# Patient Record
Sex: FEMALE | Race: WHITE | NOT HISPANIC OR LATINO | Employment: FULL TIME | URBAN - METROPOLITAN AREA
[De-identification: names, ages, dates, MRNs, and addresses within clinical notes are randomized per-mention and may not be internally consistent; named-entity substitution may affect disease eponyms.]

---

## 2017-10-09 ENCOUNTER — OFFICE VISIT (OUTPATIENT)
Dept: CARDIOLOGY | Facility: CLINIC | Age: 67
End: 2017-10-09

## 2017-10-09 ENCOUNTER — CONVERSION ENCOUNTER (OUTPATIENT)
Dept: MAMMOGRAPHY | Facility: HOSPITAL | Age: 67
End: 2017-10-09

## 2017-10-09 VITALS
HEIGHT: 66 IN | BODY MASS INDEX: 35.36 KG/M2 | HEART RATE: 78 BPM | DIASTOLIC BLOOD PRESSURE: 72 MMHG | WEIGHT: 220 LBS | SYSTOLIC BLOOD PRESSURE: 120 MMHG

## 2017-10-09 DIAGNOSIS — I47.1 SUPRAVENTRICULAR TACHYCARDIA (HCC): Primary | ICD-10-CM

## 2017-10-09 PROCEDURE — 99213 OFFICE O/P EST LOW 20 MIN: CPT | Performed by: INTERNAL MEDICINE

## 2017-10-09 PROCEDURE — 93000 ELECTROCARDIOGRAM COMPLETE: CPT | Performed by: INTERNAL MEDICINE

## 2017-10-09 RX ORDER — ASPIRIN 325 MG
325 TABLET, DELAYED RELEASE (ENTERIC COATED) ORAL DAILY
Status: ON HOLD | COMMUNITY
End: 2021-12-09 | Stop reason: DRUGHIGH

## 2017-10-09 NOTE — PROGRESS NOTES
Date of Office Visit: 10/09/17  Encounter Provider: Rosendo Enrique MD  Place of Service: [facility] CARDIOLOGY  Patient Name: Samina Marin  :1950      Chief Complaint   Patient presents with   • Medical Clearance   • DOT Physical     History of Present Illness  HPI Comments: Mrs Marin is a very pleasant 66-year-old female with history of supraventricular tachycardia in 2014 had ablation.  She now comes in for follow-up.  The patient denies chest pain, pressure and heaviness. No shortness of breath, othopnea or PND. No palpitations, near syncope or syncope. No stroke type symptoms like paralysis, paresthesia, abrupt vision loss and dysarthria. No bleeding like blood in the stool or dark stools.   He feels like she's doing very well.        Past Medical History:   Diagnosis Date   • Allergic rhinitis    • Chronic bronchitis    • Perianal abscess    • Sinus bradycardia          Past Surgical History:   Procedure Laterality Date   • CARDIAC ELECTROPHYSIOLOGY STUDY AND ABLATION     • HYSTERECTOMY           Current Outpatient Prescriptions on File Prior to Visit   Medication Sig Dispense Refill   • esomeprazole (NexIUM) 40 MG capsule Take  by mouth daily.     • FLUoxetine (PROzac) 20 MG capsule Take  by mouth daily.     • solifenacin (VESICARE) 10 MG tablet Take  by mouth.     • [DISCONTINUED] diltiazem CD (CARDIZEM CD) 180 MG 24 hr capsule Take  by mouth.     • [DISCONTINUED] DULoxetine (CYMBALTA) 30 MG capsule Take  by mouth.       No current facility-administered medications on file prior to visit.          Social History     Social History   • Marital status:      Spouse name: N/A   • Number of children: N/A   • Years of education: N/A     Occupational History   • Not on file.     Social History Main Topics   • Smoking status: Never Smoker   • Smokeless tobacco: Current User   • Alcohol use Yes      Comment: recent decrease   • Drug use: Not on file   • Sexual activity:  "Not on file     Other Topics Concern   • Not on file     Social History Narrative       History reviewed. No pertinent family history.      Review of Systems   Constitution: Negative for decreased appetite, diaphoresis, fever, weakness, malaise/fatigue, weight gain and weight loss.   HENT: Negative for congestion, hearing loss, nosebleeds and tinnitus.    Eyes: Negative for blurred vision, double vision, vision loss in left eye, vision loss in right eye and visual disturbance.   Cardiovascular:        As noted in HPI   Respiratory:        As noted HPI   Endocrine: Negative for cold intolerance and heat intolerance.   Hematologic/Lymphatic: Negative for bleeding problem. Does not bruise/bleed easily.   Skin: Negative for color change, flushing, itching and rash.   Musculoskeletal: Negative for arthritis, back pain, joint pain, joint swelling, muscle weakness and myalgias.   Gastrointestinal: Negative for bloating, abdominal pain, constipation, diarrhea, dysphagia, heartburn, hematemesis, hematochezia, melena, nausea and vomiting.   Genitourinary: Negative for bladder incontinence, dysuria, frequency, nocturia and urgency.   Neurological: Negative for dizziness, focal weakness, headaches, light-headedness, loss of balance, numbness, paresthesias and vertigo.   Psychiatric/Behavioral: Negative for depression, memory loss and substance abuse.       Procedures      ECG 12 Lead  Date/Time: 10/9/2017 11:46 AM  Performed by: FRANCO MUNGUIA  Authorized by: FRANCO MUNGUIA   Comparison: compared with previous ECG   Similar to previous ECG  Rhythm: sinus rhythm  Rate: normal  Conduction: LAFB  QRS axis: left                 Objective:    /72 (BP Location: Right arm)  Pulse 78  Ht 66\" (167.6 cm)  Wt 220 lb (99.8 kg)  BMI 35.51 kg/m2       Physical Exam  Physical Exam   Constitutional: She is oriented to person, place, and time. She appears well-developed and well-nourished. No distress.   HENT:   Head: " Normocephalic.   Eyes: Conjunctivae are normal. Pupils are equal, round, and reactive to light. No scleral icterus.   Neck: Normal carotid pulses, no hepatojugular reflux and no JVD present. Carotid bruit is not present. No tracheal deviation, no edema and no erythema present. No thyromegaly present.   Cardiovascular: Normal rate, regular rhythm, S1 normal, S2 normal, normal heart sounds and intact distal pulses.   No extrasystoles are present. PMI is not displaced.  Exam reveals no gallop, no distant heart sounds and no friction rub.    No murmur heard.  Pulses:       Carotid pulses are 2+ on the right side, and 2+ on the left side.       Radial pulses are 2+ on the right side, and 2+ on the left side.        Femoral pulses are 2+ on the right side, and 2+ on the left side.       Dorsalis pedis pulses are 2+ on the right side, and 2+ on the left side.        Posterior tibial pulses are 2+ on the right side, and 2+ on the left side.   Pulmonary/Chest: Effort normal and breath sounds normal. No respiratory distress. She has no decreased breath sounds. She has no wheezes. She has no rhonchi. She has no rales. She exhibits no tenderness.   Abdominal: Soft. Bowel sounds are normal. She exhibits no distension and no mass. There is no hepatosplenomegaly. There is no tenderness. There is no rebound and no guarding.   Musculoskeletal: She exhibits no edema, tenderness or deformity.   Neurological: She is alert and oriented to person, place, and time.   Skin: Skin is warm and dry. No rash noted. She is not diaphoretic. No cyanosis or erythema. No pallor. Nails show no clubbing.   Psychiatric: She has a normal mood and affect. Her speech is normal and behavior is normal. Judgment and thought content normal.           Assessment:   1.  66-year-old female with a history of supraventricular tachycardia status post ablation now stable no recurrence off medication she's to continue the same will see us skin in follow-up in a  year.  2.  Need for 's license.  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    There are number of past history items in Ms. Marin's chart that were inaccurate and obviously abstracted into the wrong chart I removed those.          Plan:

## 2018-03-05 ENCOUNTER — OFFICE VISIT CONVERTED (OUTPATIENT)
Dept: FAMILY MEDICINE CLINIC | Facility: CLINIC | Age: 68
End: 2018-03-05
Attending: NURSE PRACTITIONER

## 2018-03-05 ENCOUNTER — CONVERSION ENCOUNTER (OUTPATIENT)
Dept: FAMILY MEDICINE CLINIC | Facility: CLINIC | Age: 68
End: 2018-03-05

## 2018-10-15 ENCOUNTER — OFFICE VISIT (OUTPATIENT)
Dept: CARDIOLOGY | Facility: CLINIC | Age: 68
End: 2018-10-15

## 2018-10-15 VITALS
SYSTOLIC BLOOD PRESSURE: 130 MMHG | BODY MASS INDEX: 36.48 KG/M2 | DIASTOLIC BLOOD PRESSURE: 82 MMHG | HEART RATE: 84 BPM | HEIGHT: 66 IN | WEIGHT: 227 LBS

## 2018-10-15 DIAGNOSIS — I20.0 UNSTABLE ANGINA PECTORIS (HCC): ICD-10-CM

## 2018-10-15 DIAGNOSIS — I47.1 SUPRAVENTRICULAR TACHYCARDIA (HCC): Primary | ICD-10-CM

## 2018-10-15 PROCEDURE — 93000 ELECTROCARDIOGRAM COMPLETE: CPT | Performed by: INTERNAL MEDICINE

## 2018-10-15 PROCEDURE — 99214 OFFICE O/P EST MOD 30 MIN: CPT | Performed by: INTERNAL MEDICINE

## 2018-10-15 RX ORDER — DULOXETIN HYDROCHLORIDE 60 MG/1
60 CAPSULE, DELAYED RELEASE ORAL DAILY
COMMUNITY
End: 2021-08-26 | Stop reason: SDUPTHER

## 2018-10-15 NOTE — PROGRESS NOTES
Date of Office Visit: 10/15/18  Encounter Provider: Rosendo Enrique MD  Place of Service: [facility] CARDIOLOGY  Patient Name: Samina Marin  :1950  Referral Provider:No ref. provider found      Chief Complaint   Patient presents with   • Follow-up     History of Present Illness  Mrs Marin is a very pleasant 67-year-old female with history of supraventricular tachycardia in 2014 had ablation.  She now comes in for follow-up.  Unfortunately about 2 weeks ago she started noticing this epigastric discomfort that can occur at any time lasting anywhere from 15 minutes up to an hour one episode she had was associated with diaphoresis.  No associated nausea vomiting or shortness of breath but she does get shortness of breath with exertion which is chronic.  This discomfort is getting progressively worse over this two-week time period.  She it's occasional fluttering but no near-syncope or syncope.  There has been a fair amount of stressors recently her brother just had bypass grafting a month ago when her 2 sisters both have cancer that she's had to deal with that.  Things develop more stressful work even.          Past Medical History:   Diagnosis Date   • Allergic rhinitis    • Chronic bronchitis (CMS/HCC)    • Perianal abscess    • Sinus bradycardia          Past Surgical History:   Procedure Laterality Date   • CARDIAC ELECTROPHYSIOLOGY STUDY AND ABLATION     • HYSTERECTOMY           Current Outpatient Prescriptions on File Prior to Visit   Medication Sig Dispense Refill   • aspirin  MG tablet Take 325 mg by mouth Daily.     • esomeprazole (NexIUM) 40 MG capsule Take  by mouth daily.     • FLUoxetine (PROzac) 20 MG capsule Take  by mouth daily.     • Multiple Vitamin (MULTI VITAMIN PO) Take  by mouth Daily.     • solifenacin (VESICARE) 10 MG tablet Take  by mouth.       No current facility-administered medications on file prior to visit.          Social History     Social  History   • Marital status:      Spouse name: N/A   • Number of children: N/A   • Years of education: N/A     Occupational History   • Not on file.     Social History Main Topics   • Smoking status: Never Smoker   • Smokeless tobacco: Current User      Comment: CAFFEINE USE   • Alcohol use Yes      Comment: RARE   • Drug use: Unknown   • Sexual activity: Not on file     Other Topics Concern   • Not on file     Social History Narrative   • No narrative on file       Family History   Problem Relation Age of Onset   • Heart disease Sister    • Ovarian cancer Sister    • Liver cancer Sister    • Heart disease Brother    • Hypertension Sister    • Pancreatic cancer Sister          Review of Systems   Constitution: Negative for decreased appetite, diaphoresis, fever, weakness, malaise/fatigue, weight gain and weight loss.   HENT: Negative for congestion, hearing loss, nosebleeds and tinnitus.    Eyes: Negative for blurred vision, double vision, vision loss in left eye, vision loss in right eye and visual disturbance.   Cardiovascular:        As noted in HPI   Respiratory:        As noted HPI   Endocrine: Negative for cold intolerance and heat intolerance.   Hematologic/Lymphatic: Negative for bleeding problem. Does not bruise/bleed easily.   Skin: Negative for color change, flushing, itching and rash.   Musculoskeletal: Negative for arthritis, back pain, joint pain, joint swelling, muscle weakness and myalgias.   Gastrointestinal: Negative for bloating, abdominal pain, constipation, diarrhea, dysphagia, heartburn, hematemesis, hematochezia, melena, nausea and vomiting.   Genitourinary: Negative for bladder incontinence, dysuria, frequency, nocturia and urgency.   Neurological: Negative for dizziness, focal weakness, headaches, light-headedness, loss of balance, numbness, paresthesias and vertigo.   Psychiatric/Behavioral: Negative for depression, memory loss and substance abuse.       Procedures      ECG 12  "Lead  Date/Time: 10/15/2018 2:17 PM  Performed by: FRANCO MUNGUIA  Authorized by: FRANCO MUNGUIA   Comparison: compared with previous ECG   Similar to previous ECG  Rhythm: sinus rhythm  Rate: normal  QRS axis: normal  Other findings: PRWP                Objective:    /82   Pulse 84   Ht 167.6 cm (66\")   Wt 103 kg (227 lb)   BMI 36.64 kg/m²        Physical Exam  Physical Exam   Constitutional: She is oriented to person, place, and time. She appears well-developed and well-nourished. No distress.   HENT:   Head: Normocephalic.   Eyes: Pupils are equal, round, and reactive to light. Conjunctivae are normal. No scleral icterus.   Neck: Normal carotid pulses, no hepatojugular reflux and no JVD present. Carotid bruit is not present. No tracheal deviation, no edema and no erythema present. No thyromegaly present.   Cardiovascular: Normal rate, regular rhythm, S1 normal, S2 normal, normal heart sounds and intact distal pulses.   No extrasystoles are present. PMI is not displaced.  Exam reveals no gallop, no distant heart sounds and no friction rub.    No murmur heard.  Pulses:       Carotid pulses are 2+ on the right side, and 2+ on the left side.       Radial pulses are 2+ on the right side, and 2+ on the left side.        Femoral pulses are 2+ on the right side, and 2+ on the left side.       Dorsalis pedis pulses are 2+ on the right side, and 2+ on the left side.        Posterior tibial pulses are 2+ on the right side, and 2+ on the left side.   Pulmonary/Chest: Effort normal and breath sounds normal. No respiratory distress. She has no decreased breath sounds. She has no wheezes. She has no rhonchi. She has no rales. She exhibits no tenderness.   Abdominal: Soft. Bowel sounds are normal. She exhibits no distension and no mass. There is no hepatosplenomegaly. There is no tenderness. There is no rebound and no guarding.   Musculoskeletal: She exhibits no edema, tenderness or deformity.   Neurological: She " is alert and oriented to person, place, and time.   Skin: Skin is warm and dry. No rash noted. She is not diaphoretic. No cyanosis or erythema. No pallor. Nails show no clubbing.   Psychiatric: She has a normal mood and affect. Her speech is normal and behavior is normal. Judgment and thought content normal.           Assessment:   1.  67-year-old female with a history of supraventricular tachycardia status post ablation now stable no recurrence off medication she's to continue the same will see us again in follow-up in a year.  2.  Chest pain.  Worrisome for angina.  Her coronary disease risk consortium score is intermediate at 6%.  Therefore warrants perfusion stress testing will do this as a one-day non-walking protocol.  She is agreeable to this approach.  3.  Questionable hyperlipidemia does need lipid profile especially in face of her brothers heart history now.  When she's had that done by her PCP would calculate [employer] College of cardiology risk of stroke or heart attack in 10 years and treat her accordingly.  4.  Probable depression may need further work on this.         Plan:

## 2018-10-22 ENCOUNTER — OFFICE VISIT CONVERTED (OUTPATIENT)
Dept: FAMILY MEDICINE CLINIC | Facility: CLINIC | Age: 68
End: 2018-10-22
Attending: NURSE PRACTITIONER

## 2018-10-23 ENCOUNTER — TELEPHONE (OUTPATIENT)
Dept: CARDIOLOGY | Facility: CLINIC | Age: 68
End: 2018-10-23

## 2018-10-29 ENCOUNTER — TELEPHONE (OUTPATIENT)
Dept: CARDIOLOGY | Facility: CLINIC | Age: 68
End: 2018-10-29

## 2018-10-29 ENCOUNTER — HOSPITAL ENCOUNTER (OUTPATIENT)
Dept: CARDIOLOGY | Facility: HOSPITAL | Age: 68
Discharge: HOME | End: 2018-10-29
Attending: INTERNAL MEDICINE | Admitting: INTERNAL MEDICINE

## 2018-10-29 VITALS — WEIGHT: 227 LBS | BODY MASS INDEX: 36.48 KG/M2 | HEIGHT: 66 IN

## 2018-10-29 DIAGNOSIS — I25.2 OLD MI (MYOCARDIAL INFARCTION): Primary | ICD-10-CM

## 2018-10-29 LAB
BH CV NUCLEAR PRIOR STUDY: 2
BH CV STRESS BP STAGE 1: NORMAL
BH CV STRESS COMMENTS STAGE 1: NORMAL
BH CV STRESS DOSE REGADENOSON STAGE 1: 0.4
BH CV STRESS DURATION MIN STAGE 1: 0
BH CV STRESS DURATION SEC STAGE 1: 10
BH CV STRESS HR STAGE 1: 123
BH CV STRESS PROTOCOL 1: NORMAL
BH CV STRESS RECOVERY BP: NORMAL MMHG
BH CV STRESS RECOVERY HR: 98 BPM
BH CV STRESS STAGE 1: 1
LV EF NUC BP: 53 %
MAXIMAL PREDICTED HEART RATE: 153 BPM
PERCENT MAX PREDICTED HR: 80.39 %
STRESS BASELINE BP: NORMAL MMHG
STRESS BASELINE HR: 86 BPM
STRESS PERCENT HR: 95 %
STRESS POST EXERCISE DUR SEC: 10 SEC
STRESS POST PEAK BP: NORMAL MMHG
STRESS POST PEAK HR: 123 BPM
STRESS TARGET HR: 130 BPM

## 2018-10-29 PROCEDURE — 93017 CV STRESS TEST TRACING ONLY: CPT

## 2018-10-29 PROCEDURE — 93016 CV STRESS TEST SUPVJ ONLY: CPT | Performed by: INTERNAL MEDICINE

## 2018-10-29 PROCEDURE — 78452 HT MUSCLE IMAGE SPECT MULT: CPT | Performed by: INTERNAL MEDICINE

## 2018-10-29 PROCEDURE — 78452 HT MUSCLE IMAGE SPECT MULT: CPT

## 2018-10-29 PROCEDURE — A9502 TC99M TETROFOSMIN: HCPCS | Performed by: INTERNAL MEDICINE

## 2018-10-29 PROCEDURE — 0 TECHNETIUM TETROFOSMIN KIT: Performed by: INTERNAL MEDICINE

## 2018-10-29 PROCEDURE — 93018 CV STRESS TEST I&R ONLY: CPT | Performed by: INTERNAL MEDICINE

## 2018-10-29 PROCEDURE — 25010000002 REGADENOSON 0.4 MG/5ML SOLUTION: Performed by: INTERNAL MEDICINE

## 2018-10-29 RX ORDER — AMLODIPINE BESYLATE 2.5 MG/1
2.5 TABLET ORAL DAILY
Qty: 30 TABLET | Refills: 11 | Status: SHIPPED | OUTPATIENT
Start: 2018-10-29 | End: 2018-10-30

## 2018-10-29 RX ADMIN — TETROFOSMIN 1 DOSE: 1.38 INJECTION, POWDER, LYOPHILIZED, FOR SOLUTION INTRAVENOUS at 09:45

## 2018-10-29 RX ADMIN — REGADENOSON 0.4 MG: 0.08 INJECTION, SOLUTION INTRAVENOUS at 09:45

## 2018-10-29 RX ADMIN — TETROFOSMIN 1 DOSE: 1.38 INJECTION, POWDER, LYOPHILIZED, FOR SOLUTION INTRAVENOUS at 08:40

## 2018-10-29 NOTE — TELEPHONE ENCOUNTER
Called l/m to call back. Needs an echo and amlodipine. Will need to see Mariah in 4 weeks, me 6 months.BORIS

## 2018-10-29 NOTE — TELEPHONE ENCOUNTER
Samina Marin  Female, 67 y.o., 1950  PCP:   Jonelle Joseph APRN  Language:   English  Need Interp:   No  Last Weight:   103 kg (227 lb)  Phone:   H: [phone] W: [phone]  Allergies  Meperidine  Health Maintenance:   Due  FYI:   None  Primary Ins.:   [employer] MEDICARE REPLACEMENT  MRN:   [id number]  MyChart:   Pending  Pharmacy:   [facility] [address] [place] [address] [address] AT [facility] [address] - [phone] PH - [phone] FX [[id number]] [facility] - [address] - [phone] [facility] [phone] FX [[id number]]  Preferred Lab:   None  Next Appt with Me:   None  Next Appt Date by Dept:   None        PACS Images     Radiology Images   Stress Test With Myocardial Perfusion One Day   Order: [id number]   Status:  Final result   Visible to patient:  No (Not Released) Dx:  Unstable angina pectoris (CMS/HCC)   Details     Reading Physician Reading Date Result Priority   Fabian Gonzales MD Haraden, Brennan M, MD Haraden, Brennan M, MD 10/29/2018  10/29/2018  10/29/2018 Routine   Result Text   ·   Myocardial perfusion imaging indicates a small-to-medium-sized infarct located in the inferior wall and apex with no significant ischemia noted.  · Left ventricular ejection fraction is normal (Calculated EF = 53%).            All Measurements                                                                                                                          [facility] NUC [facility]ARD  [address] [address] [phone]      Stress Data     Stage Heart Rate (BPM) Blood Pressure (mmHg) Minutes Seconds Dose (mg) Comments   1        123        150/84        0        10        0.4        10 sec bolus injection          Stress Measurements     Baseline Vitals   Baseline HR 86 bpm      Baseline /98 mmHg       Peak Stress Vitals   Peak  bpm      Peak /84 mmHg       Recovery Vitals    Recovery HR 98 bpm      Recovery /86 mmHg       Exercise Data   Target HR (85%) 130 bpm      Max. Pred. HR (100%) 153 bpm      Percent Max Pred HR 80.39 %      Exercise duration (sec) 10 sec         Stress Procedure     Rest ECG Baseline ECG of normal sinus rhythm noted. Non-specific ST-T wave changes noted.   VT interval = 160 ms. QRS complex = 80 ms. There was no ST segment deviation noted.      Stress ECG Stress ECG of normal sinus rhythm noted. Normal ECG with no significant stress induced changes noted.   There was no ST segment deviation noted during stress.   Arrhythmias during stress: rare PVCs.   There were no arrhythmias during recovery.   Arrhythmias were not significant.   ECG was interpretable.   Indeterminate stress ECG interpretation.      Stress Description A pharmacological stress test was performed using regadenoson with low-level exercise.   The patient reached the end of the protocol.   The patient reported dyspnea during the stress test. Symptoms were not clinically significant.      Stress Findings Equivocal ECG evidence of myocardial ischemia.Indeterminate clinical evidence of myocardial ischemia.      Nuclear Perfusion Findings     Study Impression Myocardial perfusion imaging indicates a small-to-medium-sized infarct located in the inferior wall and apex with no significant ischemia noted.      Rest Perfusion Defect 1 There is a moderately sized defect with moderate reduction in uptake present in the mid inferior and apical wall.      Stress Perfusion Defect 1 There is a moderately sized defect of mild severity present in the mid inferior and apical inferior wall.      Ventricle Size / Description Left ventricular ejection fraction is normal (Calculated EF = 53%). Normal LV cavity size. Abnormal LV wall motion consistent with mild hypokinesis of the inferior wall. RV cavity size not well visualized.      PACS Images     Show images for Stress Test With Myocardial Perfusion One Day           Last Resulted: 10/29/18 12:06                Status of Other Orders     Order  Lab Status Result Date Provider Status   ECG 12 Lead Final result 10/15/2018 Open   SCANNED EKG Final result 10/15/2018 Open          Encounter Notes      All notes         Routing History     Priority Sent On From To Message Type    10/29/2018 12:10 PM Fabian Gonzales MD Imburgia, Michael, MD Results

## 2018-10-30 ENCOUNTER — TELEPHONE (OUTPATIENT)
Dept: CARDIOLOGY | Facility: CLINIC | Age: 68
End: 2018-10-30

## 2018-11-05 ENCOUNTER — OFFICE VISIT CONVERTED (OUTPATIENT)
Dept: FAMILY MEDICINE CLINIC | Facility: CLINIC | Age: 68
End: 2018-11-05
Attending: NURSE PRACTITIONER

## 2018-11-06 ENCOUNTER — TELEPHONE (OUTPATIENT)
Dept: CARDIOLOGY | Facility: CLINIC | Age: 68
End: 2018-11-06

## 2018-11-06 ENCOUNTER — HOSPITAL ENCOUNTER (OUTPATIENT)
Dept: CARDIOLOGY | Facility: HOSPITAL | Age: 68
Discharge: HOME | End: 2018-11-06
Attending: INTERNAL MEDICINE | Admitting: INTERNAL MEDICINE

## 2018-11-06 VITALS
WEIGHT: 227 LBS | DIASTOLIC BLOOD PRESSURE: 98 MMHG | SYSTOLIC BLOOD PRESSURE: 150 MMHG | HEIGHT: 66 IN | HEART RATE: 80 BPM | BODY MASS INDEX: 36.48 KG/M2

## 2018-11-06 PROCEDURE — 93306 TTE W/DOPPLER COMPLETE: CPT

## 2018-11-06 PROCEDURE — 93306 TTE W/DOPPLER COMPLETE: CPT | Performed by: INTERNAL MEDICINE

## 2018-11-06 PROCEDURE — 25010000002 PERFLUTREN (DEFINITY) 8.476 MG IN SODIUM CHLORIDE 0.9 % 10 ML INJECTION: Performed by: INTERNAL MEDICINE

## 2018-11-06 RX ADMIN — PERFLUTREN 1.5 ML: 6.52 INJECTION, SUSPENSION INTRAVENOUS at 15:35

## 2018-11-06 NOTE — TELEPHONE ENCOUNTER
Tomas Samina J  Female, 67 y.o., 1950  PCP:   Jonelle Joseph APRN  Language:   English  Need Interp:   No  Last Weight:   103 kg (227 lb)  Phone:   H: [phone] W: [phone]  Allergies  Meperidine  Health Maintenance:   Due  FYI:   None  Primary Ins.:   HUMANA MEDICARE REPLACEMENT  MRN:   [id number]  MyChart:   Pending  Pharmacy:   [facility] [address] [place] [address] [address] AT [facility] [address] - [phone] PH - [phone] FX [[id number]] [facility] - [address] - [phone] [facility] [phone] FX [[id number]]  Preferred Lab:   None  Next Appt with Me:   None  Next Appt Date by Dept:   None        PACS Images     Radiology Images   Adult Transthoracic Echo Complete W/ Cont if Necessary Per Protocol   Order: [id number]   Status:  Final result   Visible to patient:  No (Not Released) Dx:  Old MI (myocardial infarction)   Details     Reading Physician Reading Date Result Priority   Rosendo Enrique MD 11/6/2018 Routine   Result Text   ·   Left ventricular systolic function is normal. Calculated EF = 60%. Estimated EF was in agreement with the calculated EF. Normal left ventricular cavity size noted. All left ventricular wall segments contract normally.  · Left ventricular wall thickness is consistent with borderline concentric hypertrophy.  · Left ventricular diastolic dysfunction is noted (grade II w/high LAP) consistent with pseudonormalization.  · Mild aortic valve regurgitation is present.            All Measurements                                                                                                                                                                                                                                                                                                                                                                                                                                                                                                                                                                                                                                                         [facility] OUTPATIENT ECHOCARDIOGRAPHY  [address] [facility]  [address]  [facility] [address] [phone]      Study Description     A two-dimensional transthoracic echocardiogram with color flow and Doppler was performed. The study is technically good for diagnosis.Verbal consent was obtained from the patient to use Definity contrast in order to optimize the study. The use of Definity was indicated as two or more contiguous segments of the left ventricular endocardial border were unable to be adequately visualized by standard imaging methods. 1.3 mL of mechanically activated Definity was mixed with 8.7 mL of normal saline. A total of 1.5 mL of the resulting Definity solution was administered, and the remaining contrast was wasted and discarded.   No adverse reaction to contrast was noted.   O2-96%.   Echocardiogram Findings     Left Ventricle Left ventricular systolic function is normal. Calculated EF = 60%. Estimated EF was in agreement with the calculated EF. Normal left ventricular cavity size noted. All left ventricular wall segments contract normally. Left ventricular wall thickness is consistent with borderline concentric hypertrophy. Left ventricular diastolic dysfunction is noted (grade II w/high LAP) consistent with pseudonormalization.      Right Ventricle Normal right ventricular cavity size, wall thickness, systolic function and septal motion noted.      Left Atrium Normal left atrial size and volume noted.      Right Atrium Normal right atrial size noted.      Aortic Valve The aortic valve is structurally normal. Mild aortic valve regurgitation is present. No aortic valve stenosis is present.      Mitral Valve The mitral valve is normal in structure. No mitral valve regurgitation is  present. No significant mitral valve stenosis is present.      Tricuspid Valve The tricuspid valve is normal. No tricuspid valve stenosis is present. No tricuspid valve regurgitation is present. Calculated right ventricular systolic pressure from tricuspid regurgitation is 15.4 mmHg.      Pulmonic Valve The pulmonic valve is structurally normal. There is no significant pulmonic valve stenosis present. There is mild pulmonic valve regurgitation present.      Greater Vessels No dilation of the aortic root is present. No dilation of the sinuses of Valsalva is present. No dilation of the proximal aorta is present. No dilation of the ascending aorta is present. No dilation of the aortic arch is present. No dilation of the descending aorta present. The inferior vena cava is normally sized. Normal IVC inspiratory collapse of greater than 50% noted.      Pericardium The pericardium is normal. There is no evidence of pericardial effusion.      Wall Scoring     Score Index: 1.000 Percent Normal: 100.0%             The left ventricular wall motion is normal.               PACS Images     Show images for Adult Transthoracic Echo Complete W/ Cont if Necessary Per Protocol         Specimen Collected: 11/06/18 15:00 Last Resulted: 11/06/18 15:51                Encounter Notes      All notes         Routing History     Priority Sent On From To Message Type    11/6/2018  3:55 PM Rosendo Enrique MD Imburgia, Michael, MD Results

## 2018-11-07 LAB
ASCENDING AORTA: 3.3 CM
BH CV ECHO MEAS - ACS: 2.1 CM
BH CV ECHO MEAS - AI DEC SLOPE: 249.9 CM/SEC^2
BH CV ECHO MEAS - AI MAX PG: 73.2 MMHG
BH CV ECHO MEAS - AI MAX VEL: 427.7 CM/SEC
BH CV ECHO MEAS - AI P1/2T: 501.2 MSEC
BH CV ECHO MEAS - AO MAX PG (FULL): 2.4 MMHG
BH CV ECHO MEAS - AO MAX PG: 4.3 MMHG
BH CV ECHO MEAS - AO MEAN PG (FULL): 1.4 MMHG
BH CV ECHO MEAS - AO MEAN PG: 2.6 MMHG
BH CV ECHO MEAS - AO ROOT AREA (BSA CORRECTED): 1.7
BH CV ECHO MEAS - AO ROOT AREA: 10.1 CM^2
BH CV ECHO MEAS - AO ROOT DIAM: 3.6 CM
BH CV ECHO MEAS - AO V2 MAX: 103.2 CM/SEC
BH CV ECHO MEAS - AO V2 MEAN: 78.6 CM/SEC
BH CV ECHO MEAS - AO V2 VTI: 22.4 CM
BH CV ECHO MEAS - AVA(I,A): 2.3 CM^2
BH CV ECHO MEAS - AVA(I,D): 2.3 CM^2
BH CV ECHO MEAS - AVA(V,A): 2.2 CM^2
BH CV ECHO MEAS - AVA(V,D): 2.2 CM^2
BH CV ECHO MEAS - BSA(HAYCOCK): 2.2 M^2
BH CV ECHO MEAS - BSA: 2.1 M^2
BH CV ECHO MEAS - BZI_BMI: 36.6 KILOGRAMS/M^2
BH CV ECHO MEAS - BZI_METRIC_HEIGHT: 167.6 CM
BH CV ECHO MEAS - BZI_METRIC_WEIGHT: 103 KG
BH CV ECHO MEAS - EDV(MOD-SP2): 103 ML
BH CV ECHO MEAS - EDV(MOD-SP4): 88 ML
BH CV ECHO MEAS - EDV(TEICH): 107.6 ML
BH CV ECHO MEAS - EF(CUBED): 72.2 %
BH CV ECHO MEAS - EF(MOD-BP): 60 %
BH CV ECHO MEAS - EF(MOD-SP2): 56.3 %
BH CV ECHO MEAS - EF(MOD-SP4): 61.4 %
BH CV ECHO MEAS - EF(TEICH): 63.8 %
BH CV ECHO MEAS - ESV(MOD-SP2): 45 ML
BH CV ECHO MEAS - ESV(MOD-SP4): 34 ML
BH CV ECHO MEAS - ESV(TEICH): 38.9 ML
BH CV ECHO MEAS - FS: 34.7 %
BH CV ECHO MEAS - IVS/LVPW: 1.1
BH CV ECHO MEAS - IVSD: 1.2 CM
BH CV ECHO MEAS - LAT PEAK E' VEL: 7 CM/SEC
BH CV ECHO MEAS - LV DIASTOLIC VOL/BSA (35-75): 41.7 ML/M^2
BH CV ECHO MEAS - LV MASS(C)D: 214 GRAMS
BH CV ECHO MEAS - LV MASS(C)DI: 101.4 GRAMS/M^2
BH CV ECHO MEAS - LV MAX PG: 1.8 MMHG
BH CV ECHO MEAS - LV MEAN PG: 1.2 MMHG
BH CV ECHO MEAS - LV SYSTOLIC VOL/BSA (12-30): 16.1 ML/M^2
BH CV ECHO MEAS - LV V1 MAX: 67.4 CM/SEC
BH CV ECHO MEAS - LV V1 MEAN: 53.7 CM/SEC
BH CV ECHO MEAS - LV V1 VTI: 15.1 CM
BH CV ECHO MEAS - LVIDD: 4.8 CM
BH CV ECHO MEAS - LVIDS: 3.1 CM
BH CV ECHO MEAS - LVLD AP2: 7.5 CM
BH CV ECHO MEAS - LVLD AP4: 7.1 CM
BH CV ECHO MEAS - LVLS AP2: 6.1 CM
BH CV ECHO MEAS - LVLS AP4: 6.2 CM
BH CV ECHO MEAS - LVOT AREA (M): 3.5 CM^2
BH CV ECHO MEAS - LVOT AREA: 3.3 CM^2
BH CV ECHO MEAS - LVOT DIAM: 2.1 CM
BH CV ECHO MEAS - LVPWD: 1.1 CM
BH CV ECHO MEAS - MED PEAK E' VEL: 5 CM/SEC
BH CV ECHO MEAS - MR MAX PG: 21.8 MMHG
BH CV ECHO MEAS - MR MAX VEL: 233.5 CM/SEC
BH CV ECHO MEAS - MV A DUR: 0.1 SEC
BH CV ECHO MEAS - MV A MAX VEL: 58.7 CM/SEC
BH CV ECHO MEAS - MV DEC SLOPE: 490.6 CM/SEC^2
BH CV ECHO MEAS - MV DEC TIME: 0.14 SEC
BH CV ECHO MEAS - MV E MAX VEL: 69.8 CM/SEC
BH CV ECHO MEAS - MV E/A: 1.2
BH CV ECHO MEAS - MV MAX PG: 2.1 MMHG
BH CV ECHO MEAS - MV MEAN PG: 0.91 MMHG
BH CV ECHO MEAS - MV P1/2T MAX VEL: 70.3 CM/SEC
BH CV ECHO MEAS - MV P1/2T: 42 MSEC
BH CV ECHO MEAS - MV V2 MAX: 71.9 CM/SEC
BH CV ECHO MEAS - MV V2 MEAN: 45.9 CM/SEC
BH CV ECHO MEAS - MV V2 VTI: 19.7 CM
BH CV ECHO MEAS - MVA P1/2T LCG: 3.1 CM^2
BH CV ECHO MEAS - MVA(P1/2T): 5.2 CM^2
BH CV ECHO MEAS - MVA(VTI): 2.6 CM^2
BH CV ECHO MEAS - PA ACC TIME: 0.08 SEC
BH CV ECHO MEAS - PA MAX PG (FULL): 1.3 MMHG
BH CV ECHO MEAS - PA MAX PG: 2.5 MMHG
BH CV ECHO MEAS - PA PR(ACCEL): 41 MMHG
BH CV ECHO MEAS - PA V2 MAX: 78.7 CM/SEC
BH CV ECHO MEAS - PULM A REVS DUR: 0.12 SEC
BH CV ECHO MEAS - PULM A REVS VEL: 17.9 CM/SEC
BH CV ECHO MEAS - PULM DIAS VEL: 57.2 CM/SEC
BH CV ECHO MEAS - PULM S/D: 0.73
BH CV ECHO MEAS - PULM SYS VEL: 41.7 CM/SEC
BH CV ECHO MEAS - PVA(V,A): 2.1 CM^2
BH CV ECHO MEAS - PVA(V,D): 2.1 CM^2
BH CV ECHO MEAS - QP/QS: 0.72
BH CV ECHO MEAS - RAP SYSTOLE: 3 MMHG
BH CV ECHO MEAS - RV MAX PG: 1.2 MMHG
BH CV ECHO MEAS - RV MEAN PG: 0.67 MMHG
BH CV ECHO MEAS - RV V1 MAX: 55.3 CM/SEC
BH CV ECHO MEAS - RV V1 MEAN: 39.1 CM/SEC
BH CV ECHO MEAS - RV V1 VTI: 12.4 CM
BH CV ECHO MEAS - RVOT AREA: 2.9 CM^2
BH CV ECHO MEAS - RVOT DIAM: 1.9 CM
BH CV ECHO MEAS - RVSP: 15.4 MMHG
BH CV ECHO MEAS - SI(AO): 106.9 ML/M^2
BH CV ECHO MEAS - SI(CUBED): 37.9 ML/M^2
BH CV ECHO MEAS - SI(LVOT): 23.9 ML/M^2
BH CV ECHO MEAS - SI(MOD-SP2): 27.5 ML/M^2
BH CV ECHO MEAS - SI(MOD-SP4): 25.6 ML/M^2
BH CV ECHO MEAS - SI(TEICH): 32.5 ML/M^2
BH CV ECHO MEAS - SUP REN AO DIAM: 1.9 CM
BH CV ECHO MEAS - SV(AO): 225.7 ML
BH CV ECHO MEAS - SV(CUBED): 79.9 ML
BH CV ECHO MEAS - SV(LVOT): 50.5 ML
BH CV ECHO MEAS - SV(MOD-SP2): 58 ML
BH CV ECHO MEAS - SV(MOD-SP4): 54 ML
BH CV ECHO MEAS - SV(RVOT): 36.2 ML
BH CV ECHO MEAS - SV(TEICH): 68.7 ML
BH CV ECHO MEAS - TAPSE (>1.6): 2.7 CM2
BH CV ECHO MEAS - TR MAX VEL: 175.8 CM/SEC
BH CV ECHO MEASUREMENTS AVERAGE E/E' RATIO: 11.63
BH CV XLRA - RV BASE: 3 CM
BH CV XLRA - TDI S': 13 CM/SEC
LEFT ATRIUM VOLUME INDEX: 21 ML/M2
MAXIMAL PREDICTED HEART RATE: 153 BPM
SINUS: 3.1 CM
STJ: 2.3 CM
STRESS TARGET HR: 130 BPM

## 2019-04-22 ENCOUNTER — OFFICE VISIT CONVERTED (OUTPATIENT)
Dept: FAMILY MEDICINE CLINIC | Facility: CLINIC | Age: 69
End: 2019-04-22
Attending: NURSE PRACTITIONER

## 2019-07-11 ENCOUNTER — HOSPITAL ENCOUNTER (OUTPATIENT)
Dept: GENERAL RADIOLOGY | Facility: HOSPITAL | Age: 69
Discharge: HOME | End: 2019-07-11
Attending: NURSE PRACTITIONER

## 2020-03-10 ENCOUNTER — CONVERSION ENCOUNTER (OUTPATIENT)
Dept: FAMILY MEDICINE CLINIC | Facility: CLINIC | Age: 70
End: 2020-03-10

## 2020-03-10 ENCOUNTER — HOSPITAL ENCOUNTER (OUTPATIENT)
Dept: LAB | Facility: HOSPITAL | Age: 70
Discharge: HOME | End: 2020-03-10
Attending: NURSE PRACTITIONER

## 2020-03-10 ENCOUNTER — HOSPITAL ENCOUNTER (OUTPATIENT)
Dept: GENERAL RADIOLOGY | Facility: HOSPITAL | Age: 70
Discharge: HOME | End: 2020-03-10
Attending: NURSE PRACTITIONER

## 2020-03-10 ENCOUNTER — OFFICE VISIT CONVERTED (OUTPATIENT)
Dept: FAMILY MEDICINE CLINIC | Facility: CLINIC | Age: 70
End: 2020-03-10
Attending: NURSE PRACTITIONER

## 2020-03-10 LAB
ALBUMIN SERPL-MCNC: 3.9 G/DL (ref 3.5–5)
ALBUMIN/GLOB SERPL: 1.4 {RATIO} (ref 1.4–2.6)
ALP SERPL-CCNC: 97 U/L (ref 43–160)
ALT SERPL-CCNC: 10 U/L (ref 10–40)
ANION GAP SERPL CALC-SCNC: 17 MMOL/L (ref 8–19)
AST SERPL-CCNC: 18 U/L (ref 15–50)
BASOPHILS # BLD AUTO: 0.03 10*3/UL (ref 0–0.2)
BASOPHILS NFR BLD AUTO: 0.5 % (ref 0–3)
BILIRUB SERPL-MCNC: 0.59 MG/DL (ref 0.2–1.3)
BUN SERPL-MCNC: 11 MG/DL (ref 5–25)
BUN/CREAT SERPL: 15 {RATIO} (ref 6–20)
CALCIUM SERPL-MCNC: 9.1 MG/DL (ref 8.7–10.4)
CHLORIDE SERPL-SCNC: 105 MMOL/L (ref 99–111)
CHOLEST SERPL-MCNC: 195 MG/DL (ref 107–200)
CHOLEST/HDLC SERPL: 2.7 {RATIO} (ref 3–6)
CONV ABS IMM GRAN: 0.01 10*3/UL (ref 0–0.2)
CONV CO2: 24 MMOL/L (ref 22–32)
CONV IMMATURE GRAN: 0.2 % (ref 0–1.8)
CONV TOTAL PROTEIN: 6.6 G/DL (ref 6.3–8.2)
CREAT UR-MCNC: 0.72 MG/DL (ref 0.5–0.9)
DEPRECATED RDW RBC AUTO: 41.9 FL (ref 36.4–46.3)
EOSINOPHIL # BLD AUTO: 0.2 10*3/UL (ref 0–0.7)
EOSINOPHIL # BLD AUTO: 3.5 % (ref 0–7)
ERYTHROCYTE [DISTWIDTH] IN BLOOD BY AUTOMATED COUNT: 13.2 % (ref 11.7–14.4)
GFR SERPLBLD BASED ON 1.73 SQ M-ARVRAT: >60 ML/MIN/{1.73_M2}
GLOBULIN UR ELPH-MCNC: 2.7 G/DL (ref 2–3.5)
GLUCOSE SERPL-MCNC: 101 MG/DL (ref 65–99)
HCT VFR BLD AUTO: 41.4 % (ref 37–47)
HDLC SERPL-MCNC: 71 MG/DL (ref 40–60)
HGB BLD-MCNC: 13.2 G/DL (ref 12–16)
LDLC SERPL CALC-MCNC: 112 MG/DL (ref 70–100)
LYMPHOCYTES # BLD AUTO: 1.91 10*3/UL (ref 1–5)
LYMPHOCYTES NFR BLD AUTO: 33.2 % (ref 20–45)
MCH RBC QN AUTO: 27.8 PG (ref 27–31)
MCHC RBC AUTO-ENTMCNC: 31.9 G/DL (ref 33–37)
MCV RBC AUTO: 87.3 FL (ref 81–99)
MONOCYTES # BLD AUTO: 0.42 10*3/UL (ref 0.2–1.2)
MONOCYTES NFR BLD AUTO: 7.3 % (ref 3–10)
NEUTROPHILS # BLD AUTO: 3.18 10*3/UL (ref 2–8)
NEUTROPHILS NFR BLD AUTO: 55.3 % (ref 30–85)
NRBC CBCN: 0 % (ref 0–0.7)
OSMOLALITY SERPL CALC.SUM OF ELEC: 294 MOSM/KG (ref 273–304)
PLATELET # BLD AUTO: 177 10*3/UL (ref 130–400)
PMV BLD AUTO: 9.4 FL (ref 9.4–12.3)
POTASSIUM SERPL-SCNC: 4.2 MMOL/L (ref 3.5–5.3)
RBC # BLD AUTO: 4.74 10*6/UL (ref 4.2–5.4)
SODIUM SERPL-SCNC: 142 MMOL/L (ref 135–147)
TRIGL SERPL-MCNC: 60 MG/DL (ref 40–150)
TSH SERPL-ACNC: 2.08 M[IU]/L (ref 0.27–4.2)
VLDLC SERPL-MCNC: 12 MG/DL (ref 5–37)
WBC # BLD AUTO: 5.75 10*3/UL (ref 4.8–10.8)

## 2020-03-13 ENCOUNTER — HOSPITAL ENCOUNTER (OUTPATIENT)
Dept: GENERAL RADIOLOGY | Facility: HOSPITAL | Age: 70
Discharge: HOME | End: 2020-03-13
Attending: NURSE PRACTITIONER

## 2020-03-13 LAB
CREAT BLD-MCNC: 0.6 MG/DL (ref 0.6–1.4)
GFR SERPLBLD BASED ON 1.73 SQ M-ARVRAT: >60 ML/MIN/{1.73_M2}

## 2020-09-09 ENCOUNTER — OFFICE VISIT CONVERTED (OUTPATIENT)
Dept: FAMILY MEDICINE CLINIC | Facility: CLINIC | Age: 70
End: 2020-09-09
Attending: NURSE PRACTITIONER

## 2020-09-10 ENCOUNTER — HOSPITAL ENCOUNTER (OUTPATIENT)
Dept: GENERAL RADIOLOGY | Facility: HOSPITAL | Age: 70
Discharge: HOME | End: 2020-09-10
Attending: NURSE PRACTITIONER

## 2020-09-18 ENCOUNTER — HOSPITAL ENCOUNTER (OUTPATIENT)
Dept: PHYSICAL THERAPY | Facility: CLINIC | Age: 70
Setting detail: RECURRING SERIES
Discharge: HOME | End: 2020-10-27
Attending: NURSE PRACTITIONER

## 2020-12-28 ENCOUNTER — HOSPITAL ENCOUNTER (OUTPATIENT)
Dept: GENERAL RADIOLOGY | Facility: HOSPITAL | Age: 70
Discharge: HOME | End: 2020-12-28
Attending: NURSE PRACTITIONER

## 2021-01-19 ENCOUNTER — HOSPITAL ENCOUNTER (OUTPATIENT)
Dept: GENERAL RADIOLOGY | Facility: HOSPITAL | Age: 71
Discharge: HOME | End: 2021-01-19
Attending: NURSE PRACTITIONER

## 2021-03-11 ENCOUNTER — OFFICE VISIT CONVERTED (OUTPATIENT)
Dept: FAMILY MEDICINE CLINIC | Facility: CLINIC | Age: 71
End: 2021-03-11
Attending: NURSE PRACTITIONER

## 2021-03-11 ENCOUNTER — HOSPITAL ENCOUNTER (OUTPATIENT)
Dept: GENERAL RADIOLOGY | Facility: HOSPITAL | Age: 71
Discharge: HOME | End: 2021-03-11
Attending: NURSE PRACTITIONER

## 2021-03-22 ENCOUNTER — HOSPITAL ENCOUNTER (OUTPATIENT)
Dept: LAB | Facility: HOSPITAL | Age: 71
Discharge: HOME | End: 2021-03-22
Attending: NURSE PRACTITIONER

## 2021-03-22 LAB
ALBUMIN SERPL-MCNC: 3.6 G/DL (ref 3.5–5)
ALBUMIN/GLOB SERPL: 1.2 {RATIO} (ref 1.4–2.6)
ALP SERPL-CCNC: 91 U/L (ref 43–160)
ALT SERPL-CCNC: 11 U/L (ref 10–40)
ANION GAP SERPL CALC-SCNC: 15 MMOL/L (ref 8–19)
AST SERPL-CCNC: 17 U/L (ref 15–50)
BASOPHILS # BLD AUTO: 0.04 10*3/UL (ref 0–0.2)
BASOPHILS NFR BLD AUTO: 0.7 % (ref 0–3)
BILIRUB SERPL-MCNC: 0.64 MG/DL (ref 0.2–1.3)
BUN SERPL-MCNC: 12 MG/DL (ref 5–25)
BUN/CREAT SERPL: 16 {RATIO} (ref 6–20)
CALCIUM SERPL-MCNC: 9.5 MG/DL (ref 8.7–10.4)
CHLORIDE SERPL-SCNC: 102 MMOL/L (ref 99–111)
CHOLEST SERPL-MCNC: 199 MG/DL (ref 107–200)
CHOLEST/HDLC SERPL: 2.8 {RATIO} (ref 3–6)
CONV ABS IMM GRAN: 0.01 10*3/UL (ref 0–0.2)
CONV CO2: 26 MMOL/L (ref 22–32)
CONV IMMATURE GRAN: 0.2 % (ref 0–1.8)
CONV TOTAL PROTEIN: 6.7 G/DL (ref 6.3–8.2)
CREAT UR-MCNC: 0.77 MG/DL (ref 0.5–0.9)
DEPRECATED RDW RBC AUTO: 42.7 FL (ref 36.4–46.3)
EOSINOPHIL # BLD AUTO: 0.13 10*3/UL (ref 0–0.7)
EOSINOPHIL # BLD AUTO: 2.2 % (ref 0–7)
ERYTHROCYTE [DISTWIDTH] IN BLOOD BY AUTOMATED COUNT: 13.4 % (ref 11.7–14.4)
GFR SERPLBLD BASED ON 1.73 SQ M-ARVRAT: >60 ML/MIN/{1.73_M2}
GLOBULIN UR ELPH-MCNC: 3.1 G/DL (ref 2–3.5)
GLUCOSE SERPL-MCNC: 87 MG/DL (ref 65–99)
HCT VFR BLD AUTO: 44.9 % (ref 37–47)
HDLC SERPL-MCNC: 71 MG/DL (ref 40–60)
HGB BLD-MCNC: 13.9 G/DL (ref 12–16)
LDLC SERPL CALC-MCNC: 115 MG/DL (ref 70–100)
LYMPHOCYTES # BLD AUTO: 1.66 10*3/UL (ref 1–5)
LYMPHOCYTES NFR BLD AUTO: 28.2 % (ref 20–45)
MCH RBC QN AUTO: 27.4 PG (ref 27–31)
MCHC RBC AUTO-ENTMCNC: 31 G/DL (ref 33–37)
MCV RBC AUTO: 88.6 FL (ref 81–99)
MONOCYTES # BLD AUTO: 0.41 10*3/UL (ref 0.2–1.2)
MONOCYTES NFR BLD AUTO: 7 % (ref 3–10)
NEUTROPHILS # BLD AUTO: 3.64 10*3/UL (ref 2–8)
NEUTROPHILS NFR BLD AUTO: 61.7 % (ref 30–85)
NRBC CBCN: 0 % (ref 0–0.7)
OSMOLALITY SERPL CALC.SUM OF ELEC: 287 MOSM/KG (ref 273–304)
PLATELET # BLD AUTO: 184 10*3/UL (ref 130–400)
PMV BLD AUTO: 9.7 FL (ref 9.4–12.3)
POTASSIUM SERPL-SCNC: 4 MMOL/L (ref 3.5–5.3)
RBC # BLD AUTO: 5.07 10*6/UL (ref 4.2–5.4)
SODIUM SERPL-SCNC: 139 MMOL/L (ref 135–147)
T4 FREE SERPL-MCNC: 1.1 NG/DL (ref 0.9–1.8)
TRIGL SERPL-MCNC: 64 MG/DL (ref 40–150)
TSH SERPL-ACNC: 1.67 M[IU]/L (ref 0.27–4.2)
VLDLC SERPL-MCNC: 13 MG/DL (ref 5–37)
WBC # BLD AUTO: 5.89 10*3/UL (ref 4.8–10.8)

## 2021-04-01 ENCOUNTER — HOSPITAL ENCOUNTER (EMERGENCY)
Facility: HOSPITAL | Age: 71
Discharge: HOME | End: 2021-04-01
Attending: EMERGENCY MEDICINE | Admitting: EMERGENCY MEDICINE

## 2021-04-01 ENCOUNTER — APPOINTMENT (OUTPATIENT)
Dept: GENERAL RADIOLOGY | Facility: HOSPITAL | Age: 71
End: 2021-04-01

## 2021-04-01 VITALS
BODY MASS INDEX: 37.77 KG/M2 | HEART RATE: 67 BPM | SYSTOLIC BLOOD PRESSURE: 172 MMHG | DIASTOLIC BLOOD PRESSURE: 91 MMHG | OXYGEN SATURATION: 97 % | HEIGHT: 65 IN | RESPIRATION RATE: 18 BRPM | TEMPERATURE: 97.5 F

## 2021-04-01 DIAGNOSIS — R07.89 ATYPICAL CHEST PAIN: Primary | ICD-10-CM

## 2021-04-01 LAB
ALBUMIN SERPL-MCNC: 3.8 G/DL (ref 3.5–5.2)
ALBUMIN/GLOB SERPL: 1.5 G/DL
ALP SERPL-CCNC: 89 U/L (ref 39–117)
ALT SERPL W P-5'-P-CCNC: 13 U/L (ref 1–33)
ANION GAP SERPL CALCULATED.3IONS-SCNC: 9.4 MMOL/L (ref 5–15)
AST SERPL-CCNC: 22 U/L (ref 1–32)
BASOPHILS # BLD AUTO: 0.04 10*3/MM3 (ref 0–0.2)
BASOPHILS NFR BLD AUTO: 0.5 % (ref 0–1.5)
BILIRUB SERPL-MCNC: 0.6 MG/DL (ref 0–1.2)
BUN SERPL-MCNC: 10 MG/DL (ref 8–23)
BUN/CREAT SERPL: 15.6 (ref 7–25)
CALCIUM SPEC-SCNC: 8.6 MG/DL (ref 8.6–10.5)
CHLORIDE SERPL-SCNC: 101 MMOL/L (ref 98–107)
CO2 SERPL-SCNC: 27.6 MMOL/L (ref 22–29)
CREAT SERPL-MCNC: 0.64 MG/DL (ref 0.57–1)
DEPRECATED RDW RBC AUTO: 45.2 FL (ref 37–54)
EOSINOPHIL # BLD AUTO: 0.19 10*3/MM3 (ref 0–0.4)
EOSINOPHIL NFR BLD AUTO: 2.5 % (ref 0.3–6.2)
ERYTHROCYTE [DISTWIDTH] IN BLOOD BY AUTOMATED COUNT: 14.1 % (ref 12.3–15.4)
GFR SERPL CREATININE-BSD FRML MDRD: 92 ML/MIN/1.73
GLOBULIN UR ELPH-MCNC: 2.5 GM/DL
GLUCOSE SERPL-MCNC: 103 MG/DL (ref 65–99)
HCT VFR BLD AUTO: 43.3 % (ref 34–46.6)
HGB BLD-MCNC: 14.1 G/DL (ref 12–15.9)
IMM GRANULOCYTES # BLD AUTO: 0.02 10*3/MM3 (ref 0–0.05)
IMM GRANULOCYTES NFR BLD AUTO: 0.3 % (ref 0–0.5)
LYMPHOCYTES # BLD AUTO: 2.2 10*3/MM3 (ref 0.7–3.1)
LYMPHOCYTES NFR BLD AUTO: 29.3 % (ref 19.6–45.3)
MCH RBC QN AUTO: 28.6 PG (ref 26.6–33)
MCHC RBC AUTO-ENTMCNC: 32.6 G/DL (ref 31.5–35.7)
MCV RBC AUTO: 87.8 FL (ref 79–97)
MONOCYTES # BLD AUTO: 0.49 10*3/MM3 (ref 0.1–0.9)
MONOCYTES NFR BLD AUTO: 6.5 % (ref 5–12)
NEUTROPHILS NFR BLD AUTO: 4.58 10*3/MM3 (ref 1.7–7)
NEUTROPHILS NFR BLD AUTO: 60.9 % (ref 42.7–76)
NRBC BLD AUTO-RTO: 0 /100 WBC (ref 0–0.2)
NT-PROBNP SERPL-MCNC: 431.2 PG/ML (ref 0–900)
PLATELET # BLD AUTO: 165 10*3/MM3 (ref 140–450)
PMV BLD AUTO: 8.9 FL (ref 6–12)
POTASSIUM SERPL-SCNC: 3.6 MMOL/L (ref 3.5–5.2)
PROT SERPL-MCNC: 6.3 G/DL (ref 6–8.5)
QT INTERVAL: 388 MS
RBC # BLD AUTO: 4.93 10*6/MM3 (ref 3.77–5.28)
SODIUM SERPL-SCNC: 138 MMOL/L (ref 136–145)
TROPONIN T SERPL-MCNC: <0.01 NG/ML (ref 0–0.03)
WBC # BLD AUTO: 7.52 10*3/MM3 (ref 3.4–10.8)

## 2021-04-01 PROCEDURE — 84484 ASSAY OF TROPONIN QUANT: CPT | Performed by: EMERGENCY MEDICINE

## 2021-04-01 PROCEDURE — 83880 ASSAY OF NATRIURETIC PEPTIDE: CPT | Performed by: EMERGENCY MEDICINE

## 2021-04-01 PROCEDURE — 85025 COMPLETE CBC W/AUTO DIFF WBC: CPT | Performed by: EMERGENCY MEDICINE

## 2021-04-01 PROCEDURE — 71045 X-RAY EXAM CHEST 1 VIEW: CPT

## 2021-04-01 PROCEDURE — 80053 COMPREHEN METABOLIC PANEL: CPT | Performed by: EMERGENCY MEDICINE

## 2021-04-01 PROCEDURE — 93010 ELECTROCARDIOGRAM REPORT: CPT | Performed by: INTERNAL MEDICINE

## 2021-04-01 PROCEDURE — 93005 ELECTROCARDIOGRAM TRACING: CPT | Performed by: EMERGENCY MEDICINE

## 2021-04-01 PROCEDURE — 99283 EMERGENCY DEPT VISIT LOW MDM: CPT

## 2021-04-01 PROCEDURE — 93005 ELECTROCARDIOGRAM TRACING: CPT

## 2021-04-01 RX ORDER — SODIUM CHLORIDE 0.9 % (FLUSH) 0.9 %
10 SYRINGE (ML) INJECTION AS NEEDED
Status: DISCONTINUED | OUTPATIENT
Start: 2021-04-01 | End: 2021-04-01 | Stop reason: HOSPADM

## 2021-04-01 NOTE — ED PROVIDER NOTES
EMERGENCY DEPARTMENT ENCOUNTER    CHIEF COMPLAINT  Chief Complaint: Chest pain  History given by: Patient  History limited by: None  Room Number: 13/13  PMD: Jonelle Joseph APRN      HPI:  Pt is a 70 y.o. female who presents complaining of sudden onset of left-sided anterior chest discomfort that she describes as a achy sensation with radiation into her neck and down her left arm.  She states that the symptoms began suddenly at approximately 3 PM yesterday and lasted approximately 15 minutes with full resolution following.  She denies any shortness of breath, nausea/vomiting, diaphoresis, back pain, or any known sick contacts.  Symptoms were moderate in intensity but now have fully resolved.  The patient denies ischemic cardiac history but says she does have a history of SVT previously.  There are no aggravating nor alleviating factors nor any treatment rendered prior to ED arrival.      PAST MEDICAL HISTORY  Active Ambulatory Problems     Diagnosis Date Noted   • No Active Ambulatory Problems     Resolved Ambulatory Problems     Diagnosis Date Noted   • No Resolved Ambulatory Problems     Past Medical History:   Diagnosis Date   • Allergic rhinitis    • Chronic bronchitis (CMS/HCC)    • Perianal abscess    • Sinus bradycardia        PAST SURGICAL HISTORY  Past Surgical History:   Procedure Laterality Date   • CARDIAC ELECTROPHYSIOLOGY STUDY AND ABLATION     • HYSTERECTOMY         FAMILY HISTORY  Family History   Problem Relation Age of Onset   • Heart disease Sister    • Ovarian cancer Sister    • Liver cancer Sister    • Heart disease Brother    • Hypertension Sister    • Pancreatic cancer Sister        SOCIAL HISTORY  Social History     Socioeconomic History   • Marital status:      Spouse name: Not on file   • Number of children: Not on file   • Years of education: Not on file   • Highest education level: Not on file   Tobacco Use   • Smoking status: Never Smoker   • Smokeless tobacco: Current User   •  Tobacco comment: CAFFEINE USE   Substance and Sexual Activity   • Alcohol use: Yes     Comment: RARE       ALLERGIES  Meperidine    REVIEW OF SYSTEMS  Review of Systems   Constitutional: Negative for fever.   HENT: Negative for sore throat.    Eyes: Negative.    Respiratory: Negative for cough and shortness of breath.    Cardiovascular: Positive for chest pain.   Gastrointestinal: Negative for abdominal pain, diarrhea and vomiting.   Genitourinary: Negative for dysuria.   Musculoskeletal: Negative for neck pain.   Skin: Negative for rash.   Allergic/Immunologic: Negative.    Neurological: Negative for weakness, numbness and headaches.   Hematological: Negative.    Psychiatric/Behavioral: Negative.    All other systems reviewed and are negative.      PHYSICAL EXAM  ED Triage Vitals [04/01/21 0311]   Temp Heart Rate Resp BP SpO2   97.5 °F (36.4 °C) 85 15 -- 98 %      Temp src Heart Rate Source Patient Position BP Location FiO2 (%)   Tympanic Monitor -- -- --       Physical Exam  Vitals and nursing note reviewed.   Constitutional:       General: She is not in acute distress.  HENT:      Head: Normocephalic and atraumatic.   Eyes:      Pupils: Pupils are equal, round, and reactive to light.   Cardiovascular:      Rate and Rhythm: Normal rate and regular rhythm.      Heart sounds: Normal heart sounds.   Pulmonary:      Effort: Pulmonary effort is normal. No respiratory distress.      Breath sounds: Normal breath sounds.   Abdominal:      Palpations: Abdomen is soft.      Tenderness: There is no abdominal tenderness. There is no guarding or rebound.   Musculoskeletal:         General: Normal range of motion.      Cervical back: Normal range of motion and neck supple.   Skin:     General: Skin is warm and dry.      Findings: No rash.   Neurological:      Mental Status: She is alert and oriented to person, place, and time.      Sensory: Sensation is intact.   Psychiatric:         Mood and Affect: Mood and affect normal.          LAB RESULTS  Lab Results (last 24 hours)     Procedure Component Value Units Date/Time    CBC & Differential [[id number]]  (Normal) Collected: 04/01/21 0421    Specimen: Blood Updated: 04/01/21 0435    Narrative:      The following orders were created for panel order CBC & Differential.  Procedure                               Abnormality         Status                     ---------                               -----------         ------                     CBC Auto Differential[[id number]]         Normal              Final result                 Please view results for these tests on the individual orders.    Comprehensive Metabolic Panel [[id number]]  (Abnormal) Collected: 04/01/21 0421    Specimen: Blood Updated: 04/01/21 0457     Glucose 103 mg/dL      BUN 10 mg/dL      Creatinine 0.64 mg/dL      Sodium 138 mmol/L      Potassium 3.6 mmol/L      Chloride 101 mmol/L      CO2 27.6 mmol/L      Calcium 8.6 mg/dL      Total Protein 6.3 g/dL      Albumin 3.80 g/dL      ALT (SGPT) 13 U/L      AST (SGOT) 22 U/L      Alkaline Phosphatase 89 U/L      Total Bilirubin 0.6 mg/dL      eGFR Non African Amer 92 mL/min/1.73      Globulin 2.5 gm/dL      A/G Ratio 1.5 g/dL      BUN/Creatinine Ratio 15.6     Anion Gap 9.4 mmol/L     Narrative:      GFR Normal >60  Chronic Kidney Disease <60  Kidney Failure <15      BNP [[id number]]  (Normal) Collected: 04/01/21 0421    Specimen: Blood Updated: 04/01/21 0456     proBNP 431.2 pg/mL     Narrative:      Among patients with dyspnea, NT-proBNP is highly sensitive for the detection of acute congestive heart failure. In addition NT-proBNP of <300 pg/ml effectively rules out acute congestive heart failure with 99% negative predictive value.    Results may be falsely decreased if patient taking Biotin.      Troponin [[id number]]  (Normal) Collected: 04/01/21 0421    Specimen: Blood Updated: 04/01/21 0457     Troponin T <0.010 ng/mL     Narrative:      Troponin T Reference Range:  <= 0.03  ng/mL-   Negative for AMI  >0.03 ng/mL-     Abnormal for myocardial necrosis.  Clinicians would have to utilize clinical acumen, EKG, Troponin and serial changes to determine if it is an Acute Myocardial Infarction or myocardial injury due to an underlying chronic condition.       Results may be falsely decreased if patient taking Biotin.      CBC Auto Differential [[id number]]  (Normal) Collected: 04/01/21 0421    Specimen: Blood Updated: 04/01/21 0435     WBC 7.52 10*3/mm3      RBC 4.93 10*6/mm3      Hemoglobin 14.1 g/dL      Hematocrit 43.3 %      MCV 87.8 fL      MCH 28.6 pg      MCHC 32.6 g/dL      RDW 14.1 %      RDW-SD 45.2 fl      MPV 8.9 fL      Platelets 165 10*3/mm3      Neutrophil % 60.9 %      Lymphocyte % 29.3 %      Monocyte % 6.5 %      Eosinophil % 2.5 %      Basophil % 0.5 %      Immature Grans % 0.3 %      Neutrophils, Absolute 4.58 10*3/mm3      Lymphocytes, Absolute 2.20 10*3/mm3      Monocytes, Absolute 0.49 10*3/mm3      Eosinophils, Absolute 0.19 10*3/mm3      Basophils, Absolute 0.04 10*3/mm3      Immature Grans, Absolute 0.02 10*3/mm3      nRBC 0.0 /100 WBC           I ordered the above labs and reviewed the results    RADIOLOGY  XR Chest 1 View   Final Result         Electronically signed by Eliu Her DO on 04-01-21 at 0429           I ordered the above noted radiological studies. Interpreted by radiologist.  Reviewed by me in PACS.       PROCEDURES  Procedures  EKG          EKG time: 0307  Rhythm/Rate: NSR, 81  P waves and NY: nml  QRS, axis: LAFB, LAD   ST and T waves: nml     Interpreted Contemporaneously by me, independently viewed  unchanged compared to prior 9/22/14      PROGRESS AND CONSULTS     The patient was wearing a facemask upon entrance into the room and remained in such throughout their visit.  I was wearing PPE including a facemask, eye protection, as well as gloves at any point entering the room and throughout the visit    0600  Upon reevaluation, the patient is  resting comfortably without any further complaints of chest discomfort.  I did inform her that all tests in the emergency room are unremarkable for any acute abnormality.  With her pain now resolved for approximately 15 hours, I do believe she is stable for discharge and will have her follow-up with [place] [facility]ology as needed.  All questions have been answered.      MEDICAL DECISION MAKING  Results were reviewed/discussed with the patient and they were also made aware of online access. Pt also made aware that some labs, such as cultures, will not be resulted during ER visit and follow up with PMD is necessary.     MDM  Number of Diagnoses or Management Options     Amount and/or Complexity of Data Reviewed  Clinical lab tests: ordered and reviewed  Tests in the radiology section of CPT®: ordered and reviewed  Tests in the medicine section of CPT®: ordered and reviewed  Review and summarize past medical records: yes (Upon medical records review, I did review an office visit to her cardiologist on 10/15/2018 secondary to unstable angina as well as SVT.)  Independent visualization of images, tracings, or specimens: yes (Unremarkable chest x-ray)           DIAGNOSIS  Final diagnoses:   Atypical chest pain       DISPOSITION  DISCHARGE    Patient discharged in stable condition.    Reviewed implications of results, diagnosis, meds, responsibility to follow up, warning signs and symptoms of possible worsening, potential complications and reasons to return to ER.    Patient/Family voiced understanding of above instructions.    Discussed plan for discharge, as there is no emergent indication for admission. Patient referred to primary care provider for BP management due to today's BP. Pt/family is agreeable and understands need for follow up and repeat testing.  Pt is aware that discharge does not mean that nothing is wrong but it indicates no emergency is present that requires admission and they must continue care with  follow-up as given below or physician of their choice.     FOLLOW-UP  Jonelle Joseph, APRN  [address] [facility]  [address]  [place] [address]  [phone]    Schedule an appointment as soon as possible for a visit       [facility] CARDIOLOGY  [address] [facility] [address]  [facility] [address] [phone]  Schedule an appointment as soon as possible for a visit            Medication List      No changes were made to your prescriptions during this visit.           Latest Documented Vital Signs:  As of 07:17 EDT  BP- 172/91 HR- 67 Temp- 97.5 °F (36.4 °C) (Tympanic) O2 sat- 97%         Vipin Bess MD  04/01/21 0718

## 2021-04-01 NOTE — ED NOTES
Pt reports left sided chest pains radiating into her left neck since 1500 yesterday.     Pt placed in mask and staff wearing appropriate ppe at the time of pt arrival.      Catalino Gould RN  04/01/21 1792

## 2021-05-13 NOTE — PROGRESS NOTES
Progress Note      Patient Name: Samina Marin   Patient ID: [id number]   Sex: Female   YOB: 1950    Primary Care Provider: Jonelle BAZAN   Referring Provider: Jonelle BAZAN    Visit Date: September 9, 2020    Provider: HORTENSIA Schmitt   Location: [facility]   Location Address: [address] [place] [address], [address]  [place], [place]  [id number]   Location Phone: [phone]          Chief Complaint  · back pain      History Of Present Illness  Samina Marin is a 69 year old /White female who presents for evaluation and treatment of:      Pt is here for back pain, she and her friend fell a month ago, she landed on her back and her friend landed on her, since then she has had back pain in the middle of her back that radiates down to her right hip and leg. She has tried heating and is taking Ibuprofen and Tylenol for relief which helps, but makes her sleepy.    Pt is due for a mammogram and she would like to get scheduled.       Past Medical History  Disease Name Date Onset Notes   Allergic rhinitis, chronic --  --    Arthritis --  --    Asthma --  --    Bone Density Screening 07/11/2019 osteopenia   Carpal tunnel syndrome, bilateral --  --    Cervicalgia --  --    DDD (degenerative disc disease), lumbar --  --    Fibromyalgia 03/05/2018 --    GERD --  --    Hyperlipidemia --  --    Low back pain --  --    Major depressive disorder 03/05/2018 --    Mood disorder --  --    Obesity --  --    Postmenopausal --  --    Screening Mammogram 07/11/2019 normal   Skin cancer --  Hx of skin cancer- JERONIMO Guzman on 3/11/16 Office note   Urge and stress incontinence --  --    Vitamin D Deficiency --  --          Past Surgical History  Procedure Name Date Notes   Back 1998 Orthopedic surgery s/p lumbar discectomy   Cardiac --  --    Cardiac Ablasion 2015  in [place]-Cardiology   Colonoscopy 7/18/2017 Normal-Dr. Pantoja   Hand surgery --  --    Hysterectomy 1973 --     shoulder repair 1985 Rt Shoulder         Medication List  Name Date Started Instructions   aspirin 81 mg oral tablet,delayed release (DR/EC) 03/05/2018 take 1 tablet (81 mg) by oral route once daily   duloxetine 60 mg oral capsule,delayed release(DR/EC) 03/10/2020 TAKE 1 CAPSULE DAILY (NEED APPOINTMENT FOR REFILLS)   metoprolol tartrate 25 mg oral tablet 09/08/2020 TAKE 1 TABLET TWICE A DAY   Nexium 20 mg oral capsule,delayed release(DR/EC)  1 PO 1-2 times daily as needed   tolterodine 2 mg oral tablet 03/10/2020 TAKE 1 TABLET (2 MG) BY ORAL ROUTE 2 TIMES PER DAY FOR 90 DAYS         Allergy List  Allergen Name Date Reaction Notes   Demerol --  --  --          Family Medical History  Disease Name Relative/Age Notes   Pancreatic Neoplasm, Malignant Sister/   --    Heart Disease Sister/   --    Cancer, Unspecified Father/  Mother/  Sister/   --    Breast Neoplasm Aunt/   --    Colon Cancer Uncle/   --    Lung cancer Father/  Mother/  Uncle/   --    Ovarian Cancer, Family History Sister/   --          Reproductive History  Menstrual   Age Menarche: 12   Pregnancy Summary   Total Pregnancies: 3 Full Term: 3 Premature: 0   Ab Induced: 0 Ab Spontaneous: 0 Ectopics: 0   Multiples: 0 Living: 3         Social History  Finding Status Start/Stop Quantity Notes   Alcohol Never 0/0 --  --    Caffeine Current some day 0/0 --  drinks occasionally; tea   Second hand smoke exposure Never 0/0 --  no   Tobacco Never 0/0 --  never a smoker    --  --/-- --  --          Immunizations  NameDate Admin Mfg Trade Name Lot Number Route Inj VIS Given VIS Publication   InfluenzaRefused 04/22/2019 NE Not Entered  NE NE     Comments: Has never taken a flu shot         Review of Systems  · Constitutional  o Denies  o : fever, fatigue, weight loss, weight gain  · Breasts  o Denies  o : lumps, tenderness, swelling, nipple discharge  · Cardiovascular  o Denies  o : lower extremity edema, claudication, chest pressure,  "palpitations  · Respiratory  o Denies  o : shortness of breath, wheezing, cough, hemoptysis, dyspnea on exertion  · Gastrointestinal  o Denies  o : nausea, vomiting, diarrhea, constipation, abdominal pain  · Musculoskeletal  o Admits  o : back pain, hip pain on the right      Vitals  Date Time BP Position Site L\R Cuff Size HR RR TEMP (F) WT  HT  BMI kg/m2 BSA m2 O2 Sat HC       11/05/2018 11:29 /80 Sitting    75 - R   228lbs 0oz 5'  6\" 36.8 2.19     04/22/2019 08:46 /92 Sitting    81 - R   228lbs 6oz 5'  6\" 36.86 2.2     03/10/2020 11:22 /90 Sitting    91 - R   238lbs 0oz 5'  6\" 38.41 2.24 94 %    09/09/2020 03:20 /82 Sitting    88 - R   236lbs 4oz 5'  6\" 38.13 2.23 90 %          Physical Examination  · Constitutional  o Appearance  o : well-nourished, well developed, alert, in no acute distress  · Respiratory  o Respiratory Effort  o : breathing unlabored  o Auscultation of Lungs  o : normal breath sounds throughout  · Cardiovascular  o Heart  o :   § Auscultation of Heart  § : regular rate and rhythm, no murmurs, gallops or rubs  § Palpation of Heart  § : normal apical impulse, no cardiac thrill present  o Peripheral Vascular System  o :   § Carotid Arteries  § : normal pulses bilaterally, no bruits present  § Pedal Pulses  § : pulses 2 bilaterally  § Extremities  § : no cyanosis, clubbing or edema; less than 2 second refill noted  · Gastrointestinal  o Abdominal Examination  o : abdomen nontender to palpation, tone normal without rigidity or guarding, no masses present, abdominal contour scaphoid  o Liver and spleen  o : no hepatomegaly present, liver nontender to palpation, spleen not palpable  · Musculoskeletal  o Right Upper Extremity  o :   § Inspection/Palpation  § : no tenderness to palpation  § Range of Motion  § : range of motion normal, no joint crepitus or pain with motion present  o Left Upper Extremity  o :   § Inspection/Palpation  § : no tenderness to palpation  § Range of " Motion  § : range of motion normal, no joint crepitus present, no pain with joint motion  o Right Lower Extremity  o :   § Inspection/Palpation  § : no joint or limb tenderness to palpation  § Range of Motion  § : range of motion normal, no joint crepitations present, pain in right hip with ROM  o Left Lower Extremity  o :   § Inspection/Palpation  § : no joint or limb tenderness to palpation  § Range of Motion  § : range of motion normal, no joint crepitations present, no pain on motion  · Skin and Subcutaneous Tissue  o General Inspection  o : no rashes or lesions present, no areas of discoloration  · Neurologic  o Mental Status Examination  o :   § Orientation  § : grossly oriented to person, place and time  o Cranial Nerves  o : cranial nerves intact and symmetric throughout  · Psychiatric  o Mood and Affect  o : mood normal, affect appropriate, denies any SI/HI  · Back  o Inspection  o : no redness, no deformities  o Palpation  o : tednerness present in lumbar region with flexion  o Range of Motion  o : flexion normal- 60 to 90 degrees  o Gait  o : normal gait          Assessment  · Visit for screening mammogram     V76.12/Z12.31  · Back pain     724.5/M54.9  · Acute Right Hip pain     719.45/M25.559    Problems Reconciled  Plan  · Orders  o Screening Mammography; Bilateral 3D (92381, 35442, ) - V76.12/Z12.31 - 12/28/2020   DANIELLE 12/28/2020 1pm  o ACO-39: Current medications updated and reviewed () - - 09/09/2020  o 6.00 - Kenalog Injection 60mg (-2) - - 09/09/2020   Injection - Kenalog 60 mg; Dose: 60 mg; Site: Left Gluteus; Route: intramuscular; Date: 09/09/2020 15:36:41; Exp: 01/20/2022; Lot: [id number]; Mfg: N/A; TradeName: triamcinolone; Location: [facility]; Administered By: Eufemia David MA; Comment: Pt tolerated injection well  o Lumbar Spine Complete X-Ray [facility] Preferred View (01105) - - 09/09/2020  o Hip xray right 2 or more views (includes AP Pelvis) [facility] Preferred  View. ([id number]) - - 09/09/2020  o PHYSICAL THERAPY CONSULTATION ([facility]) - - 09/09/2020  · Medications  o alendronate 70 mg oral tablet   SIG: TAKE 1 TABLET EVERY WEEK   DISP: (12) Tablet with 3 refills  Discontinued on 09/09/2020     o Medications have been Reconciled  o Transition of Care or Provider Policy  · Instructions  o Patient was educated/instructed on their diagnosis, treatment and medications prior to discharge from the clinic today.  o Patient instructed to seek medical attention urgently for new or worsening symptoms.  o Call the office with any concerns or questions.  · Disposition  o Call or Return if symptoms worsen or persist.            Electronically Signed by: HORTENSIA Schmitt -Author on September 9, 2020 03:45:36 PM

## 2021-05-14 VITALS
SYSTOLIC BLOOD PRESSURE: 144 MMHG | HEIGHT: 66 IN | HEART RATE: 88 BPM | BODY MASS INDEX: 37.97 KG/M2 | WEIGHT: 236.25 LBS | OXYGEN SATURATION: 90 % | DIASTOLIC BLOOD PRESSURE: 82 MMHG

## 2021-05-14 VITALS
SYSTOLIC BLOOD PRESSURE: 140 MMHG | DIASTOLIC BLOOD PRESSURE: 67 MMHG | OXYGEN SATURATION: 93 % | HEIGHT: 66 IN | WEIGHT: 236.37 LBS | HEART RATE: 107 BPM | BODY MASS INDEX: 37.99 KG/M2

## 2021-05-15 VITALS
HEART RATE: 91 BPM | DIASTOLIC BLOOD PRESSURE: 90 MMHG | OXYGEN SATURATION: 94 % | HEIGHT: 66 IN | BODY MASS INDEX: 38.25 KG/M2 | WEIGHT: 238 LBS | SYSTOLIC BLOOD PRESSURE: 162 MMHG

## 2021-05-15 VITALS
SYSTOLIC BLOOD PRESSURE: 139 MMHG | BODY MASS INDEX: 36.7 KG/M2 | DIASTOLIC BLOOD PRESSURE: 92 MMHG | HEART RATE: 81 BPM | HEIGHT: 66 IN | WEIGHT: 228.37 LBS

## 2021-05-16 VITALS
DIASTOLIC BLOOD PRESSURE: 80 MMHG | TEMPERATURE: 98.5 F | OXYGEN SATURATION: 93 % | SYSTOLIC BLOOD PRESSURE: 140 MMHG | BODY MASS INDEX: 36.56 KG/M2 | RESPIRATION RATE: 12 BRPM | HEART RATE: 92 BPM | HEIGHT: 66 IN | WEIGHT: 227.5 LBS

## 2021-05-16 VITALS
HEART RATE: 75 BPM | BODY MASS INDEX: 36.64 KG/M2 | SYSTOLIC BLOOD PRESSURE: 126 MMHG | HEIGHT: 66 IN | DIASTOLIC BLOOD PRESSURE: 80 MMHG | WEIGHT: 228 LBS

## 2021-05-16 VITALS
WEIGHT: 223.37 LBS | HEART RATE: 82 BPM | BODY MASS INDEX: 35.9 KG/M2 | DIASTOLIC BLOOD PRESSURE: 96 MMHG | HEIGHT: 66 IN | SYSTOLIC BLOOD PRESSURE: 140 MMHG

## 2021-05-17 ENCOUNTER — OFFICE VISIT CONVERTED (OUTPATIENT)
Dept: FAMILY MEDICINE CLINIC | Facility: CLINIC | Age: 71
End: 2021-05-17
Attending: NURSE PRACTITIONER

## 2021-05-17 ENCOUNTER — HOSPITAL ENCOUNTER (OUTPATIENT)
Dept: LAB | Facility: HOSPITAL | Age: 71
Discharge: HOME | End: 2021-05-17
Attending: NURSE PRACTITIONER

## 2021-05-18 LAB
EST. AVERAGE GLUCOSE BLD GHB EST-MCNC: 103 MG/DL
HBA1C MFR BLD: 5.2 % (ref 3.5–5.7)

## 2021-06-01 ENCOUNTER — HOSPITAL ENCOUNTER (OUTPATIENT)
Dept: MRI IMAGING | Facility: HOSPITAL | Age: 71
Discharge: HOME | End: 2021-06-01
Attending: NURSE PRACTITIONER

## 2021-06-05 NOTE — PROGRESS NOTES
Progress Note      Patient Name: Samina Marin   Patient ID: [id number]   Sex: Female   YOB: 1950    Primary Care Provider: Jonelle BAZAN   Referring Provider: Jonelle BAZAN    Visit Date: May 17, 2021    Provider: HORTENSIA Schmitt   [place] [facility] [place]   Location Address: [address] [place] [address], [address]  [place], [place]  [id number]   Location Phone: [phone]          Chief Complaint  · Annual Wellness Exam      History Of Present Illness  The patient is a 70 year old /White female who has come to this office for her Annual Wellness Visit.   Her Primary Care Provider is Jonelle BAZAN. Her comprehensive Care Team list, including suppliers, has been updated on the Facesheet. Her medical/family history, height, weight, BMI, and blood pressure have been reviewed and are in the chart. The Health Risk Assessment has been completed and scanned in the chart.   Medications are listed in the medication list.   The active problem list includes: Allergic rhinitis, chronic, Arthritis, Asthma, Cervicalgia, Fibromyalgia, GERD, Hyperlipidemia, Low back pain, Major depressive disorder, Postmenopausal, and Vitamin D Deficiency   The patient does not have a history of substance use.   Patient reports her diet is adequate.   The Mini-Cog has been administered and is scanned in chart. The results are negative. Her cognitive function is without limitation.   A hearing loss screen was completed today and the result is negative.   Patient does not have any risk factors for depression. Patient completed the PHQ-9 today and it has been scanned in the chart. The total score is 10-14.   The Timed Up and Go screen was administered today and the result is negative.   The Eden Index of Bay in ADLs indicated full function (score of 6).   A Falls Risk Assessment has been completed, including a review of home fall hazards and medication review.   Overall, the patient's  functional ability is noted by this provider to be within normal limits. Her level of safety is noted to be within normal limits. Her balance/gait is within normal limits. There have been no falls in the past year. Patient-specific home safety recommendations have been reviewed and a copy has been given to patient.   She denies issues with leaking urine.   There are no additional risk factors identified.   Living Will/Advanced Directive has not previously been completed.   Personalized health advice was given to the patient and a written health screening schedule was established; see Plan for details.       Past Medical History  Disease Name Date Onset Notes   Allergic rhinitis, chronic --  --    Arthritis --  --    Asthma --  --    Bone Density Screening 07/11/2019 osteopenia   Carpal tunnel syndrome, bilateral --  --    Cervicalgia --  --    DDD (degenerative disc disease), lumbar --  --    Fibromyalgia 03/05/2018 --    GERD --  --    Hyperlipidemia --  --    Low back pain --  --    Major depressive disorder 03/05/2018 --    Mood disorder --  --    Obesity --  --    Postmenopausal --  --    Screening Mammogram 07/11/2019 normal   Skin cancer --  Hx of skin cancer- JERONIMO Guzman on 3/11/16 Office note   Urge and stress incontinence --  --    Vitamin D Deficiency --  --          Past Surgical History  Procedure Name Date Notes   Back 1998 Orthopedic surgery s/p lumbar discectomy   Cardiac --  --    Cardiac Ablasion 2015  in [place]   Colonoscopy 7/18/2017 Normal-Dr. Pantoja   Hand surgery --  --    Hysterectomy 1973 --    shoulder repair 1985 Rt Shoulder         Medication List  Name Date Started Instructions   amlodipine 5 mg oral tablet  take 1 tablet (5 mg) by oral route once daily   aspirin 81 mg oral tablet,delayed release (DR/EC) 03/05/2018 take 1 tablet (81 mg) by oral route once daily   duloxetine 60 mg oral capsule,delayed release(DR/EC) 03/10/2020 TAKE 1 CAPSULE DAILY (NEED  "APPOINTMENT FOR REFILLS)   metoprolol tartrate 25 mg oral tablet  take 1 tablet (25 mg) by oral route once daily   Nexium 20 mg oral capsule,delayed release(DR/EC)  1 PO 1-2 times daily as needed   omeprazole 40 mg oral capsule,delayed release(DR/EC)  take 1 capsule (40 mg) by oral route once daily before a meal   tolterodine 2 mg oral tablet 03/10/2020 TAKE 1 TABLET (2 MG) BY ORAL ROUTE 2 TIMES PER DAY FOR 90 DAYS         Allergy List  Allergen Name Date Reaction Notes   Demerol --  --  --        Allergies Reconciled  Family Medical History  Disease Name Relative/Age Notes   Pancreatic Neoplasm, Malignant Sister/   --    Heart Disease Sister/   --    Cancer, Unspecified Father/  Mother/  Sister/   --    Breast Neoplasm Aunt/   --    Colon Cancer Uncle/   --    Lung cancer Father/  Mother/  Uncle/   --    Ovarian Cancer, Family History Sister/   --          Reproductive History  Menstrual   Age Menarche: 12   Pregnancy Summary   Total Pregnancies: 3 Full Term: 3 Premature: 0   Ab Induced: 0 Ab Spontaneous: 0 Ectopics: 0   Multiples: 0 Living: 3         Social History  Finding Status Start/Stop Quantity Notes   Alcohol Never 0/0 --  --    Caffeine Current some day 0/0 --  drinks occasionally; tea   Second hand smoke exposure Never 0/0 --  no   Tobacco Never 0/0 --  never a smoker    --  --/-- --  --          Immunizations  NameDate Admin Mfg Trade Name Lot Number Route Inj VIS Given VIS Publication   InfluenzaRefused 04/22/2019 NE Not Entered  NE NE     Comments: Has never taken a flu shot         Vitals  Date Time BP Position Site L\R Cuff Size HR RR TEMP (F) WT  HT  BMI kg/m2 BSA m2 O2 Sat FR L/min FiO2 HC       05/17/2021 10:37 /67 Sitting    86 - R   235lbs 6oz 5'  6\" 37.99 2.23 96 %                Assessment  · Encounter for Medicare annual wellness exam     V70.0/Z00.00  · Screening for depression     V79.0/Z13.89  · Screening for alcoholism     V79.1/Z13.39    Problems " Reconciled  Plan  · Orders  o Falls Risk Assessment Completed (3288F) - V70.0/Z00.00 - 05/17/2021  o Brief hearing screening (written) [facility] () - V70.0/Z00.00 - 05/17/2021  o Annual Wellness Visit-includes a Personalized Prevention Plan of Service (PPS), SUBSEQUENT VISIT (Medicare) () - V70.0/Z00.00 - 05/17/2021  o ACO-13: Fall Risk Screening with no falls in past year or only one fall without injury in the past year (1101F) - V70.0/Z00.00 - 05/17/2021  o Presence or absence of urinary incontinence assessed ([facility]) (1090F) - V70.0/Z00.00 - 05/17/2021  o ACO-18: Positive screen for clinical depression using a standardized tool and a follow-up plan documented () - V79.0/Z13.89 - 05/17/2021  o Annual alcohol screening using the AUDIT-C tool, 15 minutes [facility] ([address], ) - V79.1/Z13.39 - 05/17/2021  o Negative alcohol screening () - V79.1/Z13.39 - 05/17/2021  o ACO-19: Colorectal cancer screening results documented and reviewed (3017F) - - 05/17/2021  o ACO-20: Screening Mammography documented and reviewed () - - 05/17/2021  o Influenza immunization was not ordered or administered for reasons documented by clinician () - - 05/17/2021  o ACO-15: Pneumococcal Vaccine Administered or Previously Received (4040F) - - 05/17/2021  o ACO-39: Current medications updated and reviewed (, 1159F) - - 05/17/2021  o ACO - Pt declines to or was not able to provide an Advance Care Plan or name a Surrogate Decision Maker (1124F) - - 05/17/2021  · Medications  o Medications have been Reconciled  o Transition of Care or Provider Policy  · Instructions  o Health Risk Assessment has been reviewed with the patient.  o Written health screening schedule for next 5-10 years was established with patient; information scanned in chart and given/mailed to patient.  o Fall prevention methods discussed and a copy of recommendations given/mailed to patient.  o Today's PHQ-9 score is: 11  o Positive depression screen.  Today's Plan: continue current medications  o Depression Screen completed and scanned into the EMR under the designated folder within the patient's documents.  o Audit-C Questionnaire completed and scanned into the EMR under the designated folder within the patient's documents.  o Audit-C score of 0-4 - Negative Screen - Brief Discussion            Electronically Signed by: HORTENSIA Schmitt -Author on May 17, 2021 11:12:41 AM

## 2021-06-05 NOTE — PROGRESS NOTES
Progress Note      Patient Name: Samina Marin   Patient ID: [id number]   Sex: Female   YOB: 1950    Primary Care Provider: Jonelle BAZAN   Referring Provider: Jonelle BAZAN    Visit Date: May 17, 2021    Provider: HORTENSIA Schmitt   [facility]   Location Address: [address] [place] [address], [address]  [place], [place]  [id number]   Location Phone: [phone]          Chief Complaint  · hip pain      History Of Present Illness  Samina Marin is a 70 year old /White female who presents for evaluation and treatment of:      Patient is here for right hip pain. She is still having issues with her right hip since February. She has had an x-ray of her hip and completed PT. She is requesting an MRI of her hip.     MAW completed in office today    Patient would like to get her A1C checked. She feels that her sugar levels have been elevated and she is getting blurry vision  at times.    Colonoscopy: in chart  Pneumonia vaccines: [facility]       Past Medical History  Disease Name Date Onset Notes   Allergic rhinitis, chronic --  --    Arthritis --  --    Asthma --  --    Bone Density Screening 07/11/2019 osteopenia   Carpal tunnel syndrome, bilateral --  --    Cervicalgia --  --    DDD (degenerative disc disease), lumbar --  --    Fibromyalgia 03/05/2018 --    GERD --  --    Hyperlipidemia --  --    Low back pain --  --    Major depressive disorder 03/05/2018 --    Mood disorder --  --    Obesity --  --    Postmenopausal --  --    Screening Mammogram 07/11/2019 normal   Skin cancer --  Hx of skin cancer- JERONIMO Guzman on 3/11/16 Office note   Urge and stress incontinence --  --    Vitamin D Deficiency --  --          Past Surgical History  Procedure Name Date Notes   Back 1998 Orthopedic surgery s/p lumbar discectomy   Cardiac --  --    Cardiac Ablasion 2015  in [place]   Colonoscopy 7/18/2017 Normal-Dr. Pantoja   Hand surgery --  --     Hysterectomy 1973 --    shoulder repair 1985 Rt Shoulder         Medication List  Name Date Started Instructions   amlodipine 5 mg oral tablet  take 1 tablet (5 mg) by oral route once daily   aspirin 81 mg oral tablet,delayed release (DR/EC) 03/05/2018 take 1 tablet (81 mg) by oral route once daily   duloxetine 60 mg oral capsule,delayed release(DR/EC) 03/10/2020 TAKE 1 CAPSULE DAILY (NEED APPOINTMENT FOR REFILLS)   metoprolol tartrate 25 mg oral tablet  take 1 tablet (25 mg) by oral route once daily   Nexium 20 mg oral capsule,delayed release(DR/EC)  1 PO 1-2 times daily as needed   omeprazole 40 mg oral capsule,delayed release(DR/EC)  take 1 capsule (40 mg) by oral route once daily before a meal   tolterodine 2 mg oral tablet 03/10/2020 TAKE 1 TABLET (2 MG) BY ORAL ROUTE 2 TIMES PER DAY FOR 90 DAYS         Allergy List  Allergen Name Date Reaction Notes   Demerol --  --  --          Family Medical History  Disease Name Relative/Age Notes   Pancreatic Neoplasm, Malignant Sister/   --    Heart Disease Sister/   --    Cancer, Unspecified Father/  Mother/  Sister/   --    Breast Neoplasm Aunt/   --    Colon Cancer Uncle/   --    Lung cancer Father/  Mother/  Uncle/   --    Ovarian Cancer, Family History Sister/   --          Reproductive History  Menstrual   Age Menarche: 12   Pregnancy Summary   Total Pregnancies: 3 Full Term: 3 Premature: 0   Ab Induced: 0 Ab Spontaneous: 0 Ectopics: 0   Multiples: 0 Living: 3         Social History  Finding Status Start/Stop Quantity Notes   Alcohol Never 0/0 --  --    Caffeine Current some day 0/0 --  drinks occasionally; tea   Second hand smoke exposure Never 0/0 --  no   Tobacco Never 0/0 --  never a smoker    --  --/-- --  --          Immunizations  NameDate Admin Mfg Trade Name Lot Number Route Inj VIS Given VIS Publication   InfluenzaRefused 04/22/2019 NE Not Entered  NE NE     Comments: Has never taken a flu shot         Review of  "Systems  · Constitutional  o Denies  o : fever, fatigue, weight loss, weight gain  · Cardiovascular  o Denies  o : lower extremity edema, claudication, chest pressure, palpitations  · Respiratory  o Denies  o : shortness of breath, wheezing, cough, hemoptysis, dyspnea on exertion  · Gastrointestinal  o Denies  o : nausea, vomiting, diarrhea, constipation, abdominal pain  · Musculoskeletal  o Admits  o : hip pain on the right      Vitals  Date Time BP Position Site L\R Cuff Size HR RR TEMP (F) WT  HT  BMI kg/m2 BSA m2 O2 Sat FR L/min FiO2        05/17/2021 10:37 /67 Sitting    86 - R   235lbs 6oz 5'  6\" 37.99 2.23 96 %            Physical Examination  · Constitutional  o Appearance  o : well-nourished, well developed, alert, in no acute distress  · Respiratory  o Respiratory Effort  o : breathing unlabored  o Auscultation of Lungs  o : normal breath sounds throughout  · Cardiovascular  o Heart  o :   § Auscultation of Heart  § : regular rate and rhythm, no murmurs, gallops or rubs  § Palpation of Heart  § : normal apical impulse, no cardiac thrill present  o Peripheral Vascular System  o :   § Extremities  § : no cyanosis, clubbing or edema; less than 2 second refill noted  · Gastrointestinal  o Abdominal Examination  o : abdomen nontender to palpation, tone normal without rigidity or guarding, no masses present, abdominal contour scaphoid  o Liver and spleen  o : no hepatomegaly present, liver nontender to palpation, spleen not palpable  · Musculoskeletal  o Right Lower Extremity  o :   § Inspection/Palpation  § : no joint or limb tenderness to palpation  § Range of Motion  § : range of motion normal, no joint crepitations present, pain in right hip with ROM  o Left Lower Extremity  o :   § Inspection/Palpation  § : no joint or limb tenderness to palpation  § Range of Motion  § : range of motion normal, no joint crepitations present, no pain on motion  · Skin and Subcutaneous Tissue  o General " Inspection  o : no rashes or lesions present, no areas of discoloration  · Neurologic  o Mental Status Examination  o :   § Orientation  § : grossly oriented to person, place and time  o Cranial Nerves  o : cranial nerves intact and symmetric throughout  · Psychiatric  o Mood and Affect  o : mood normal, affect appropriate, denies any SI/HI          Assessment  · Hyperlipidemia     272.4/E78.5  · Vitamin D deficiency     268.9/E55.9  · Fibromyalgia     729.1/M79.7  · Allergic rhinitis, chronic     477.9  · Arthritis     716.90/M19.90  · GERD     530.81  · Right hip pain     719.45/M25.551  · Hyperglycemia     790.29/R73.9      Plan  · Orders  o Hgb A1c [facility] (83114) - 790.29/R73.9 - 05/17/2021  o ACO-39: Current medications updated and reviewed (1159F, ) - - 05/17/2021  o MRI hip right without contrast [facility] (82024-LN) - - 05/17/2021  · Medications  o Medications have been Reconciled  o Transition of Care or Provider Policy  · Instructions  o Advised that cheeses and other sources of dairy fats, animal fats, fast food, and the extras (candy, pastries, pies, doughnuts and cookies) all contain LDL raising nutrients. Advised to increase fruits, vegetables, whole grains, and to monitor portion sizes.   o Patient was educated/instructed on their diagnosis, treatment and medications prior to discharge from the clinic today.  o Patient instructed to seek medical attention urgently for new or worsening symptoms.  o Call the office with any concerns or questions.  · Disposition  o Call or Return if symptoms worsen or persist.  o Return Visit Request in/on 6 months +/- 2 weeks (756[address]).            Electronically Signed by: HORTENSIA Schmitt -Author on May 17, 2021 10:55:11 AM

## 2021-06-14 ENCOUNTER — TELEPHONE (OUTPATIENT)
Dept: FAMILY MEDICINE CLINIC | Facility: CLINIC | Age: 71
End: 2021-06-14

## 2021-06-14 DIAGNOSIS — M25.551 HIP PAIN, RIGHT: Primary | ICD-10-CM

## 2021-06-14 NOTE — TELEPHONE ENCOUNTER
Caller: Samina Marin    Relationship: Self    Best call back number: [phone]     PATIENT CALLED AND WOULD LIKE SOMEONE TO CALL AND MAKE HER ORTHO APPT WITH DR SANDOVAL FOR HER

## 2021-07-13 ENCOUNTER — OFFICE VISIT (OUTPATIENT)
Dept: ORTHOPEDIC SURGERY | Facility: CLINIC | Age: 71
End: 2021-07-13

## 2021-07-13 VITALS — OXYGEN SATURATION: 98 % | BODY MASS INDEX: 39.15 KG/M2 | HEIGHT: 65 IN | WEIGHT: 235 LBS | HEART RATE: 80 BPM

## 2021-07-13 DIAGNOSIS — S70.02XA CONTUSION OF LEFT HIP, INITIAL ENCOUNTER: ICD-10-CM

## 2021-07-13 DIAGNOSIS — M25.552 LEFT HIP PAIN: Primary | ICD-10-CM

## 2021-07-13 PROCEDURE — 96372 THER/PROPH/DIAG INJ SC/IM: CPT | Performed by: ORTHOPAEDIC SURGERY

## 2021-07-13 PROCEDURE — 99203 OFFICE O/P NEW LOW 30 MIN: CPT | Performed by: ORTHOPAEDIC SURGERY

## 2021-07-13 RX ORDER — DEXAMETHASONE SODIUM PHOSPHATE 4 MG/ML
8 INJECTION, SOLUTION INTRA-ARTICULAR; INTRALESIONAL; INTRAMUSCULAR; INTRAVENOUS; SOFT TISSUE ONCE
Status: COMPLETED | OUTPATIENT
Start: 2021-07-13 | End: 2021-07-13

## 2021-07-13 RX ORDER — DICLOFENAC SODIUM 75 MG/1
75 TABLET, DELAYED RELEASE ORAL 2 TIMES DAILY
Qty: 60 TABLET | Refills: 1 | Status: SHIPPED | OUTPATIENT
Start: 2021-07-13 | End: 2021-09-13

## 2021-07-13 RX ORDER — DILTIAZEM HYDROCHLORIDE 180 MG/1
180 CAPSULE, EXTENDED RELEASE ORAL DAILY
Status: ON HOLD | COMMUNITY
End: 2021-12-09

## 2021-07-13 RX ORDER — AMLODIPINE BESYLATE 5 MG/1
10 TABLET ORAL DAILY
COMMUNITY
End: 2022-05-23 | Stop reason: SDUPTHER

## 2021-07-13 RX ORDER — LANSOPRAZOLE 15 MG/1
15 CAPSULE, DELAYED RELEASE ORAL DAILY
Status: ON HOLD | COMMUNITY
End: 2021-12-09

## 2021-07-13 RX ADMIN — DEXAMETHASONE SODIUM PHOSPHATE 8 MG: 4 INJECTION, SOLUTION INTRA-ARTICULAR; INTRALESIONAL; INTRAMUSCULAR; INTRAVENOUS; SOFT TISSUE at 14:51

## 2021-07-13 NOTE — PROGRESS NOTES
"Chief Complaint  Initial Evaluation and Pain of the Right Hip     Subjective      Samina Marin presents to [facility] for an evaluation of right hip. Patient had fallen on the ice and snow in February resulting in immediate pain. She saw her PCP and obtained x-rays. There was no fracture or dislocation and so patient was referred to PT. Patient states PT didn't provide her with too much relief so PCP ordered an MRI. Patient states pain on the posterior and lateral aspect of her right hip.     Allergies   Allergen Reactions   • Meperidine Rash        Social History     Socioeconomic History   • Marital status:      Spouse name: Not on file   • Number of children: Not on file   • Years of education: Not on file   • Highest education level: Not on file   Tobacco Use   • Smoking status: Never Smoker   • Smokeless tobacco: Current User   • Tobacco comment: CAFFEINE USE   Substance and Sexual Activity   • Alcohol use: Yes     Comment: RARE        Review of Systems     Objective   Vital Signs:   Pulse 80   Ht 165.1 cm (65\")   Wt 107 kg (235 lb)   SpO2 98%   BMI 39.11 kg/m²       Physical Exam  Constitutional:       Appearance: Normal appearance. He is well-developed and normal weight.   HENT:      Head: Normocephalic.      Right Ear: Hearing and external ear normal.      Left Ear: Hearing and external ear normal.      Nose: Nose normal.   Eyes:      Conjunctiva/sclera: Conjunctivae normal.   Cardiovascular:      Rate and Rhythm: Normal rate.   Pulmonary:      Effort: Pulmonary effort is normal.      Breath sounds: No wheezing or rales.   Abdominal:      Palpations: Abdomen is soft.      Tenderness: There is no abdominal tenderness.   Musculoskeletal:      Cervical back: Normal range of motion.   Skin:     Findings: No rash.   Neurological:      Mental Status: He is alert and oriented to person, place, and time.   Psychiatric:         Mood and Affect: Mood and affect normal.         " Judgment: Judgment normal.       Ortho Exam      RIGHT HIP: Good tone of hip flexors, hip extensors, hip adductor, hip abductors. Good strength to hamstrings, quadriceps, dorsiflexors and plantar flexors. Sensation grossly intact. Tender greater trochanter. Neurovascular intact. Skin intact. Dorsal Pedal Pulse 2+, posteriror tibialis pulse 2+. Calf supple, non-tender. Full weight bearing. Antalgic gait. Full hip range of motion with no groin pain.       Procedures      Imaging Results (Most Recent)     None           Result Review :       [facility]            PACS RADIOLOGY REPORT     Patient: HEMALATHA CUELLAR     Acct: [id number]     Report: #[id number]  UNIT #: [id number]     DOS: 2021 1117     Order #: MRI [id number]  Location: MRI     : 1950  ORDERING: MICHAEL LEAL  DICTATING: Sanchez Vanegas #: [id number]     EXAM: HIPRWO - MRI RIGHT HIP WO  REASON FOR EXAM: RIGHT HIP PAIN  REASON FOR VISIT: RIGHT HIP PAIN  PROCEDURE: MRI RIGHT HIP WITHOUT CONTRAST         COMPARISON:  None    INDICATIONS: H/O FALL X 2021. POSTERIOR RIGHT HIP PAIN SINCE.         TECHNIQUE: A comprehensive examination was performed utilizing a variety of imaging planes and     imaging parameters to optimize visualization of suspected pathology.  Images were performed without     contrast.         FINDINGS:     There is no evidence for fracture or dislocation. The bilateral femoral acetabular articulations     are intact. No abnormal edema or stress related changes are identified. There is no evidence for     osseous contusion or stress injury.  There is no evidence for osseous necrosis.         Mild osteoarthritic degenerative changes of the bilateral hips are noted. There is evidence for     articular cartilage loss, joint space narrowing, subchondral edema, and osteophytosis. No     significant joint effusion is seen bilaterally.  Mild chronic degenerative type changes of the      labrum of the right hip are noted.  There is no evidence for focal labral tear or displaced labral     fragment/flap. The myotendinous insertions of the right hip are intact. No abnormal fluid     collections are seen within the soft tissues surrounding the right hip or pelvis.         No abnormal focal bone marrow signal is observed throughout the osseous pelvis. The cortical     margins are intact. No acute abnormalities are observed involving the intraperitoneal soft tissues.         CONCLUSION:     1. No evidence for fracture or dislocation. No evidence for osseous contusion or stress injury.      There is no evidence for osseous necrosis.    2. Mild osteoarthritic degenerative changes of the bilateral hips.    3. Chronic degenerative type changes of the labrum of the right hip.  No significant labral tear is     seen.      4. No evidence for myotendinous avulsion.             Assessment and Plan     DX: Hip contusion, right     Discussed treatment plans and diagnosis with the patient. Patient was given an IM injection and tolerated this procedure well.     Call or return if worsening symptoms.    Follow Up     PRN.       Patient was given instructions and counseling regarding her condition or for health maintenance advice. Please see specific information pulled into the AVS if appropriate.     Scribed for Holden Staley MD by Danii Chacon.  07/13/21   14:27 EDT    I have personally performed the services described in this document as scribed by the above individual and it is both accurate and complete.  Holden Staley MD 07/13/21  14:27 EDT

## 2021-07-15 VITALS
HEIGHT: 66 IN | OXYGEN SATURATION: 96 % | SYSTOLIC BLOOD PRESSURE: 126 MMHG | HEART RATE: 86 BPM | BODY MASS INDEX: 37.83 KG/M2 | DIASTOLIC BLOOD PRESSURE: 67 MMHG | WEIGHT: 235.37 LBS

## 2021-08-26 ENCOUNTER — TELEPHONE (OUTPATIENT)
Dept: FAMILY MEDICINE CLINIC | Facility: CLINIC | Age: 71
End: 2021-08-26

## 2021-08-26 PROBLEM — M51.369 DDD (DEGENERATIVE DISC DISEASE), LUMBAR: Status: ACTIVE | Noted: 2021-08-26

## 2021-08-26 PROBLEM — E78.2 MIXED HYPERLIPIDEMIA: Status: ACTIVE | Noted: 2021-04-28

## 2021-08-26 PROBLEM — J30.9 ALLERGIC RHINITIS: Status: ACTIVE | Noted: 2021-08-26

## 2021-08-26 PROBLEM — I35.1 AORTIC VALVE REGURGITATION: Status: ACTIVE | Noted: 2021-04-28

## 2021-08-26 PROBLEM — M19.90 ARTHRITIS: Status: ACTIVE | Noted: 2021-08-26

## 2021-08-26 PROBLEM — E66.9 OBESITY: Status: ACTIVE | Noted: 2021-08-26

## 2021-08-26 PROBLEM — J45.909 ASTHMA: Status: ACTIVE | Noted: 2021-08-26

## 2021-08-26 PROBLEM — E55.9 VITAMIN D DEFICIENCY: Status: ACTIVE | Noted: 2021-08-26

## 2021-08-26 PROBLEM — N39.46 MIXED STRESS AND URGE URINARY INCONTINENCE: Status: ACTIVE | Noted: 2021-08-26

## 2021-08-26 PROBLEM — I47.1 PAROXYSMAL SUPRAVENTRICULAR TACHYCARDIA (HCC): Status: ACTIVE | Noted: 2021-04-28

## 2021-08-26 PROBLEM — M51.36 DDD (DEGENERATIVE DISC DISEASE), LUMBAR: Status: ACTIVE | Noted: 2021-08-26

## 2021-08-26 PROBLEM — I10 ESSENTIAL HYPERTENSION: Status: ACTIVE | Noted: 2021-04-28

## 2021-08-26 PROBLEM — G56.03 CARPAL TUNNEL SYNDROME, BILATERAL: Status: ACTIVE | Noted: 2021-08-26

## 2021-08-26 PROBLEM — M79.7 FIBROMYALGIA: Status: ACTIVE | Noted: 2018-03-05

## 2021-08-26 PROBLEM — K21.9 ESOPHAGEAL REFLUX: Status: ACTIVE | Noted: 2021-08-26

## 2021-08-26 PROBLEM — F39 MOOD DISORDER: Status: ACTIVE | Noted: 2021-08-26

## 2021-08-26 PROBLEM — I47.10 PAROXYSMAL SUPRAVENTRICULAR TACHYCARDIA: Status: ACTIVE | Noted: 2021-04-28

## 2021-08-26 PROBLEM — C44.90 SKIN CANCER: Status: ACTIVE | Noted: 2021-08-26

## 2021-08-26 PROBLEM — Z78.0 POSTMENOPAUSAL: Status: ACTIVE | Noted: 2021-08-26

## 2021-08-26 PROBLEM — F32.9 MAJOR DEPRESSIVE DISORDER: Status: ACTIVE | Noted: 2018-03-05

## 2021-08-26 RX ORDER — TOLTERODINE TARTRATE 2 MG/1
2 TABLET, EXTENDED RELEASE ORAL 2 TIMES DAILY
Qty: 180 TABLET | Refills: 0 | Status: SHIPPED | OUTPATIENT
Start: 2021-08-26 | End: 2021-11-24

## 2021-08-26 RX ORDER — ALENDRONATE SODIUM 70 MG/1
70 TABLET ORAL
Qty: 12 TABLET | Refills: 0 | Status: ON HOLD | OUTPATIENT
Start: 2021-08-26 | End: 2021-12-09

## 2021-08-26 RX ORDER — ALENDRONATE SODIUM 70 MG/1
70 TABLET ORAL
COMMUNITY
End: 2021-08-26 | Stop reason: SDUPTHER

## 2021-08-26 RX ORDER — DULOXETIN HYDROCHLORIDE 60 MG/1
60 CAPSULE, DELAYED RELEASE ORAL DAILY
Qty: 90 CAPSULE | Refills: 0 | Status: SHIPPED | OUTPATIENT
Start: 2021-08-26 | End: 2021-11-24

## 2021-08-26 RX ORDER — TOLTERODINE TARTRATE 2 MG/1
2 TABLET, EXTENDED RELEASE ORAL 2 TIMES DAILY
COMMUNITY
End: 2021-08-26 | Stop reason: SDUPTHER

## 2021-09-12 DIAGNOSIS — M25.552 LEFT HIP PAIN: ICD-10-CM

## 2021-09-12 RX ORDER — DICLOFENAC SODIUM 75 MG/1
TABLET, DELAYED RELEASE ORAL
Qty: 60 TABLET | Refills: 1 | Status: CANCELLED | OUTPATIENT
Start: 2021-09-12

## 2021-09-13 RX ORDER — DICLOFENAC SODIUM 75 MG/1
TABLET, DELAYED RELEASE ORAL
Qty: 60 TABLET | Refills: 1 | Status: ON HOLD | OUTPATIENT
Start: 2021-09-13 | End: 2021-12-09

## 2021-11-24 ENCOUNTER — TRANSCRIBE ORDERS (OUTPATIENT)
Dept: CARDIOLOGY | Facility: CLINIC | Age: 71
End: 2021-11-24

## 2021-11-24 DIAGNOSIS — N32.81 OVERACTIVE BLADDER: Primary | ICD-10-CM

## 2021-11-24 DIAGNOSIS — I20.9 ANGINA PECTORIS (HCC): Primary | ICD-10-CM

## 2021-11-24 DIAGNOSIS — F32.A DEPRESSION, UNSPECIFIED DEPRESSION TYPE: ICD-10-CM

## 2021-11-24 DIAGNOSIS — I10 ESSENTIAL HYPERTENSION: ICD-10-CM

## 2021-11-24 RX ORDER — DULOXETIN HYDROCHLORIDE 60 MG/1
CAPSULE, DELAYED RELEASE ORAL
Qty: 90 CAPSULE | Refills: 0 | Status: SHIPPED | OUTPATIENT
Start: 2021-11-24 | End: 2022-02-22

## 2021-11-24 RX ORDER — TOLTERODINE TARTRATE 2 MG/1
TABLET, EXTENDED RELEASE ORAL
Qty: 180 TABLET | Refills: 0 | Status: SHIPPED | OUTPATIENT
Start: 2021-11-24 | End: 2022-02-22

## 2021-12-01 ENCOUNTER — TRANSCRIBE ORDERS (OUTPATIENT)
Dept: CARDIOLOGY | Facility: CLINIC | Age: 71
End: 2021-12-01

## 2021-12-01 DIAGNOSIS — Z01.810 PRE-OPERATIVE CARDIOVASCULAR EXAMINATION: Primary | ICD-10-CM

## 2021-12-01 DIAGNOSIS — Z01.818 OTHER SPECIFIED PRE-OPERATIVE EXAMINATION: ICD-10-CM

## 2021-12-01 DIAGNOSIS — Z13.6 SCREENING FOR ISCHEMIC HEART DISEASE: ICD-10-CM

## 2021-12-02 PROBLEM — I20.9 ANGINA PECTORIS (HCC): Status: ACTIVE | Noted: 2021-12-02

## 2021-12-07 ENCOUNTER — LAB (OUTPATIENT)
Dept: LAB | Facility: HOSPITAL | Age: 71
End: 2021-12-07

## 2021-12-07 DIAGNOSIS — Z13.6 SCREENING FOR ISCHEMIC HEART DISEASE: ICD-10-CM

## 2021-12-07 DIAGNOSIS — Z01.810 PRE-OPERATIVE CARDIOVASCULAR EXAMINATION: ICD-10-CM

## 2021-12-07 DIAGNOSIS — Z01.818 OTHER SPECIFIED PRE-OPERATIVE EXAMINATION: ICD-10-CM

## 2021-12-07 LAB
ANION GAP SERPL CALCULATED.3IONS-SCNC: 9.2 MMOL/L (ref 5–15)
BASOPHILS # BLD AUTO: 0.04 10*3/MM3 (ref 0–0.2)
BASOPHILS NFR BLD AUTO: 0.8 % (ref 0–1.5)
BUN SERPL-MCNC: 10 MG/DL (ref 8–23)
BUN/CREAT SERPL: 16.1 (ref 7–25)
CALCIUM SPEC-SCNC: 9.2 MG/DL (ref 8.6–10.5)
CHLORIDE SERPL-SCNC: 102 MMOL/L (ref 98–107)
CO2 SERPL-SCNC: 25.8 MMOL/L (ref 22–29)
CREAT SERPL-MCNC: 0.62 MG/DL (ref 0.57–1)
DEPRECATED RDW RBC AUTO: 41.2 FL (ref 37–54)
EOSINOPHIL # BLD AUTO: 0.18 10*3/MM3 (ref 0–0.4)
EOSINOPHIL NFR BLD AUTO: 3.4 % (ref 0.3–6.2)
ERYTHROCYTE [DISTWIDTH] IN BLOOD BY AUTOMATED COUNT: 13.3 % (ref 12.3–15.4)
GFR SERPL CREATININE-BSD FRML MDRD: 95 ML/MIN/1.73
GLUCOSE SERPL-MCNC: 138 MG/DL (ref 65–99)
HCT VFR BLD AUTO: 41.6 % (ref 34–46.6)
HGB BLD-MCNC: 13.8 G/DL (ref 12–15.9)
IMM GRANULOCYTES # BLD AUTO: 0.01 10*3/MM3 (ref 0–0.05)
IMM GRANULOCYTES NFR BLD AUTO: 0.2 % (ref 0–0.5)
LYMPHOCYTES # BLD AUTO: 1.65 10*3/MM3 (ref 0.7–3.1)
LYMPHOCYTES NFR BLD AUTO: 31 % (ref 19.6–45.3)
MCH RBC QN AUTO: 28.5 PG (ref 26.6–33)
MCHC RBC AUTO-ENTMCNC: 33.2 G/DL (ref 31.5–35.7)
MCV RBC AUTO: 86 FL (ref 79–97)
MONOCYTES # BLD AUTO: 0.36 10*3/MM3 (ref 0.1–0.9)
MONOCYTES NFR BLD AUTO: 6.8 % (ref 5–12)
NEUTROPHILS NFR BLD AUTO: 3.08 10*3/MM3 (ref 1.7–7)
NEUTROPHILS NFR BLD AUTO: 57.8 % (ref 42.7–76)
NRBC BLD AUTO-RTO: 0 /100 WBC (ref 0–0.2)
PLATELET # BLD AUTO: 202 10*3/MM3 (ref 140–450)
PMV BLD AUTO: 10.5 FL (ref 6–12)
POTASSIUM SERPL-SCNC: 4.2 MMOL/L (ref 3.5–5.2)
RBC # BLD AUTO: 4.84 10*6/MM3 (ref 3.77–5.28)
SODIUM SERPL-SCNC: 137 MMOL/L (ref 136–145)
WBC NRBC COR # BLD: 5.32 10*3/MM3 (ref 3.4–10.8)

## 2021-12-07 PROCEDURE — 87635 SARS-COV-2 COVID-19 AMP PRB: CPT

## 2021-12-07 PROCEDURE — 85025 COMPLETE CBC W/AUTO DIFF WBC: CPT

## 2021-12-07 PROCEDURE — C9803 HOPD COVID-19 SPEC COLLECT: HCPCS

## 2021-12-07 PROCEDURE — 80048 BASIC METABOLIC PNL TOTAL CA: CPT

## 2021-12-07 PROCEDURE — 36415 COLL VENOUS BLD VENIPUNCTURE: CPT

## 2021-12-08 LAB — SARS-COV-2 N GENE RESP QL NAA+PROBE: NOT DETECTED

## 2021-12-09 ENCOUNTER — HOSPITAL ENCOUNTER (OUTPATIENT)
Facility: HOSPITAL | Age: 71
Setting detail: HOSPITAL OUTPATIENT SURGERY
Discharge: HOME | End: 2021-12-09
Attending: INTERNAL MEDICINE | Admitting: INTERNAL MEDICINE

## 2021-12-09 VITALS
TEMPERATURE: 98.4 F | RESPIRATION RATE: 16 BRPM | WEIGHT: 230 LBS | HEART RATE: 76 BPM | DIASTOLIC BLOOD PRESSURE: 64 MMHG | HEIGHT: 65 IN | OXYGEN SATURATION: 91 % | SYSTOLIC BLOOD PRESSURE: 99 MMHG | BODY MASS INDEX: 38.32 KG/M2

## 2021-12-09 DIAGNOSIS — I20.9 ANGINA PECTORIS (HCC): ICD-10-CM

## 2021-12-09 PROCEDURE — C1769 GUIDE WIRE: HCPCS | Performed by: INTERNAL MEDICINE

## 2021-12-09 PROCEDURE — 25010000002 HEPARIN (PORCINE) PER 1000 UNITS: Performed by: INTERNAL MEDICINE

## 2021-12-09 PROCEDURE — C1894 INTRO/SHEATH, NON-LASER: HCPCS | Performed by: INTERNAL MEDICINE

## 2021-12-09 PROCEDURE — 0 IOPAMIDOL PER 1 ML: Performed by: INTERNAL MEDICINE

## 2021-12-09 PROCEDURE — 93458 L HRT ARTERY/VENTRICLE ANGIO: CPT | Performed by: INTERNAL MEDICINE

## 2021-12-09 PROCEDURE — S0260 H&P FOR SURGERY: HCPCS | Performed by: INTERNAL MEDICINE

## 2021-12-09 PROCEDURE — 25010000002 FENTANYL CITRATE (PF) 50 MCG/ML SOLUTION: Performed by: INTERNAL MEDICINE

## 2021-12-09 PROCEDURE — 25010000002 MIDAZOLAM PER 1 MG: Performed by: INTERNAL MEDICINE

## 2021-12-09 PROCEDURE — 99152 MOD SED SAME PHYS/QHP 5/>YRS: CPT | Performed by: INTERNAL MEDICINE

## 2021-12-09 RX ORDER — LIDOCAINE HYDROCHLORIDE 20 MG/ML
INJECTION, SOLUTION INFILTRATION; PERINEURAL AS NEEDED
Status: DISCONTINUED | OUTPATIENT
Start: 2021-12-09 | End: 2021-12-09 | Stop reason: HOSPADM

## 2021-12-09 RX ORDER — SODIUM CHLORIDE 0.9 % (FLUSH) 0.9 %
10 SYRINGE (ML) INJECTION AS NEEDED
Status: DISCONTINUED | OUTPATIENT
Start: 2021-12-09 | End: 2021-12-09 | Stop reason: HOSPADM

## 2021-12-09 RX ORDER — SODIUM CHLORIDE 0.9 % (FLUSH) 0.9 %
3 SYRINGE (ML) INJECTION EVERY 12 HOURS SCHEDULED
Status: DISCONTINUED | OUTPATIENT
Start: 2021-12-09 | End: 2021-12-09 | Stop reason: HOSPADM

## 2021-12-09 RX ORDER — MIDAZOLAM HYDROCHLORIDE 1 MG/ML
INJECTION INTRAMUSCULAR; INTRAVENOUS AS NEEDED
Status: DISCONTINUED | OUTPATIENT
Start: 2021-12-09 | End: 2021-12-09 | Stop reason: HOSPADM

## 2021-12-09 RX ORDER — CETIRIZINE HYDROCHLORIDE 10 MG/1
10 TABLET ORAL DAILY
COMMUNITY

## 2021-12-09 RX ORDER — SODIUM CHLORIDE 9 MG/ML
75 INJECTION, SOLUTION INTRAVENOUS CONTINUOUS
Status: DISCONTINUED | OUTPATIENT
Start: 2021-12-09 | End: 2021-12-09 | Stop reason: HOSPADM

## 2021-12-09 RX ORDER — SODIUM CHLORIDE 0.9 % (FLUSH) 0.9 %
10 SYRINGE (ML) INJECTION EVERY 12 HOURS SCHEDULED
Status: DISCONTINUED | OUTPATIENT
Start: 2021-12-09 | End: 2021-12-09 | Stop reason: HOSPADM

## 2021-12-09 RX ORDER — ACETAMINOPHEN 325 MG/1
650 TABLET ORAL EVERY 4 HOURS PRN
Status: DISCONTINUED | OUTPATIENT
Start: 2021-12-09 | End: 2021-12-09 | Stop reason: HOSPADM

## 2021-12-09 RX ORDER — ASPIRIN 81 MG/1
81 TABLET ORAL DAILY
COMMUNITY

## 2021-12-09 RX ORDER — FENTANYL CITRATE 50 UG/ML
INJECTION, SOLUTION INTRAMUSCULAR; INTRAVENOUS AS NEEDED
Status: DISCONTINUED | OUTPATIENT
Start: 2021-12-09 | End: 2021-12-09 | Stop reason: HOSPADM

## 2021-12-09 RX ORDER — LIDOCAINE HYDROCHLORIDE 10 MG/ML
0.1 INJECTION, SOLUTION EPIDURAL; INFILTRATION; INTRACAUDAL; PERINEURAL ONCE AS NEEDED
Status: DISCONTINUED | OUTPATIENT
Start: 2021-12-09 | End: 2021-12-09 | Stop reason: HOSPADM

## 2021-12-09 RX ADMIN — SODIUM CHLORIDE 75 ML/HR: 9 INJECTION, SOLUTION INTRAVENOUS at 11:04

## 2021-12-09 NOTE — H&P
[facility] History And Physical Assessment    Patient Name: Samina Marin  Age/Sex: 71 y.o. female  : 1950  MRN: [id number]    Date of Hospital Admission: 2021  Date of Encounter Visit: 21  Encounter Provider: Marily Maya MD  Place of Service: [facility] CARDIOLOGY  Patient Care Team:  Jonelle Joseph APRN as PCP - General (Nurse Practitioner)    Subjective:     Chief Complaint: Chest pain    History of Present Illness:  Samina Marin is a 71 y.o. female patient of Dr. Enrique at the [facility] clinic with hypertension, hyperlipidemia, supraventricular tachycardia status post prior ablation, who presents for cardiac catheterization.    The patient was seen by Dr. Enrique in 2021.  At that time she reported an episode of chest discomfort.  She also reported some lower extremity edema.  He he was concerned that the symptoms represented angina so he set her up for a cardiac CTA.  Unfortunately her heart rate was not low enough for an adequate study and the test was nondiagnostic.  Since then she has had an additional episode of chest discomfort.  She also reports remittent shortness of breath and fatigue with routine activities such as just walking to the bathroom.  Due to her ongoing symptoms is recommended that she undergo a cardiac catheterization.    In the past she underwent both a stress test and an echocardiogram in 2018.  Stress test showed small to medium sized infarct in the inferior wall and apex with normal wall motion.  Echocardiogram at that time showed mild aortic regurgitation but otherwise normal left ventricular function.    Past Medical History:  Past Medical History:   Diagnosis Date   • Allergic rhinitis    • Chronic bronchitis (HCC)    • Perianal abscess    • Sinus bradycardia        Past Surgical History:   Procedure Laterality Date   • BACK SURGERY     • CARDIAC ELECTROPHYSIOLOGY STUDY AND ABLATION     • HYSTERECTOMY     •  ROTATOR CUFF REPAIR Right    • TENDON REPAIR Right     SHOULDER       Home Medications:   Medications Prior to Admission   Medication Sig Dispense Refill Last Dose   • amLODIPine (NORVASC) 5 MG tablet Take 10 mg by mouth Daily.   12/9/2021 at Unknown time   • aspirin 81 MG EC tablet Take 81 mg by mouth Daily.   12/9/2021 at Unknown time   • cetirizine (zyrTEC) 10 MG tablet Take 10 mg by mouth Daily.   12/9/2021 at Unknown time   • DULoxetine (CYMBALTA) 60 MG capsule TAKE 1 CAPSULE DAILY 90 capsule 0 12/9/2021 at Unknown time   • esomeprazole (NexIUM) 40 MG capsule Take  by mouth daily.   12/9/2021 at Unknown time   • metoprolol tartrate (LOPRESSOR) 25 MG tablet TAKE 1 TABLET TWICE A  tablet 0 12/9/2021 at Unknown time   • tolterodine (DETROL) 2 MG tablet TAKE 1 TABLET TWICE A  tablet 0 12/9/2021 at Unknown time       Allergies:  Allergies   Allergen Reactions   • Meperidine Rash       Past Social History:  Social History     Socioeconomic History   • Marital status:    Tobacco Use   • Smoking status: Never Smoker   • Smokeless tobacco: Current User   • Tobacco comment: CAFFEINE USE   Substance and Sexual Activity   • Alcohol use: Yes     Comment: RARE   • Drug use: Never   • Sexual activity: Defer       Past Family History:  Family History   Problem Relation Age of Onset   • Heart disease Sister    • Ovarian cancer Sister    • Liver cancer Sister    • Heart disease Brother    • Hypertension Sister    • Pancreatic cancer Sister        Review of Systems:   All systems reviewed. Pertinent positives identified in HPI. All other systems are negative.    Objective:   Temp:  [98.4 °F (36.9 °C)] 98.4 °F (36.9 °C)  Heart Rate:  [78] 78  Resp:  [20] 20  BP: (151)/(73) 151/73  No intake or output data in the 24 hours ending 12/09/21 1157  Body mass index is 38.27 kg/m².      12/09/21  1050   Weight: 104 kg (230 lb)     Weight change:     Physical Exam:   General Appearance:    Alert, cooperative, in no  acute distress   Head:    Normocephalic, without obvious abnormality, atraumatic   Eyes:            Lids and lashes normal, conjunctivae and sclerae normal, no   icterus, no pallor, corneas clear, PERRLA   Ears:    Ears appear intact with no abnormalities noted   Neck:   No adenopathy, supple, trachea midline, no thyromegaly, no   carotid bruit, no JVD   Lungs:     Clear to auscultation,respirations regular, even and unlabored    Heart:    Regular rhythm and normal rate, normal S1 and S2, no murmur, no gallop, no rub, no click   Chest Wall:    No abnormalities observed   Abdomen:     Normal bowel sounds, no masses, no organomegaly, soft        non-tender, non-distended, no guarding, no rebound  tenderness   Extremities:   Moves all extremities well, no edema, no cyanosis, no redness   Pulses:   Pulses palpable and equal bilaterally. Normal radial, carotid, femoral, dorsalis pedis and posterior tibial pulses bilaterally. Normal abdominal aorta   Skin:  Psychiatric:   No bleeding, bruising or rash    Alert and oriented x 3, normal mood and affect         Lab Review:   Results from last 7 days   Lab Units 12/07/21  1118   SODIUM mmol/L 137   POTASSIUM mmol/L 4.2   CHLORIDE mmol/L 102   CO2 mmol/L 25.8   BUN mg/dL 10   CREATININE mg/dL 0.62   GLUCOSE mg/dL 138*   CALCIUM mg/dL 9.2         Results from last 7 days   Lab Units 12/07/21  1118   WBC 10*3/mm3 5.32   HEMOGLOBIN g/dL 13.8   HEMATOCRIT % 41.6   PLATELETS 10*3/mm3 202       Imaging:  Imaging Results (Most Recent)     None        I personally viewed and interpreted the patient's EKG    Assessment/Plan:     1.  Chest pain.  Typical and atypical features.  Since she is last saw Dr. Enrique in the office she has had a mild letter episode that occurred at rest.  She also reports intermittent dyspnea and fatigue with routine activities.  Cardiac CTA was nondiagnostic.  She is here today for cardiac catheterization.  2.  Hypertension  3.  Hyperlipidemia  4.  History  of supraventricular tachycardia.  Status post ablation.  5.  Aortic regurgitation.  Mild in the past.    -For cardiac catheterization today.    Marily Maya MD  12/09/21  11:57 EST

## 2021-12-09 NOTE — DISCHARGE INSTRUCTIONS
[facility]  [address] [place] [address]    Coronary Angiogram (Radial/Ulnar Approach) After Care    Refer to this sheet in the next few weeks. These instructions provide you with information on caring for yourself after your procedure. Your caregiver may also give you more specific instructions. Your treatment has been planned according to current medical practices, but problems sometimes occur. Call your caregiver if you have any problems or questions after your procedure.    Home Care Instructions:  · You may shower the day after the procedure. Remove the bandage (dressing) and gently wash the site with plain soap and water. Gently pat the site dry. You may apply a band aid daily for 2 days if desired.    · Do not apply powder or lotion to the site.  · Do not submerge the affected site in water for 3 to 5 days or until the site is completely healed.   · Do not lift, push or pull anything over 5 pounds for 5 days after your procedure or as directed by your physician.  As a reference, a gallon of milk weighs 8 pounds.   · Inspect the site at least twice daily. You may notice some bruising at the site and it may be tender for 1 to 2 weeks.     · Increase your fluid intake for the next 2 days.    · Keep arm elevated for 24 hours. For the remainder of the day, keep your arm in “Pledge of Allegiance” position when up and about.     · You may drive 24 hours after the procedure unless otherwise instructed by your caregiver.  · Do not operate machinery or power tools for 24 hours.  · A responsible adult should be with you for the first 24 hours after you arrive home. Do not make any important legal decisions or sign legal papers for 24 hours.  Do not drink alcohol for 24 hours.    · Metformin or any medications containing Metformin should not be taken for 48 hours after your procedure.      Call Your Doctor if:   · You have unusual pain at the radial/ulnar (wrist) site.  · You have redness, warmth,  swelling, or pain at the radial/ulnar (wrist) site.  · You have drainage (other than a small amount of blood on the dressing).  · `You have chills or a fever > 101.  · Your arm becomes pale or dark, cool, tingly, or numb.  · You develop chest pain, shortness of breath, feel faint or pass out.    · You have heavy bleeding from the site, hold pressure on the site for 20 minutes.  If the bleeding stops, apply a fresh bandage and call your cardiologist.  However, if you        continue to have bleeding, call 911 and continue to apply pressure to the site.   · You have any symptoms of a stroke.  Remember BE FAST  · B-balance. Sudden trouble walking or loss of balance.  · E-eyes.  Sudden changes in how you see or a sudden onset of a very bad headache.   · F-face. Sudden weakness or loss of feeling of the face or facial droop on one side.   · A-arms Sudden weakness or numbness in one arm.  One arm drifts down if they are both held out in front of you. This happens suddenly and usually on one side of the body.   · S-speech.  Sudden trouble speaking, slurred speech or trouble understanding what are saying.   · T-time  Time to call emergency services.  Write down the symptoms and the time they started.

## 2021-12-10 ENCOUNTER — HOSPITAL ENCOUNTER (EMERGENCY)
Facility: HOSPITAL | Age: 71
Discharge: HOME | End: 2021-12-10
Attending: EMERGENCY MEDICINE | Admitting: EMERGENCY MEDICINE

## 2021-12-10 ENCOUNTER — APPOINTMENT (OUTPATIENT)
Dept: CARDIOLOGY | Facility: HOSPITAL | Age: 71
End: 2021-12-10

## 2021-12-10 VITALS
TEMPERATURE: 98.6 F | SYSTOLIC BLOOD PRESSURE: 115 MMHG | HEART RATE: 72 BPM | BODY MASS INDEX: 38.86 KG/M2 | DIASTOLIC BLOOD PRESSURE: 72 MMHG | WEIGHT: 233.25 LBS | HEIGHT: 65 IN | RESPIRATION RATE: 18 BRPM | OXYGEN SATURATION: 91 %

## 2021-12-10 DIAGNOSIS — R60.9 POSTOPERATIVE EDEMA: Primary | ICD-10-CM

## 2021-12-10 LAB
BH CV UPPER DUPLEX RIGHT BRACHIA ART EDV: 0 CM/S
BH CV UPPER DUPLEX RIGHT BRACHIAL ART PSV: 52 CM/S
BH CV UPPER DUPLEX RIGHT RADIAL ART EDV: 0 CM/S
BH CV UPPER DUPLEX RIGHT RADIAL ART PSV: 32 CM/S
BH CV UPPER DUPLEX RIGHT ULNAR ART EDV: 0.3 CM/S
BH CV UPPER DUPLEX RIGHT ULNAR ART PSV: 40 CM/S
MAXIMAL PREDICTED HEART RATE: 149 BPM
STRESS TARGET HR: 127 BPM

## 2021-12-10 PROCEDURE — 93931 UPPER EXTREMITY STUDY: CPT

## 2021-12-10 PROCEDURE — 93931 UPPER EXTREMITY STUDY: CPT | Performed by: SURGERY

## 2021-12-10 PROCEDURE — 99283 EMERGENCY DEPT VISIT LOW MDM: CPT

## 2021-12-10 NOTE — DISCHARGE INSTRUCTIONS
Return to the emergency department as needed for worsening.  Specifically, if your right hand becomes blue or cold to touch return immediately.

## 2021-12-10 NOTE — ED PROVIDER NOTES
"Time: 9:28 AM EST  Arrived by: Private car  Chief Complaint:   Chief Complaint   Patient presents with   • Upper Extremity Issue     History provided by: Patient  History is limited by: N/A     History of Present Illness:    Samina Marin is a 71 y.o. female who presents to the emergency department today with complaints of a moderate and constant upper extremity issue. The patient reports that she had a heart cath yesterday through the right wrist at [facility]. She states that there were no significant findings noted with the procedure.    Today, she states that her right wrist is swollen. She denies any other symptoms including fever, vomiting, or diarrhea. She does note occasional swelling in her legs at baseline when she is \"working too much\" but denies significant swelling to her lower extremities today.    The patient denies smoking or drug use but does drink rarely. There are no other acute complaints at this time.      History provided by:  Patient   used: No    Upper Extremity Issue  Location:  Wrist  Wrist location:  R wrist  Upper extremity injury: Heart cath yesterday.    Pain details:     Quality: Swelling.    Radiates to:  Does not radiate    Severity:  Moderate    Onset quality:  Gradual    Duration:  1 day    Timing:  Constant    Progression:  Unchanged  Dislocation: no    Tetanus status:  Unknown  Relieved by:  None tried  Worsened by:  Nothing  Ineffective treatments:  None tried  Associated symptoms: no back pain, no fever and no neck pain    Risk factors: no concern for non-accidental trauma and no recent illness        Similar Symptoms Previously: No.  Recently seen: Patient was seen yesterday for a heart cath with Dr. Maya.      Patient Care Team  Primary Care Provider: Jonelle Joseph APRN    Past Medical History:     Allergies   Allergen Reactions   • Meperidine Rash     Past Medical History:   Diagnosis Date   • Allergic rhinitis    • Chronic bronchitis " (HCC)    • Perianal abscess    • Sinus bradycardia      Past Surgical History:   Procedure Laterality Date   • BACK SURGERY     • CARDIAC CATHETERIZATION N/A 12/9/2021    Procedure: Left Heart Cath;  Surgeon: Marily Maya MD;  Location:  [facility];  Service: Cardiology;  Laterality: N/A;   • CARDIAC CATHETERIZATION N/A 12/9/2021    Procedure: Coronary angiography;  Surgeon: Marily Maya MD;  Location:  [facility];  Service: Cardiology;  Laterality: N/A;   • CARDIAC CATHETERIZATION N/A 12/9/2021    Procedure: Left ventriculography;  Surgeon: Marily Maya MD;  Location:  [facility];  Service: Cardiology;  Laterality: N/A;   • CARDIAC ELECTROPHYSIOLOGY STUDY AND ABLATION     • HYSTERECTOMY     • ROTATOR CUFF REPAIR Right    • TENDON REPAIR Right     SHOULDER     Family History   Problem Relation Age of Onset   • Heart disease Sister    • Ovarian cancer Sister    • Liver cancer Sister    • Heart disease Brother    • Hypertension Sister    • Pancreatic cancer Sister        Home Medications:  Prior to Admission medications    Medication Sig Start Date End Date Taking? Authorizing Provider   amLODIPine (NORVASC) 5 MG tablet Take 10 mg by mouth Daily.    ProviderIoana MD   aspirin 81 MG EC tablet Take 81 mg by mouth Daily.    ProviderIoana MD   cetirizine (zyrTEC) 10 MG tablet Take 10 mg by mouth Daily.    Ioana Hinkle MD   DULoxetine (CYMBALTA) 60 MG capsule TAKE 1 CAPSULE DAILY 11/24/21   Jonelle Joseph APRN   esomeprazole (NexIUM) 40 MG capsule Take  by mouth daily. 9/3/14   Ioana Hinkle MD   metoprolol tartrate (LOPRESSOR) 25 MG tablet TAKE 1 TABLET TWICE A DAY 11/24/21   Jonelle Joseph APRN   tolterodine (DETROL) 2 MG tablet TAKE 1 TABLET TWICE A DAY 11/24/21   Jonelle Joseph APRN        Social History:   Social History     Tobacco Use   • Smoking status: Never Smoker   • Smokeless tobacco: Current User   • Tobacco  "comment: CAFFEINE USE   Substance Use Topics   • Alcohol use: Yes     Comment: RARE   • Drug use: Never     Recent travel: not applicable     Review of Systems:  Review of Systems   Constitutional: Negative for chills and fever.   HENT: Negative for nosebleeds.    Eyes: Negative for redness.   Respiratory: Negative for cough and shortness of breath.    Cardiovascular: Negative for chest pain and leg swelling (chronic and baseline with working too much; none today).   Gastrointestinal: Negative for diarrhea and vomiting.   Genitourinary: Negative for dysuria and frequency.   Musculoskeletal: Positive for joint swelling (right wrist). Negative for back pain and neck pain.   Skin: Negative for rash.   Neurological: Negative for seizures and syncope.   All other systems reviewed and are negative.       Physical Exam:  /72   Pulse 72   Temp 98.6 °F (37 °C) (Oral)   Resp 18   Ht 165.1 cm (65\")   Wt 106 kg (233 lb 4 oz)   SpO2 91%   BMI 38.81 kg/m²     Physical Exam  Vitals and nursing note reviewed.   Constitutional:       General: She is not in acute distress.  HENT:      Head: Normocephalic and atraumatic.      Nose: Nose normal.      Mouth/Throat:      Mouth: Mucous membranes are moist.   Eyes:      General: No scleral icterus.  Cardiovascular:      Rate and Rhythm: Normal rate and regular rhythm.      Pulses: Normal pulses.      Heart sounds: Normal heart sounds. No murmur heard.       Comments: Bounding 2+ radial pulse.  Pulmonary:      Effort: No respiratory distress.      Breath sounds: Normal breath sounds.   Abdominal:      Palpations: Abdomen is soft.      Tenderness: There is no abdominal tenderness.   Musculoskeletal:         General: Normal range of motion.      Cervical back: Normal range of motion and neck supple.      Right lower leg: No edema.      Left lower leg: No edema.      Comments: Right wrist has some moderate edema at the distal radial side of the wrist.   Skin:     General: Skin is " warm and dry.   Neurological:      Mental Status: She is alert. Mental status is at baseline.   Psychiatric:         Behavior: Behavior normal.                Medications in the Emergency Department:  Medications - No data to display     Labs  Labs Reviewed - No data to display     Imaging:  Duplex Upper Extremity Art / Grafts - Right CAR    Result Date: 12/10/2021  · Right radial artery demonstrates color flow patency without evidence for pseudoaneurysm or AV fistula. No stenosis evident. · Patent right brachial and ulnar and radial arteries.        Procedures:  Procedures    Progress  ED Course as of 12/10/21 1655   Fri Dec 10, 2021   1121 Vascular tech called me to alert me that there is no DVT or pseudoaneurysm.  Patient feels comfortable going home without the formal report.  I will call her back if there is any change. [RP]      ED Course User Index  [RP] Jer Ford MD                            Medical Decision Making:  MDM  Number of Diagnoses or Management Options  Postoperative edema: minor     Amount and/or Complexity of Data Reviewed  Independent visualization of images, tracings, or specimens: yes    Risk of Complications, Morbidity, and/or Mortality  Presenting problems: moderate  Management options: low    Patient Progress  Patient progress: stable       Final diagnoses:   Postoperative edema        Disposition:  ED Disposition     ED Disposition Condition Comment    Discharge Stable           Documentation assistance provided by Eufemia Galan acting as scribe for Dr. Jer Ford. Information recorded by the scribe was done at my direction and has been verified and validated by me.        Eufemia Galan  12/10/21 0933       Eufemia Galan  12/10/21 0933       Jer Ford MD  12/10/21 1658

## 2022-02-22 DIAGNOSIS — N32.81 OVERACTIVE BLADDER: ICD-10-CM

## 2022-02-22 DIAGNOSIS — F32.A DEPRESSION, UNSPECIFIED DEPRESSION TYPE: ICD-10-CM

## 2022-02-22 DIAGNOSIS — I10 ESSENTIAL HYPERTENSION: ICD-10-CM

## 2022-02-22 RX ORDER — DULOXETIN HYDROCHLORIDE 60 MG/1
CAPSULE, DELAYED RELEASE ORAL
Qty: 90 CAPSULE | Refills: 1 | Status: SHIPPED | OUTPATIENT
Start: 2022-02-22 | End: 2022-08-22

## 2022-02-22 RX ORDER — TOLTERODINE TARTRATE 2 MG/1
TABLET, EXTENDED RELEASE ORAL
Qty: 180 TABLET | Refills: 1 | Status: SHIPPED | OUTPATIENT
Start: 2022-02-22 | End: 2022-08-22

## 2022-03-21 ENCOUNTER — PATIENT OUTREACH (OUTPATIENT)
Dept: CASE MANAGEMENT | Facility: OTHER | Age: 72
End: 2022-03-21

## 2022-03-21 NOTE — OUTREACH NOTE
AMBULATORY CASE MANAGEMENT NOTE    Name and Relationship of Patient/Support Person: Samina Marin J - Self    Patient Outreach  Called patient because she was identified for proactive outreach following ED visit. Explained CCM Program and that she would qualify, but patient states that she feels that she has her health under control. She knows she needs to make an appointment with her PCP, however, made her aware that she has one scheduled for 5/23 already.   We are available to provide CCM support should patient change her mind in the future.   Education Documentation  No documentation found.      KIRSTIE ORDONEZ  Ambulatory Case Management  3/21/2022, 10:23 EDT

## 2022-05-23 ENCOUNTER — OFFICE VISIT (OUTPATIENT)
Dept: FAMILY MEDICINE CLINIC | Facility: CLINIC | Age: 72
End: 2022-05-23

## 2022-05-23 VITALS
HEART RATE: 78 BPM | HEART RATE: 78 BPM | OXYGEN SATURATION: 98 % | DIASTOLIC BLOOD PRESSURE: 49 MMHG | HEIGHT: 65 IN | HEIGHT: 65 IN | WEIGHT: 237 LBS | BODY MASS INDEX: 39.49 KG/M2 | SYSTOLIC BLOOD PRESSURE: 104 MMHG | SYSTOLIC BLOOD PRESSURE: 104 MMHG | OXYGEN SATURATION: 98 % | BODY MASS INDEX: 39.49 KG/M2 | WEIGHT: 237 LBS | DIASTOLIC BLOOD PRESSURE: 49 MMHG

## 2022-05-23 DIAGNOSIS — Z13.220 SCREENING FOR LIPID DISORDERS: ICD-10-CM

## 2022-05-23 DIAGNOSIS — F32.9 MAJOR DEPRESSIVE DISORDER WITH CURRENT ACTIVE EPISODE, UNSPECIFIED DEPRESSION EPISODE SEVERITY, UNSPECIFIED WHETHER RECURRENT: Primary | ICD-10-CM

## 2022-05-23 DIAGNOSIS — K21.00 GASTROESOPHAGEAL REFLUX DISEASE WITH ESOPHAGITIS, UNSPECIFIED WHETHER HEMORRHAGE: ICD-10-CM

## 2022-05-23 DIAGNOSIS — Z12.31 SCREENING MAMMOGRAM FOR BREAST CANCER: ICD-10-CM

## 2022-05-23 DIAGNOSIS — N39.41 URGE INCONTINENCE: ICD-10-CM

## 2022-05-23 DIAGNOSIS — Z12.11 SCREEN FOR COLON CANCER: ICD-10-CM

## 2022-05-23 DIAGNOSIS — I10 HYPERTENSION, UNSPECIFIED TYPE: ICD-10-CM

## 2022-05-23 DIAGNOSIS — Z78.0 MENOPAUSE: ICD-10-CM

## 2022-05-23 DIAGNOSIS — Z00.00 MEDICARE ANNUAL WELLNESS VISIT, SUBSEQUENT: Primary | ICD-10-CM

## 2022-05-23 DIAGNOSIS — R53.83 FATIGUE, UNSPECIFIED TYPE: ICD-10-CM

## 2022-05-23 PROBLEM — E78.2 MIXED HYPERLIPIDEMIA: Status: RESOLVED | Noted: 2021-04-28 | Resolved: 2022-05-23

## 2022-05-23 PROCEDURE — 99214 OFFICE O/P EST MOD 30 MIN: CPT | Performed by: NURSE PRACTITIONER

## 2022-05-23 PROCEDURE — 96160 PT-FOCUSED HLTH RISK ASSMT: CPT | Performed by: NURSE PRACTITIONER

## 2022-05-23 PROCEDURE — 1160F RVW MEDS BY RX/DR IN RCRD: CPT | Performed by: NURSE PRACTITIONER

## 2022-05-23 PROCEDURE — 1170F FXNL STATUS ASSESSED: CPT | Performed by: NURSE PRACTITIONER

## 2022-05-23 PROCEDURE — G0439 PPPS, SUBSEQ VISIT: HCPCS | Performed by: NURSE PRACTITIONER

## 2022-05-23 RX ORDER — AMLODIPINE BESYLATE 10 MG/1
TABLET ORAL
COMMUNITY
Start: 2022-04-06

## 2022-05-23 RX ORDER — ESOMEPRAZOLE MAGNESIUM 40 MG/1
40 CAPSULE, DELAYED RELEASE ORAL DAILY
Qty: 90 CAPSULE | Refills: 1 | Status: SHIPPED | OUTPATIENT
Start: 2022-05-23

## 2022-05-23 RX ORDER — ASPIRIN 81 MG/1
TABLET, CHEWABLE ORAL
COMMUNITY
End: 2022-05-23 | Stop reason: SDUPTHER

## 2022-05-23 NOTE — PROGRESS NOTES
The ABCs of the Annual Wellness Visit  Subsequent Medicare Wellness Visit    Chief Complaint   Patient presents with   • Medicare Wellness-subsequent      Subjective    History of Present Illness:  Samina Marin is a 71 y.o. female who presents for a Subsequent Medicare Wellness Visit.    The following portions of the patient's history were reviewed and   updated as appropriate: current medications, past family history, past medical history, past social history, past surgical history and problem list.    Compared to one year ago, the patient feels her physical   health is better.    Compared to one year ago, the patient feels her mental   health is better.    Recent Hospitalizations:  She was not admitted to the hospital during the last year.       Current Medical Providers:  Patient Care Team:  Jonelle Joseph APRN as PCP - General (Nurse Practitioner)    Outpatient Medications Prior to Visit   Medication Sig Dispense Refill   • amLODIPine (NORVASC) 10 MG tablet      • aspirin 81 MG EC tablet Take 81 mg by mouth Daily.     • cetirizine (zyrTEC) 10 MG tablet Take 10 mg by mouth Daily.     • DULoxetine (CYMBALTA) 60 MG capsule TAKE 1 CAPSULE DAILY 90 capsule 1   • esomeprazole (NexIUM) 40 MG capsule Take  by mouth daily.     • metoprolol tartrate (LOPRESSOR) 25 MG tablet TAKE 1 TABLET TWICE A  tablet 1   • tolterodine (DETROL) 2 MG tablet TAKE 1 TABLET TWICE A  tablet 1   • amLODIPine (NORVASC) 5 MG tablet Take 10 mg by mouth Daily.     • aspirin 81 MG chewable tablet aspirin 81 mg chewable tablet   Chew 1 tablet every day by oral route.       No facility-administered medications prior to visit.       No opioid medication identified on active medication list. I have reviewed chart for other potential  high risk medication/s and harmful drug interactions in the elderly.          Aspirin is on active medication list. Aspirin use is indicated based on review of current medical condition/s. Pros and cons of  "this therapy have been discussed today. Benefits of this medication outweigh potential harm.  Patient has been encouraged to continue taking this medication.  .      Patient Active Problem List   Diagnosis   • Allergic rhinitis   • Aortic valve regurgitation   • Arthritis   • Asthma   • Carpal tunnel syndrome, bilateral   • DDD (degenerative disc disease), lumbar   • Esophageal reflux   • Essential hypertension   • Fibromyalgia   • Major depressive disorder   • Mixed stress and urge urinary incontinence   • Mood disorder (HCC)   • Obesity   • Paroxysmal supraventricular tachycardia (HCC)   • Postmenopausal   • Shortness of breath   • Skin cancer   • Vitamin D deficiency   • Angina pectoris (HCC)     Advance Care Planning  Advance Directive is not on file.  ACP discussion was held with the patient during this visit. Patient has an advance directive (not in EMR), copy requested.          Objective    Vitals:    05/23/22 1347   BP: 104/49   Pulse: 78   SpO2: 98%   Weight: 108 kg (237 lb)   Height: 165.1 cm (65\")       Does the patient have evidence of cognitive impairment? No                HEALTH RISK ASSESSMENT    Smoking Status:  Social History     Tobacco Use   Smoking Status Never Smoker   Smokeless Tobacco Current User   Tobacco Comment    CAFFEINE USE     Alcohol Consumption:  Social History     Substance and Sexual Activity   Alcohol Use Yes    Comment: RARE     Fall Risk Screen:    STEADI Fall Risk Assessment has not been completed.    Depression Screening:  PHQ-2/PHQ-9 Depression Screening 5/23/2022   Little Interest or Pleasure in Doing Things 0-->not at all   Feeling Down, Depressed or Hopeless 0-->not at all   PHQ-9: Brief Depression Severity Measure Score 0       Health Habits and Functional and Cognitive Screening:  Functional & Cognitive Status 5/23/2022   Do you have difficulty preparing food and eating? No   Do you have difficulty bathing yourself, getting dressed or grooming yourself? No   Do you " have difficulty using the toilet? No   Do you have difficulty moving around from place to place? No   Do you have trouble with steps or getting out of a bed or a chair? No   Current Diet Unhealthy Diet   Dental Exam Up to date   Eye Exam Up to date   Exercise (times per week) 0 times per week   Do you need help using the phone?  No   Are you deaf or do you have serious difficulty hearing?  No   Do you need help with transportation? No   Do you need help shopping? No   Do you need help preparing meals?  No   Do you need help with housework?  No   Do you need help with laundry? No   Do you need help taking your medications? No   Do you need help managing money? No   Do you ever drive or ride in a car without wearing a seat belt? No   Have you felt unusual stress, anger or loneliness in the last month? No   Who do you live with? Alone   If you need help, do you have trouble finding someone available to you? No   Have you been bothered in the last four weeks by sexual problems? No   Do you have difficulty concentrating, remembering or making decisions? No       Age-appropriate Screening Schedule:  Refer to the list below for future screening recommendations based on patient's age, sex and/or medical conditions. Orders for these recommended tests are listed in the plan section. The patient has been provided with a written plan.    Health Maintenance   Topic Date Due   • TDAP/TD VACCINES (1 - Tdap) Never done   • ZOSTER VACCINE (1 of 2) Never done   • PAP SMEAR  Never done   • DXA SCAN  07/11/2021   • LIPID PANEL  03/22/2022   • INFLUENZA VACCINE  08/01/2022   • MAMMOGRAM  01/19/2023              Assessment & Plan   CMS Preventative Services Quick Reference  Risk Factors Identified During Encounter  None Identified  The above risks/problems have been discussed with the patient.  Follow up actions/plans if indicated are seen below in the Assessment/Plan Section.  Pertinent information has been shared with the patient in  the After Visit Summary.    Diagnoses and all orders for this visit:    1. Medicare annual wellness visit, subsequent (Primary)        Follow Up:   Return in about 1 year (around 5/23/2023) for Medicare Wellness.     An After Visit Summary and PPPS were made available to the patient.

## 2022-05-23 NOTE — PROGRESS NOTES
Chief Complaint  Hypertension, Depression, and Heartburn    Subjective            Samina Marin presents to [facility]  Pt here today for annual exam.    Pt sees Dr. Enrique at Cardiology.    Pt reports back and hip pain. Pt seen Dr. Staley for hip pain in the past and received injections but the pain has continued. Pt reports she has a hard time doing house work and yard work with her back pain. Pt states can only bend over a few times then has to rest.     Labs due.  Mammo 1/19/21  Dexa 07/11/19  Colon 07/18/17        Past Medical History:   Diagnosis Date   • Allergic rhinitis    • Chronic bronchitis (HCC)    • Perianal abscess    • Sinus bradycardia        Allergies   Allergen Reactions   • Meperidine Rash        Past Surgical History:   Procedure Laterality Date   • BACK SURGERY     • CARDIAC CATHETERIZATION N/A 12/9/2021    Procedure: Left Heart Cath;  Surgeon: Marily Maya MD;  Location:  [facility];  Service: Cardiology;  Laterality: N/A;   • CARDIAC CATHETERIZATION N/A 12/9/2021    Procedure: Coronary angiography;  Surgeon: Marily Maya MD;  Location:  [facility];  Service: Cardiology;  Laterality: N/A;   • CARDIAC CATHETERIZATION N/A 12/9/2021    Procedure: Left ventriculography;  Surgeon: Marily Maya MD;  Location:  [facility];  Service: Cardiology;  Laterality: N/A;   • CARDIAC ELECTROPHYSIOLOGY STUDY AND ABLATION     • HYSTERECTOMY     • ROTATOR CUFF REPAIR Right    • TENDON REPAIR Right     SHOULDER        Social History     Tobacco Use   • Smoking status: Never Smoker   • Smokeless tobacco: Current User   • Tobacco comment: CAFFEINE USE   Substance Use Topics   • Alcohol use: Yes     Comment: RARE       Family History   Problem Relation Age of Onset   • Heart disease Sister    • Ovarian cancer Sister    • Liver cancer Sister    • Heart disease Brother    • Hypertension Sister    • Pancreatic cancer  "Sister         Current Outpatient Medications on File Prior to Visit   Medication Sig   • amLODIPine (NORVASC) 10 MG tablet    • aspirin 81 MG EC tablet Take 81 mg by mouth Daily.   • cetirizine (zyrTEC) 10 MG tablet Take 10 mg by mouth Daily.   • DULoxetine (CYMBALTA) 60 MG capsule TAKE 1 CAPSULE DAILY   • metoprolol tartrate (LOPRESSOR) 25 MG tablet TAKE 1 TABLET TWICE A DAY   • tolterodine (DETROL) 2 MG tablet TAKE 1 TABLET TWICE A DAY   • [DISCONTINUED] amLODIPine (NORVASC) 5 MG tablet Take 10 mg by mouth Daily.   • [DISCONTINUED] esomeprazole (NexIUM) 40 MG capsule Take  by mouth daily.   • [DISCONTINUED] aspirin 81 MG chewable tablet aspirin 81 mg chewable tablet   Chew 1 tablet every day by oral route.     No current facility-administered medications on file prior to visit.       Health Maintenance Due   Topic Date Due   • Pneumococcal Vaccine 65+ (1 - PCV) Never done   • TDAP/TD VACCINES (1 - Tdap) Never done   • ZOSTER VACCINE (1 of 2) Never done   • HEPATITIS C SCREENING  Never done   • PAP SMEAR  Never done   • DXA SCAN  07/11/2021   • LIPID PANEL  03/22/2022       Objective     /49   Pulse 78   Ht 165.1 cm (65\")   Wt 108 kg (237 lb)   SpO2 98%   BMI 39.44 kg/m²       Physical Exam  Constitutional:       General: She is not in acute distress.     Appearance: Normal appearance. She is not ill-appearing.   HENT:      Head: Normocephalic and atraumatic.   Cardiovascular:      Rate and Rhythm: Normal rate and regular rhythm.      Pulses: Normal pulses.      Heart sounds: Normal heart sounds. No murmur heard.  Pulmonary:      Effort: Pulmonary effort is normal. No respiratory distress.      Breath sounds: Normal breath sounds.   Chest:      Chest wall: No tenderness.   Abdominal:      General: Abdomen is flat. Bowel sounds are normal. There is no distension.      Palpations: Abdomen is soft. There is no mass.      Tenderness: There is no abdominal tenderness. There is no guarding.   Musculoskeletal: "         General: No swelling or tenderness. Normal range of motion.      Cervical back: Normal range of motion and neck supple.   Skin:     General: Skin is warm and dry.      Findings: No rash.   Neurological:      General: No focal deficit present.      Mental Status: She is alert and oriented to person, place, and time. Mental status is at baseline.      Gait: Gait normal.   Psychiatric:         Mood and Affect: Mood normal.         Behavior: Behavior normal.         Thought Content: Thought content normal.         Judgment: Judgment normal.           Result Review :                           Assessment and Plan        Diagnoses and all orders for this visit:    1. Major depressive disorder with current active episode, unspecified depression episode severity, unspecified whether recurrent (Primary)  Comments:  stable on Cymbalta 60mg, continue    2. Screening mammogram for breast cancer  -     Mammo Screening Digital Tomosynthesis Bilateral With CAD; Future    3. Gastroesophageal reflux disease with esophagitis, unspecified whether hemorrhage  Comments:  stable on Nexium 40mg, continue  Orders:  -     esomeprazole (nexIUM) 40 MG capsule; Take 1 capsule by mouth Daily.  Dispense: 90 capsule; Refill: 1    4. Hypertension, unspecified type  Comments:  stable on metoprolol 25mg and norvasc 10mg, continue pt to Continue to f/u with Cardiology    5. Screen for colon cancer  -     Ambulatory Referral to General Surgery    6. Fatigue, unspecified type  -     CBC w AUTO Differential; Future  -     TSH; Future  -     T4, free; Future    7. Screening for lipid disorders  -     Comprehensive metabolic panel; Future  -     Lipid panel; Future    8. Urge incontinence  Comments:  stable on Detrol 2mg, continue    9. Menopause  -     DEXA Bone Density Axial              Follow Up     No follow-ups on file.    Patient was given instructions and counseling regarding her condition or for health maintenance advice. Please see  specific information pulled into the AVS if appropriate.     Samina GONZALEZ Marin  reports that she has never smoked. She uses smokeless tobacco..

## 2022-06-14 ENCOUNTER — TELEPHONE (OUTPATIENT)
Dept: FAMILY MEDICINE CLINIC | Facility: CLINIC | Age: 72
End: 2022-06-14

## 2022-06-22 ENCOUNTER — TELEPHONE (OUTPATIENT)
Dept: FAMILY MEDICINE CLINIC | Facility: CLINIC | Age: 72
End: 2022-06-22

## 2022-06-22 ENCOUNTER — TELEPHONE (OUTPATIENT)
Dept: SURGERY | Facility: CLINIC | Age: 72
End: 2022-06-22

## 2022-06-22 NOTE — TELEPHONE ENCOUNTER
SPOKE TO BALBINA AT [facility] OFFICE TO INFORM THAT PT CX NEW PT APPT WITH Jennifer CLEARY FOR 6/29

## 2022-08-16 ENCOUNTER — OFFICE VISIT (OUTPATIENT)
Dept: FAMILY MEDICINE CLINIC | Facility: CLINIC | Age: 72
End: 2022-08-16

## 2022-08-16 ENCOUNTER — TELEPHONE (OUTPATIENT)
Dept: FAMILY MEDICINE CLINIC | Facility: CLINIC | Age: 72
End: 2022-08-16

## 2022-08-16 ENCOUNTER — LAB (OUTPATIENT)
Dept: LAB | Facility: HOSPITAL | Age: 72
End: 2022-08-16

## 2022-08-16 ENCOUNTER — HOSPITAL ENCOUNTER (OUTPATIENT)
Dept: GENERAL RADIOLOGY | Facility: HOSPITAL | Age: 72
Discharge: HOME | End: 2022-08-16

## 2022-08-16 VITALS
OXYGEN SATURATION: 93 % | DIASTOLIC BLOOD PRESSURE: 66 MMHG | SYSTOLIC BLOOD PRESSURE: 124 MMHG | BODY MASS INDEX: 38.32 KG/M2 | HEIGHT: 65 IN | WEIGHT: 230 LBS | HEART RATE: 91 BPM

## 2022-08-16 DIAGNOSIS — Z13.220 SCREENING FOR LIPID DISORDERS: ICD-10-CM

## 2022-08-16 DIAGNOSIS — M25.561 PAIN AND SWELLING OF RIGHT KNEE: Primary | ICD-10-CM

## 2022-08-16 DIAGNOSIS — M25.461 PAIN AND SWELLING OF RIGHT KNEE: Primary | ICD-10-CM

## 2022-08-16 DIAGNOSIS — M25.561 PAIN AND SWELLING OF RIGHT KNEE: ICD-10-CM

## 2022-08-16 DIAGNOSIS — M25.461 PAIN AND SWELLING OF RIGHT KNEE: ICD-10-CM

## 2022-08-16 DIAGNOSIS — R53.83 FATIGUE, UNSPECIFIED TYPE: ICD-10-CM

## 2022-08-16 LAB
ALBUMIN SERPL-MCNC: 4.1 G/DL (ref 3.5–5.2)
ALBUMIN/GLOB SERPL: 1.6 G/DL
ALP SERPL-CCNC: 104 U/L (ref 39–117)
ALT SERPL W P-5'-P-CCNC: 9 U/L (ref 1–33)
ANION GAP SERPL CALCULATED.3IONS-SCNC: 8.7 MMOL/L (ref 5–15)
AST SERPL-CCNC: 15 U/L (ref 1–32)
BASOPHILS # BLD AUTO: 0.04 10*3/MM3 (ref 0–0.2)
BASOPHILS NFR BLD AUTO: 0.6 % (ref 0–1.5)
BILIRUB SERPL-MCNC: 0.6 MG/DL (ref 0–1.2)
BUN SERPL-MCNC: 9 MG/DL (ref 8–23)
BUN/CREAT SERPL: 12.9 (ref 7–25)
CALCIUM SPEC-SCNC: 9.2 MG/DL (ref 8.6–10.5)
CHLORIDE SERPL-SCNC: 105 MMOL/L (ref 98–107)
CHOLEST SERPL-MCNC: 213 MG/DL (ref 0–200)
CO2 SERPL-SCNC: 27.3 MMOL/L (ref 22–29)
CREAT SERPL-MCNC: 0.7 MG/DL (ref 0.57–1)
DEPRECATED RDW RBC AUTO: 40.7 FL (ref 37–54)
EGFRCR SERPLBLD CKD-EPI 2021: 92.6 ML/MIN/1.73
EOSINOPHIL # BLD AUTO: 0.17 10*3/MM3 (ref 0–0.4)
EOSINOPHIL NFR BLD AUTO: 2.7 % (ref 0.3–6.2)
ERYTHROCYTE [DISTWIDTH] IN BLOOD BY AUTOMATED COUNT: 13.7 % (ref 12.3–15.4)
GLOBULIN UR ELPH-MCNC: 2.5 GM/DL
GLUCOSE SERPL-MCNC: 92 MG/DL (ref 65–99)
HCT VFR BLD AUTO: 42.5 % (ref 34–46.6)
HDLC SERPL-MCNC: 74 MG/DL (ref 40–60)
HGB BLD-MCNC: 14 G/DL (ref 12–15.9)
IMM GRANULOCYTES # BLD AUTO: 0.01 10*3/MM3 (ref 0–0.05)
IMM GRANULOCYTES NFR BLD AUTO: 0.2 % (ref 0–0.5)
LDLC SERPL CALC-MCNC: 129 MG/DL (ref 0–100)
LDLC/HDLC SERPL: 1.72 {RATIO}
LYMPHOCYTES # BLD AUTO: 1.92 10*3/MM3 (ref 0.7–3.1)
LYMPHOCYTES NFR BLD AUTO: 30.7 % (ref 19.6–45.3)
MCH RBC QN AUTO: 27.7 PG (ref 26.6–33)
MCHC RBC AUTO-ENTMCNC: 32.9 G/DL (ref 31.5–35.7)
MCV RBC AUTO: 84.2 FL (ref 79–97)
MONOCYTES # BLD AUTO: 0.44 10*3/MM3 (ref 0.1–0.9)
MONOCYTES NFR BLD AUTO: 7 % (ref 5–12)
NEUTROPHILS NFR BLD AUTO: 3.67 10*3/MM3 (ref 1.7–7)
NEUTROPHILS NFR BLD AUTO: 58.8 % (ref 42.7–76)
NRBC BLD AUTO-RTO: 0 /100 WBC (ref 0–0.2)
PLATELET # BLD AUTO: 198 10*3/MM3 (ref 140–450)
PMV BLD AUTO: 10 FL (ref 6–12)
POTASSIUM SERPL-SCNC: 3.9 MMOL/L (ref 3.5–5.2)
PROT SERPL-MCNC: 6.6 G/DL (ref 6–8.5)
RBC # BLD AUTO: 5.05 10*6/MM3 (ref 3.77–5.28)
SODIUM SERPL-SCNC: 141 MMOL/L (ref 136–145)
T4 FREE SERPL-MCNC: 1.1 NG/DL (ref 0.93–1.7)
TRIGL SERPL-MCNC: 58 MG/DL (ref 0–150)
TSH SERPL DL<=0.05 MIU/L-ACNC: 1.21 UIU/ML (ref 0.27–4.2)
VLDLC SERPL-MCNC: 10 MG/DL (ref 5–40)
WBC NRBC COR # BLD: 6.25 10*3/MM3 (ref 3.4–10.8)

## 2022-08-16 PROCEDURE — 36415 COLL VENOUS BLD VENIPUNCTURE: CPT

## 2022-08-16 PROCEDURE — 99213 OFFICE O/P EST LOW 20 MIN: CPT | Performed by: NURSE PRACTITIONER

## 2022-08-16 PROCEDURE — 84443 ASSAY THYROID STIM HORMONE: CPT

## 2022-08-16 PROCEDURE — 73562 X-RAY EXAM OF KNEE 3: CPT

## 2022-08-16 PROCEDURE — 84439 ASSAY OF FREE THYROXINE: CPT

## 2022-08-16 PROCEDURE — 85025 COMPLETE CBC W/AUTO DIFF WBC: CPT

## 2022-08-16 PROCEDURE — 80053 COMPREHEN METABOLIC PANEL: CPT

## 2022-08-16 PROCEDURE — 80061 LIPID PANEL: CPT

## 2022-08-16 NOTE — PROGRESS NOTES
Chief Complaint  Right knee pain    Subjective            Samina Marin presents to [facility]  Pt has c/o (R) knee pain and swelling. Pt states this has been present for 4-5 mo. Pt feels it may be arthritis related. NKI. No recent imaging.  She thinks this is causing her lower leg to hurt as well as she is favoring it. Pt states this has been present for 1 mo. Pt feels this is muscular.  She denies any swelling or heat in her calf. She denies any SOA.    She states she has a long hx of arthritis and has never taken anything for it and would like to try and antiinflammatory.         Past Medical History:   Diagnosis Date   • Allergic rhinitis    • Chronic bronchitis (HCC)    • Perianal abscess    • Sinus bradycardia        Allergies   Allergen Reactions   • Meperidine Rash        Past Surgical History:   Procedure Laterality Date   • BACK SURGERY     • CARDIAC CATHETERIZATION N/A 12/9/2021    Procedure: Left Heart Cath;  Surgeon: Marily Maya MD;  Location:  [facility];  Service: Cardiology;  Laterality: N/A;   • CARDIAC CATHETERIZATION N/A 12/9/2021    Procedure: Coronary angiography;  Surgeon: Marily Maya MD;  Location:  [facility];  Service: Cardiology;  Laterality: N/A;   • CARDIAC CATHETERIZATION N/A 12/9/2021    Procedure: Left ventriculography;  Surgeon: Marily Maya MD;  Location:  [facility];  Service: Cardiology;  Laterality: N/A;   • CARDIAC ELECTROPHYSIOLOGY STUDY AND ABLATION     • HYSTERECTOMY     • ROTATOR CUFF REPAIR Right    • TENDON REPAIR Right     SHOULDER        Social History     Tobacco Use   • Smoking status: Never Smoker   • Smokeless tobacco: Never Used   • Tobacco comment: CAFFEINE USE   Substance Use Topics   • Alcohol use: Yes     Comment: RARE       Family History   Problem Relation Age of Onset   • Heart disease Sister    • Ovarian cancer Sister    • Liver cancer Sister    • Heart  "disease Brother    • Hypertension Sister    • Pancreatic cancer Sister         Current Outpatient Medications on File Prior to Visit   Medication Sig   • amLODIPine (NORVASC) 10 MG tablet    • aspirin 81 MG EC tablet Take 81 mg by mouth Daily.   • cetirizine (zyrTEC) 10 MG tablet Take 10 mg by mouth Daily.   • DULoxetine (CYMBALTA) 60 MG capsule TAKE 1 CAPSULE DAILY   • esomeprazole (nexIUM) 40 MG capsule Take 1 capsule by mouth Daily.   • metoprolol tartrate (LOPRESSOR) 25 MG tablet TAKE 1 TABLET TWICE A DAY   • tolterodine (DETROL) 2 MG tablet TAKE 1 TABLET TWICE A DAY     No current facility-administered medications on file prior to visit.       Health Maintenance Due   Topic Date Due   • Pneumococcal Vaccine 65+ (1 - PCV) Never done   • TDAP/TD VACCINES (1 - Tdap) Never done   • ZOSTER VACCINE (1 of 2) Never done   • HEPATITIS C SCREENING  Never done   • PAP SMEAR  Never done   • DXA SCAN  07/11/2021   • LIPID PANEL  03/22/2022   • COVID-19 Vaccine (4 - Booster for Moderna series) 04/01/2022       Objective     /66   Pulse 91   Ht 165.1 cm (65\")   Wt 104 kg (230 lb)   SpO2 93%   BMI 38.27 kg/m²       Physical Exam  Constitutional:       General: She is not in acute distress.     Appearance: Normal appearance. She is not ill-appearing.   HENT:      Head: Normocephalic and atraumatic.   Cardiovascular:      Rate and Rhythm: Normal rate and regular rhythm.      Heart sounds: Normal heart sounds. No murmur heard.  Pulmonary:      Effort: Pulmonary effort is normal. No respiratory distress.      Breath sounds: Normal breath sounds.   Chest:      Chest wall: No tenderness.   Abdominal:      General: There is no distension.      Palpations: There is no mass.      Tenderness: There is no abdominal tenderness. There is no guarding.   Musculoskeletal:         General: No swelling.      Cervical back: Normal range of motion and neck supple.      Right knee: No swelling, deformity, erythema or ecchymosis. " Decreased range of motion. Tenderness present.      Comments: Negative Blanco's sign, no calf tenderness or heat or swelling noted.    Tenderness noted anna sides of right knee cap upon palpation.  No swelling noted.   Skin:     General: Skin is warm and dry.      Findings: No rash.   Neurological:      General: No focal deficit present.      Mental Status: She is alert and oriented to person, place, and time. Mental status is at baseline.      Gait: Gait normal.   Psychiatric:         Mood and Affect: Mood normal.         Behavior: Behavior normal.         Thought Content: Thought content normal.         Judgment: Judgment normal.           Result Review :                           Assessment and Plan        Diagnoses and all orders for this visit:    1. Pain and swelling of right knee (Primary)  -     XR Knee 3 View Right; Future  -     diclofenac (VOLTAREN) 50 MG EC tablet; Take 1 tablet by mouth 2 (Two) Times a Day As Needed (joint pain).  Dispense: 60 tablet; Refill: 1              Follow Up     Return if symptoms worsen or fail to improve.    Patient was given instructions and counseling regarding her condition or for health maintenance advice. Please see specific information pulled into the AVS if appropriate.     Samina CARLOS Marin  reports that she has never smoked. She has never used smokeless tobacco..

## 2022-08-16 NOTE — TELEPHONE ENCOUNTER
Caller: Samina Marin    Relationship: Self    Best call back number: [phone]    What is the best time to reach you: ANYONE     Who are you requesting to speak with (clinical staff, provider,  specific staff member): CLINICAL     Do you know the name of the person who called: EILEEN     What was the call regarding: PATIENT MISSED A CALL FROM THE OFFICE REGARDING COMING IN EARLIER TODAY FOR HER APPOINTMENT. HOWEVER, SHE MISSED IT BECAUSE SHE WAS ASLEEP.     Do you require a callback: YES         HUB ATTEMPTED TO WARM TRANSFER TO THE OFFICE AND NO ANSWER. THANKS.

## 2022-08-17 ENCOUNTER — TELEPHONE (OUTPATIENT)
Dept: FAMILY MEDICINE CLINIC | Facility: CLINIC | Age: 72
End: 2022-08-17

## 2022-08-17 DIAGNOSIS — M25.561 PAIN AND SWELLING OF RIGHT KNEE: Primary | ICD-10-CM

## 2022-08-17 DIAGNOSIS — M25.461 PAIN AND SWELLING OF RIGHT KNEE: Primary | ICD-10-CM

## 2022-08-17 NOTE — TELEPHONE ENCOUNTER
----- Message from HORTENSIA Schmitt sent at 8/17/2022  6:42 AM EDT -----  [facility] if pt wishes pt proceed with MRI we can do so or ortho referrral

## 2022-08-22 DIAGNOSIS — I10 ESSENTIAL HYPERTENSION: ICD-10-CM

## 2022-08-22 DIAGNOSIS — F32.A DEPRESSION, UNSPECIFIED DEPRESSION TYPE: ICD-10-CM

## 2022-08-22 DIAGNOSIS — N32.81 OVERACTIVE BLADDER: ICD-10-CM

## 2022-08-22 RX ORDER — DULOXETIN HYDROCHLORIDE 60 MG/1
CAPSULE, DELAYED RELEASE ORAL
Qty: 90 CAPSULE | Refills: 1 | Status: SHIPPED | OUTPATIENT
Start: 2022-08-22

## 2022-08-22 RX ORDER — TOLTERODINE TARTRATE 2 MG/1
TABLET, EXTENDED RELEASE ORAL
Qty: 180 TABLET | Refills: 1 | Status: SHIPPED | OUTPATIENT
Start: 2022-08-22

## 2022-08-25 ENCOUNTER — HOSPITAL ENCOUNTER (OUTPATIENT)
Dept: BONE DENSITY | Facility: HOSPITAL | Age: 72
Discharge: HOME | End: 2022-08-25

## 2022-08-25 ENCOUNTER — TELEPHONE (OUTPATIENT)
Dept: FAMILY MEDICINE CLINIC | Facility: CLINIC | Age: 72
End: 2022-08-25

## 2022-08-25 ENCOUNTER — HOSPITAL ENCOUNTER (OUTPATIENT)
Dept: MAMMOGRAPHY | Facility: HOSPITAL | Age: 72
Discharge: HOME | End: 2022-08-25

## 2022-08-25 DIAGNOSIS — M54.41 CHRONIC BILATERAL LOW BACK PAIN WITH BILATERAL SCIATICA: Primary | ICD-10-CM

## 2022-08-25 DIAGNOSIS — Z12.31 SCREENING MAMMOGRAM FOR BREAST CANCER: ICD-10-CM

## 2022-08-25 DIAGNOSIS — G89.29 CHRONIC BILATERAL LOW BACK PAIN WITH BILATERAL SCIATICA: Primary | ICD-10-CM

## 2022-08-25 DIAGNOSIS — M54.42 CHRONIC BILATERAL LOW BACK PAIN WITH BILATERAL SCIATICA: Primary | ICD-10-CM

## 2022-08-25 PROCEDURE — 77067 SCR MAMMO BI INCL CAD: CPT

## 2022-08-25 PROCEDURE — 77080 DXA BONE DENSITY AXIAL: CPT

## 2022-08-25 PROCEDURE — 77063 BREAST TOMOSYNTHESIS BI: CPT

## 2022-08-25 NOTE — TELEPHONE ENCOUNTER
Ok to place referral however if pt does not have a recent MRI he will not see her, may place if needed

## 2022-08-25 NOTE — TELEPHONE ENCOUNTER
Caller: Samina Marin    Relationship: Self    Best call back number: [phone]    What is the medical concern/diagnosis: SCIATIC PAIN, RIGHT LEG PAIN    What specialty or service is being requested: NEUROSURGEON    What is the provider, practice or medical service name: [facility]    What is the office location: [address] [place] [address] [address], [place] [address]    What is the office phone number: [phone]    Any additional details: THE PATIENT STATED SHE NEEDS A NEW REFERRAL TO SEE DR. LIMA FOR HER SCIATIC PAIN. SHE WOULD LIKE PCP TO TEXT/CALL HER TO CONFIRM REFERRAL HAS BEEN PLACED.

## 2022-08-29 ENCOUNTER — TELEPHONE (OUTPATIENT)
Dept: FAMILY MEDICINE CLINIC | Facility: CLINIC | Age: 72
End: 2022-08-29

## 2022-08-29 DIAGNOSIS — M51.36 DDD (DEGENERATIVE DISC DISEASE), LUMBAR: ICD-10-CM

## 2022-08-29 DIAGNOSIS — M54.42 CHRONIC BILATERAL LOW BACK PAIN WITH BILATERAL SCIATICA: Primary | ICD-10-CM

## 2022-08-29 DIAGNOSIS — M54.41 CHRONIC BILATERAL LOW BACK PAIN WITH BILATERAL SCIATICA: Primary | ICD-10-CM

## 2022-08-29 DIAGNOSIS — G89.29 CHRONIC BILATERAL LOW BACK PAIN WITH BILATERAL SCIATICA: Primary | ICD-10-CM

## 2022-09-13 ENCOUNTER — TELEPHONE (OUTPATIENT)
Dept: FAMILY MEDICINE CLINIC | Facility: CLINIC | Age: 72
End: 2022-09-13

## 2022-09-13 ENCOUNTER — HOSPITAL ENCOUNTER (OUTPATIENT)
Dept: MRI IMAGING | Facility: HOSPITAL | Age: 72
Discharge: HOME | End: 2022-09-13
Admitting: NURSE PRACTITIONER

## 2022-09-13 DIAGNOSIS — M25.461 PAIN AND SWELLING OF RIGHT KNEE: ICD-10-CM

## 2022-09-13 DIAGNOSIS — M25.561 PAIN AND SWELLING OF RIGHT KNEE: ICD-10-CM

## 2022-09-13 PROCEDURE — 73721 MRI JNT OF LWR EXTRE W/O DYE: CPT

## 2022-09-13 NOTE — TELEPHONE ENCOUNTER
Spoke with tech doing MRI for pt. Verbal requested to change to without contrast. Contrast not needed for pain and swelling. Verbal order given.

## 2022-09-14 ENCOUNTER — TELEPHONE (OUTPATIENT)
Dept: FAMILY MEDICINE CLINIC | Facility: CLINIC | Age: 72
End: 2022-09-14

## 2022-09-14 DIAGNOSIS — M25.461 PAIN AND SWELLING OF RIGHT KNEE: Primary | ICD-10-CM

## 2022-09-14 DIAGNOSIS — M25.561 CHRONIC PAIN OF RIGHT KNEE: Primary | ICD-10-CM

## 2022-09-14 DIAGNOSIS — M25.561 PAIN AND SWELLING OF RIGHT KNEE: Primary | ICD-10-CM

## 2022-09-14 DIAGNOSIS — M25.461 SWELLING OF RIGHT KNEE JOINT: ICD-10-CM

## 2022-09-14 DIAGNOSIS — G89.29 CHRONIC PAIN OF RIGHT KNEE: Primary | ICD-10-CM

## 2022-09-14 NOTE — TELEPHONE ENCOUNTER
----- Message from HORTENSIA Schmitt sent at 9/14/2022  9:19 AM EDT -----  Nothing acute, consult ortho if pt still having pain

## 2022-09-25 ENCOUNTER — HOSPITAL ENCOUNTER (OUTPATIENT)
Dept: MRI IMAGING | Facility: HOSPITAL | Age: 72
Discharge: HOME | End: 2022-09-25
Admitting: NURSE PRACTITIONER

## 2022-09-25 DIAGNOSIS — M51.36 DDD (DEGENERATIVE DISC DISEASE), LUMBAR: ICD-10-CM

## 2022-09-25 DIAGNOSIS — G89.29 CHRONIC BILATERAL LOW BACK PAIN WITH BILATERAL SCIATICA: ICD-10-CM

## 2022-09-25 DIAGNOSIS — M54.42 CHRONIC BILATERAL LOW BACK PAIN WITH BILATERAL SCIATICA: ICD-10-CM

## 2022-09-25 DIAGNOSIS — M54.41 CHRONIC BILATERAL LOW BACK PAIN WITH BILATERAL SCIATICA: ICD-10-CM

## 2022-09-25 PROCEDURE — A9577 INJ MULTIHANCE: HCPCS | Performed by: NURSE PRACTITIONER

## 2022-09-25 PROCEDURE — 0 GADOBENATE DIMEGLUMINE 529 MG/ML SOLUTION: Performed by: NURSE PRACTITIONER

## 2022-09-25 PROCEDURE — 72158 MRI LUMBAR SPINE W/O & W/DYE: CPT

## 2022-09-25 RX ADMIN — GADOBENATE DIMEGLUMINE 20 ML: 529 INJECTION, SOLUTION INTRAVENOUS at 14:03

## 2022-10-04 ENCOUNTER — OFFICE VISIT (OUTPATIENT)
Dept: NEUROSURGERY | Facility: CLINIC | Age: 72
End: 2022-10-04

## 2022-10-04 VITALS
SYSTOLIC BLOOD PRESSURE: 125 MMHG | HEART RATE: 72 BPM | DIASTOLIC BLOOD PRESSURE: 67 MMHG | HEIGHT: 65 IN | WEIGHT: 230.4 LBS | BODY MASS INDEX: 38.39 KG/M2

## 2022-10-04 DIAGNOSIS — M51.36 DDD (DEGENERATIVE DISC DISEASE), LUMBAR: Primary | ICD-10-CM

## 2022-10-04 DIAGNOSIS — M54.6 PAIN IN THORACIC SPINE: ICD-10-CM

## 2022-10-04 DIAGNOSIS — M47.816 FACET ARTHRITIS OF LUMBAR REGION: ICD-10-CM

## 2022-10-04 DIAGNOSIS — G89.29 CHRONIC MIDLINE LOW BACK PAIN WITH RIGHT-SIDED SCIATICA: ICD-10-CM

## 2022-10-04 DIAGNOSIS — M54.41 CHRONIC MIDLINE LOW BACK PAIN WITH RIGHT-SIDED SCIATICA: ICD-10-CM

## 2022-10-04 PROCEDURE — 99204 OFFICE O/P NEW MOD 45 MIN: CPT | Performed by: PHYSICIAN ASSISTANT

## 2022-10-04 NOTE — PROGRESS NOTES
"Chief Complaint  Back Pain, Hip Pain (right), and Leg Pain (Right/ posterior)    Subjective          Samina Marin who is a 71 y.o. year old female who presents to [facility] for Evaluation of the Spine.     The patient complains of pain located in the Lumbar Spine.  Patients states the pain has been present for 20 months.  The pain came on acutely.  The pain scaled level is 5.  The pain does radiate. Dermatomes are located on right Lumbar at: a non-specific dermatome and down the back of the leg to the ankle..  The pain is constant and waxing/waning and described as \"constant pain\".  The pain is worse at no particular time of day. Patient states laying down flat makes the pain better.  Patient states Bending and moving around makes the pain worse.    Associated Symptoms Include: Denies numbness or tingling, reports subjective weakness in the right leg. Denies loss of bowel or bladder control.  Conservative Interventions Include: Physical Therapy that was ineffective. and NSAIDs that were ineffective.    Was this the result of an injury or accident?: Yes, Fall on ice and snow about 20 months ago.    History of Previous Spinal Surgery?: Yes.  Lumbar, Date 30 years ago.     reports that she has never smoked. She has never used smokeless tobacco.    Review of Systems   Musculoskeletal: Positive for back pain.        Objective   Vital Signs:   /67   Pulse 72   Ht 165.1 cm (65\")   Wt 105 kg (230 lb 6.4 oz)   BMI 38.34 kg/m²       Physical Exam  Constitutional:       Appearance: Normal appearance. She is obese.   Pulmonary:      Effort: Pulmonary effort is normal.   Musculoskeletal:         General: Tenderness (midline thoracic spine, midline lumbar spine, right greater trochanteric area) present.      Comments: SLR negative bilaterally   Neurological:      General: No focal deficit present.      Mental Status: She is alert and oriented to person, place, and time.      " Sensory: No sensory deficit.      Motor: No weakness.      Deep Tendon Reflexes: Reflexes normal.   Psychiatric:         Mood and Affect: Mood normal.         Behavior: Behavior normal.        Neurologic Exam     Mental Status   Oriented to person, place, and time.        Result Review     I have personally reviewed the MRI of lumbar spine without contrast from 9/25/2022 which shows multilevel degenerative disc disease and facet arthritis.  At L3-L4 there is mild bilateral foraminal stenosis.  At L4-L5 there is mild central canal stenosis and lateral recess stenosis bilaterally.     Assessment and Plan    Diagnoses and all orders for this visit:    1. DDD (degenerative disc disease), lumbar (Primary)  -     Ambulatory Referral to Pain Management    2. Facet arthritis of lumbar region  -     Ambulatory Referral to Pain Management    3. Chronic midline low back pain with right-sided sciatica  -     Ambulatory Referral to Pain Management    4. Pain in thoracic spine  -     XR Spine Thoracic 3 View; Future    Most of her pain is in the right leg to the ankle.    She has multilevel degenerative changes including facet arthritis. There is some mild foraminal stenosis bilaterally at L3-L4. This does not well explain her right leg complaints.    Her pain seems to be more in an S1 pattern, but there is not significant stenosis around the S1 nerve.    She could benefit from a trial of LESB. I will refer her for pain management consultation.    She has tenderness around the right greater trochanter. She may benefit from right greater trochanteric bursa injections.    The patient was counseled on basic recommendations for the reduction and prevention of back, neck, or spine pain in association with spinal disorders, including: cessation/avoidance of nicotine use, maintenance of a healthy BMI and weight, focusing on building/maintaining core strength through core exercise, and avoidance of activities which worsen the pain.  Surgery for patients with multilevel degenerative disc disease is not typically successful, with the risks outweighing the benefit. The patient will monitor for changes in symptoms and notify our clinic of these changes as needed.    She will follow-up here PRN, I will notify her of the thoracic spine x-ray results once available.    Follow Up   Return if symptoms worsen or fail to improve.  Patient was given instructions and counseling regarding her condition or for health maintenance advice. Please see specific information pulled into the AVS if appropriate.

## 2022-10-12 ENCOUNTER — OFFICE VISIT (OUTPATIENT)
Dept: PAIN MEDICINE | Facility: CLINIC | Age: 72
End: 2022-10-12

## 2022-10-12 ENCOUNTER — PREP FOR SURGERY (OUTPATIENT)
Dept: SURGERY | Facility: SURGERY CENTER | Age: 72
End: 2022-10-12

## 2022-10-12 VITALS
HEART RATE: 80 BPM | TEMPERATURE: 97.8 F | SYSTOLIC BLOOD PRESSURE: 120 MMHG | RESPIRATION RATE: 16 BRPM | WEIGHT: 228 LBS | BODY MASS INDEX: 37.99 KG/M2 | HEIGHT: 65 IN | OXYGEN SATURATION: 95 % | DIASTOLIC BLOOD PRESSURE: 78 MMHG

## 2022-10-12 DIAGNOSIS — M51.36 DDD (DEGENERATIVE DISC DISEASE), LUMBAR: ICD-10-CM

## 2022-10-12 DIAGNOSIS — M54.16 LUMBAR RADICULOPATHY: Primary | ICD-10-CM

## 2022-10-12 DIAGNOSIS — M70.61 TROCHANTERIC BURSITIS OF RIGHT HIP: ICD-10-CM

## 2022-10-12 DIAGNOSIS — M62.838 MUSCLE SPASM: ICD-10-CM

## 2022-10-12 PROCEDURE — 20610 DRAIN/INJ JOINT/BURSA W/O US: CPT | Performed by: PHYSICIAN ASSISTANT

## 2022-10-12 PROCEDURE — 99204 OFFICE O/P NEW MOD 45 MIN: CPT | Performed by: PHYSICIAN ASSISTANT

## 2022-10-12 RX ORDER — GABAPENTIN 100 MG/1
100 CAPSULE ORAL 3 TIMES DAILY
Qty: 90 CAPSULE | Refills: 0 | Status: SHIPPED | OUTPATIENT
Start: 2022-10-12 | End: 2022-11-21

## 2022-10-12 RX ORDER — BUPIVACAINE HYDROCHLORIDE 2.5 MG/ML
INJECTION, SOLUTION EPIDURAL; INFILTRATION; INTRACAUDAL
Status: DISCONTINUED | OUTPATIENT
Start: 2022-10-12 | End: 2022-10-12 | Stop reason: HOSPADM

## 2022-10-12 RX ORDER — SODIUM CHLORIDE 0.9 % (FLUSH) 0.9 %
10 SYRINGE (ML) INJECTION AS NEEDED
Status: CANCELLED | OUTPATIENT
Start: 2022-10-12

## 2022-10-12 RX ORDER — SODIUM CHLORIDE 0.9 % (FLUSH) 0.9 %
10 SYRINGE (ML) INJECTION EVERY 12 HOURS SCHEDULED
Status: CANCELLED | OUTPATIENT
Start: 2022-10-12

## 2022-10-12 RX ORDER — METHYLPREDNISOLONE ACETATE 40 MG/ML
40 INJECTION, SUSPENSION INTRA-ARTICULAR; INTRALESIONAL; INTRAMUSCULAR; SOFT TISSUE
Status: DISCONTINUED | OUTPATIENT
Start: 2022-10-12 | End: 2022-10-12 | Stop reason: HOSPADM

## 2022-10-12 RX ADMIN — BUPIVACAINE HYDROCHLORIDE: 2.5 INJECTION, SOLUTION EPIDURAL; INFILTRATION; INTRACAUDAL at 14:15

## 2022-10-12 RX ADMIN — METHYLPREDNISOLONE ACETATE 40 MG: 40 INJECTION, SUSPENSION INTRA-ARTICULAR; INTRALESIONAL; INTRAMUSCULAR; SOFT TISSUE at 14:15

## 2022-10-12 NOTE — PROGRESS NOTES
CHIEF COMPLAINT  Neurosurgery for evaluation of lumbar and right sciatica pain    Subjective   Samina Marin is a 71 y.o. female.   She presents to the office for initial evaluation of low back and right lower extremity pain. She was referred here by Bhupinder Jha PA-C.  This patient reports onset of acute on chronic low back pain dating back 20 months ago secondary to a fall on ice.  Patient states that her legs went out from under her and she landed straight on her bottom.  Since that incident she has had persistent pain primarily affecting the right lumbar paraspinal pain radiating to the posterior aspect of the right lower extremity terminating at the foot.  States that majority of the pain is localized within the posterior aspect of the right calf with concomitant numbness and tingling radiating from the knee distally to the foot.  Pain is described as a deep and aching sensation whereas the leg pain is very sharp.    Patient has previous history of lumbar surgery dating back over 30 years and she is unsure of the levels but knows that there was no hardware placed.  She is also completed lumbar FELICE's over 30 years ago with great relief for numerous years.    Most recently she completed a formalized course of physical therapy in the winter 2021 with ice and heat therapy alternated with temporary relief.  Patient states that she gets mixed results with her current TENS unit which she obtained 30 years ago.    Back Pain  This is a chronic problem. The current episode started more than 1 year ago. The problem occurs constantly. The problem has been gradually worsening since onset. The pain is present in the lumbar spine. The quality of the pain is described as burning, aching and shooting. The pain radiates to the right foot and right thigh. The pain is at a severity of 5/10. The pain is moderate. The pain is the same all the time. The symptoms are aggravated by sitting and standing (Walking and driving).  Associated symptoms include leg pain, numbness, tingling and weakness. Pertinent negatives include no chest pain or fever. She has tried heat, ice and NSAIDs for the symptoms. The treatment provided no relief.        PEG Assessment   What number best describes your pain on average in the past week?7  What number best describes how, during the past week, pain has interfered with your enjoyment of life?8  What number best describes how, during the past week, pain has interfered with your general activity?  8        Current Outpatient Medications:   •  amLODIPine (NORVASC) 10 MG tablet, , Disp: , Rfl:   •  aspirin 81 MG EC tablet, Take 81 mg by mouth Daily., Disp: , Rfl:   •  cetirizine (zyrTEC) 10 MG tablet, Take 10 mg by mouth Daily., Disp: , Rfl:   •  diclofenac (VOLTAREN) 50 MG EC tablet, Take 1 tablet by mouth 2 (Two) Times a Day As Needed (joint pain)., Disp: 60 tablet, Rfl: 1  •  DULoxetine (CYMBALTA) 60 MG capsule, TAKE 1 CAPSULE DAILY, Disp: 90 capsule, Rfl: 1  •  esomeprazole (nexIUM) 40 MG capsule, Take 1 capsule by mouth Daily., Disp: 90 capsule, Rfl: 1  •  metoprolol tartrate (LOPRESSOR) 25 MG tablet, TAKE 1 TABLET TWICE A DAY, Disp: 180 tablet, Rfl: 1  •  tolterodine (DETROL) 2 MG tablet, TAKE 1 TABLET TWICE A DAY, Disp: 180 tablet, Rfl: 1    The following portions of the patient's history were reviewed and updated as appropriate: allergies, current medications, past family history, past medical history, past social history, past surgical history and problem list.      REVIEW OF PERTINENT MEDICAL DATA    MRI EXAMINATION OF THE LUMBAR SPINE WITH AND WITHOUT CONTRAST     HISTORY: Back pain, right radiculopathy. Previous lumbar surgery.     COMPARISON: None.     FINDINGS: The alignment of the lumbar spine is within normal limits.  Mild levoscoliosis of the lumbar spine is appreciated with the apex at  the level of L2-L3. The conus is at L1 and the caudal aspect of the  spinal cord appears unremarkable. The  hemangioma involving the posterior  lateral aspect of L1 is appreciated to the right measuring approximately  2.3 cm in maximum transverse dimension.     T12-L1: A mild broad-based disc osteophyte complex is present with no  evidence of herniation.     L1-L2: A mild broad-based disc osteophyte complex is present resulting  in mild flattening of the ventral surface of the thecal sac.     L2-L3: A broad-based disc osteophyte complex is present resulting in  mild flattening of the ventral surface of the thecal sac.  Mild facet  degenerative disease is present bilaterally. Mild foraminal stenosis is  present bilaterally secondary to extension of a small disc osteophyte  complex into the neural foramen.     L3-L4: A mild broad-based disc osteophyte complex is present as well as  mild facet degenerative disease bilaterally. The disc osteophyte complex  contributes to mild foraminal stenosis bilaterally, slightly more  prominent on the right where disc material approaches the right L3 nerve  as it exits the neural foramen.     L4-L5: A broad-based disc osteophyte complex is present which results in  mild canal stenosis and contributes to mild and moderate lateral recess  narrowing on the right and left respectively. A small annular tear is  present posterior laterally bilaterally. Mild foraminal stenosis is  present bilaterally, slightly more prominent on the left secondary to  loss of disc height and extension of a disc osteophyte complex into the  neural foramen.     L5-S1: Transitional anatomy is appreciated with partial sacralization of  L5. Mild facet degenerative disease is present bilaterally. There is no  evidence of disc bulge or herniation.     After contrast administration there was mild enhancement involving the  posterior aspect of the discs at L3-L4 and L4-L5.     IMPRESSION:  There is no evidence of a focal disc herniation. Multilevel  degenerative disease involving the lumbar spine is noted as described  "in  detail above including multilevel facet degenerative disease and  broad-based disc osteophyte complexes. This is most prominent at L3-L4  and L4-L5. At L3-L4 a disc osteophyte complex approaches the right L3  nerve as it exits the neural foramen. It does not definitively involve  the nerve. At L4-L5 there is mild canal stenosis and lateral recess  narrowing bilaterally, slightly more prominent on the left. See above.     This report was finalized on 9/27/2022 7:34 AM by Dr. Lupillo Monge M.D.    Review of Systems   Constitutional: Negative for fever.   HENT: Negative for congestion.    Eyes: Negative for visual disturbance.   Respiratory: Negative for shortness of breath.    Cardiovascular: Negative for chest pain.   Gastrointestinal: Positive for constipation. Negative for diarrhea.   Genitourinary: Negative for difficulty urinating and dyspareunia.   Musculoskeletal: Positive for back pain, joint swelling and myalgias (Back spasms).   Neurological: Positive for tingling, weakness and numbness.   Psychiatric/Behavioral: Positive for sleep disturbance. Negative for suicidal ideas.     I have reviewed and confirmed the accuracy of the ROS as documented by the MA/LPN/RN JERONIMO Johnson      Vitals:    10/12/22 1326   BP: 120/78   Pulse: 80   Resp: 16   Temp: 97.8 °F (36.6 °C)   SpO2: 95%   Weight: 103 kg (228 lb)   Height: 165.1 cm (65\")   PainSc:   5   PainLoc: Back     Objective     Physical Exam  Vitals and nursing note reviewed.   Constitutional:       Appearance: Normal appearance.   HENT:      Head: Normocephalic.   Pulmonary:      Effort: Pulmonary effort is normal.   Musculoskeletal:      Lumbar back: Spasms and tenderness (tenderness over the SI joint spaces, R>L) present. Decreased range of motion. Positive right straight leg raise test.      Right hip: Tenderness (Exquisite tenderness to palpation over the right trochanteric bursa) present.   Skin:     General: Skin is warm and dry.   Neurological: "      General: No focal deficit present.      Mental Status: She is alert and oriented to person, place, and time.      Cranial Nerves: Cranial nerves 2-12 are intact.      Sensory: Sensation is intact.      Motor: Motor function is intact.      Gait: Gait abnormal.      Deep Tendon Reflexes:      Reflex Scores:       Patellar reflexes are 2+ on the right side and 2+ on the left side.       Achilles reflexes are 2+ on the right side and 2+ on the left side.  Psychiatric:         Mood and Affect: Mood normal.         Behavior: Behavior normal.         Thought Content: Thought content normal.         Judgment: Judgment normal.         Large Joint Arthrocentesis: R greater trochanteric bursa  Date/Time: 10/12/2022 2:15 PM  Consent given by: patient  Site marked: site marked  Timeout: Immediately prior to procedure a time out was called to verify the correct patient, procedure, equipment, support staff and site/side marked as required   Supporting Documentation  Indications: pain and diagnostic evaluation   Procedure Details  Location: hip - R greater trochanteric bursa  Preparation: Patient was prepped and draped in the usual sterile fashion  Needle size: 25 G  Approach: lateral  Medications administered: bupivacaine (PF) 0.25 %; 40 mg methylPREDNISolone acetate 40 MG/ML          Assessment & Plan   Diagnoses and all orders for this visit:    1. Lumbar radiculopathy (Primary)    2. DDD (degenerative disc disease), lumbar    3. Trochanteric bursitis of right hip  -     Large Joint Arthrocentesis    4. Muscle spasm        --- I will have the patient return for #1 diagnostic right L4, L5 TFESI for persistent radicular symptoms  --- Due to the right lower extremity radicular symptoms I will have the patient institute a low-dose trial of gabapentin 100 mg to be titrated up to 3 times daily dosing tolerated  --- I will also order an updated TENS unit for her utilization for persistent muscle spasms and low back pain  --- RTC  in 4 weeks for further evaluation of response to bursa injection performed today     Risk of serious complications from epidural injections:  best estimates of incidence (updated September 2021)  - These statistics are derived from several academic publications.    - The disclaimer is that this may not be a completely exhaustive list, and this does not address common side effects from procedures such as postanesthesia care unit nausea, procedural site soreness, numbness from local anesthetic numbing medication, etc.    - This list does comprehensively hope to address reasonable potential serious complications from these interventional neuraxial procedures, and thankfully all are quite rare.    - Safety from interventional pain procedures is the product of appropriate skill and training, and this includes the entire medical and nursing team understanding of these procedures and the process, fellowship trained and board certified interventional pain specialists, and use of appropriate imaging modalities to confirm the appropriate application of the techniques  -Overall incidence of any serious complication that includes temporary and reversible complications has been estimated at 0.11%.  Overall incidence of serious complications requiring additional medical treatment is estimated at <1 in  20,000 (0.02%)   -Incidence of an infection after a procedure is estimated at <1:50,000, and risk of a severe infection such as epidural abscess estimated at less than 1 in 500,000  -Risk of severe bleeding, epidural hematoma, is somewhere between 1 and 150,000-220,000  - Temporary numbness or short-term paralysis after injection is very rare.  At this time I have not been able to find a definitive number on reversible nerve problems after injection.  There have been 114 total claims of paralysis after epidural injection in the [place] over the past 30 years.  In context, there are about 9 million epidural injections  "performed in the [place] every year.  - Risk of death after procedure is so incredibly rare and cannot be estimated.  There are statistics about maternal mortality when epidurals are used in the obstetric setting, but these numbers are not relevant to interventional pain  -Risk of bone loss or osteoporosis from epidural steroid injections has not been shown to be an independent risk factor.  This was noted from research from the journal Pain Physician in 2012.  In our practice, we still want to be careful not to give too many steroids too often and avoid high annual total doses of steroids  -Inpatient see you have significant depression or nidia are other psychiatric comorbidities, there is a less than 1% chance of the spacings having mood problems after an epidural steroid injection.  We have to weigh that risk, which is somewhere between 0.01% and 0.1% with the risk of worsening of the psychiatric comorbidities because of depressive symptoms associated with chronic pain or severe pain.  There has been one case report of a person without any psychiatric history having what was called \"steroid nidia\" after having epidural steroid injection.  -With regards to blood glucose levels, we have long been concerned about hyperglycemia after giving steroids.  There has not been shown to be any association with increasing blood sugar or increasing risk of diabetes in patients who do not already have diabetes.  There is evidence that patients with diabetes may have increased blood sugar levels for 1 to 2 days after an epidural steroid injection, and this may be a  point increase, but it resolves within 24 hours.  There is no association with increasing hemoglobin A1c with epidural steroid injections which is reassuring regarding short or even long-term detrimental effects even in patients with diabetes  -Steroids given in the epidural space do not seem to affect the adrenal glands in the same way that steroids do " that are given intravenously or in muscular injections or an pill form to the GI tract.  Adrenal gland suppression from epidural steroids is shown to be quite rare, and temporary when it does occur.  -Risk of dural puncture, which is a leak of spinal fluid, is estimated somewhere between 0.1% and 1%.  Again this data includes epidural injections being placed in the obstetric setting as well as interventional pain setting, and it is the opinion of this practice that with the assistance of image guidance in a controlled setting that the risk of a dural puncture is lower than it would be as compared to using nonimage guided techniques placing an epidural in a woman in labor.      KRISTEN REPORT    As part of the patient's treatment plan, I am prescribing controlled substances. The patient has been made aware of appropriate use of such medications, including potential risk of somnolence, limited ability to drive and/or work safely, and the potential for dependence or overdose. It has also bee made clear that these medications are for use by this patient only, without concomitant use of alcohol or other substances unless prescribed.     Patient has completed prescribing agreement detailing terms of continued prescribing of controlled substances, including monitoring KRISTEN reports, urine drug screening, and pill counts if necessary. The patient is aware that inappropriate use will results in cessation of prescribing such medications.    KRISTEN report has been reviewed and scanned into the patient's chart.    As the clinician, I personally reviewed the KRISTEN from 10/12/2022 while the patient was in the office today.    History and physical exam exhibit continued safe and appropriate use of controlled substances.        Dictated utilizing Dragon dictation.     This document is intended for medical expert use only. Reading of this document by patients and/or patient's family without participating medical staff guidance may  result in misinterpretation and unintended morbidity.   Any interpretation of such data is the responsibility of the patient and/or family member responsible for the patient in concert with their primary or specialist providers, not to be left for sources of online searches such as mBlox, [employer] or similar queries. Relying on these approaches to knowledge may result in misinterpretation, misguided goals of care and even death should patients or family members try recommendations outside of the realm of professional medical care in a supervised way.

## 2022-10-14 ENCOUNTER — TRANSCRIBE ORDERS (OUTPATIENT)
Dept: SURGERY | Facility: SURGERY CENTER | Age: 72
End: 2022-10-14

## 2022-10-14 DIAGNOSIS — Z41.9 SURGERY, ELECTIVE: Primary | ICD-10-CM

## 2022-11-02 NOTE — DISCHARGE INSTRUCTIONS
[facility] [place] [facility] - Post-procedure Instructions          --  While there are no absolute restrictions, it is recommended that you do not perform strenuous activity today. In the morning, you may resume your level of activity as before your block.    --  If you have a band-aid at your injection site, please remove it later today. Observe the area for any redness, swelling, pus-like drainage, or a temperature over 101°. If any of these symptoms occur, please call your doctor at [phone]. If after office hours, leave a message and the on-call provider will return your call.    --  Ice may be applied to your injection site. It is recommended you avoid direct heat (heating pad; hot tub) for 1-2 days.    --  Call [facility] [place] at [phone] if you experience persistent headache, persistent bleeding from the injection site, or severe pain not relieved by heat or oral medication.    --  Do not make important decisions today.    --  Due to the effects of the block and/or the I.V. Sedation, DO NOT drive or operate hazardous machinery for 12 hours.  Local anesthetics may cause numbness after procedure and precautions must be taken with regards to operating equipment as well as with walking, even if ambulating with assistance of another person or with an assistive device.    --  Do not drink alcohol for 12 hours.    -- You may return to work tomorrow, or as directed by your referring doctor.    --  Occasionally you may notice a slight increase in your pain after the procedure. This should start to improve within the next 24-48 hours. Radiofrequency ablation procedure pain may last 3-4 weeks.    --  It may take as long as 3-4 days before you notice a gradual improvement in your pain and/or other symptoms.    -- You may continue to take your prescribed pain medication as needed.    --  Some normal possible side effects of steroid use could include fluid retention, increased blood sugar, dull headache,  increased sweating, increased appetite, mood swings and flushing.    --  Diabetics are recommended to watch their blood glucose level closely for 24-48 hours after the injection.    --  Must stay in PACU for 20 min upon arrival and prove no leg weakness before being discharged.    --  IN THE EVENT OF A LIFE THREATENING EMERGENCY, (CHEST PAIN, BREATHING DIFFICULTIES, PARALYSIS…) YOU SHOULD GO TO YOUR NEAREST EMERGENCY ROOM.    --  You should be contacted by our office within 2-3 days to schedule follow up or next appointment date.  If not contacted within 7 days, please call the office at [phone]

## 2022-11-07 ENCOUNTER — PATIENT MESSAGE (OUTPATIENT)
Dept: NEUROSURGERY | Facility: CLINIC | Age: 72
End: 2022-11-07

## 2022-11-07 ENCOUNTER — HOSPITAL ENCOUNTER (OUTPATIENT)
Facility: SURGERY CENTER | Age: 72
Setting detail: HOSPITAL OUTPATIENT SURGERY
Discharge: HOME | End: 2022-11-07
Attending: ANESTHESIOLOGY | Admitting: ANESTHESIOLOGY

## 2022-11-07 ENCOUNTER — HOSPITAL ENCOUNTER (OUTPATIENT)
Dept: GENERAL RADIOLOGY | Facility: SURGERY CENTER | Age: 72
Setting detail: HOSPITAL OUTPATIENT SURGERY
End: 2022-11-07

## 2022-11-07 VITALS
TEMPERATURE: 97.5 F | BODY MASS INDEX: 37.99 KG/M2 | RESPIRATION RATE: 16 BRPM | HEIGHT: 65 IN | SYSTOLIC BLOOD PRESSURE: 107 MMHG | DIASTOLIC BLOOD PRESSURE: 84 MMHG | HEART RATE: 75 BPM | OXYGEN SATURATION: 100 % | WEIGHT: 228 LBS

## 2022-11-07 DIAGNOSIS — Z41.9 SURGERY, ELECTIVE: ICD-10-CM

## 2022-11-07 PROCEDURE — 25010000002 FENTANYL CITRATE (PF) 50 MCG/ML SOLUTION: Performed by: ANESTHESIOLOGY

## 2022-11-07 PROCEDURE — 77002 NEEDLE LOCALIZATION BY XRAY: CPT

## 2022-11-07 PROCEDURE — 25010000002 MIDAZOLAM PER 1 MG: Performed by: ANESTHESIOLOGY

## 2022-11-07 PROCEDURE — 25010000002 IOPAMIDOL 61 % SOLUTION 30 ML VIAL: Performed by: ANESTHESIOLOGY

## 2022-11-07 PROCEDURE — 64484 NJX AA&/STRD TFRM EPI L/S EA: CPT | Performed by: ANESTHESIOLOGY

## 2022-11-07 PROCEDURE — 64483 NJX AA&/STRD TFRM EPI L/S 1: CPT | Performed by: ANESTHESIOLOGY

## 2022-11-07 PROCEDURE — 25010000002 DEXAMETHASONE SODIUM PHOSPHATE 100 MG/10ML SOLUTION 10 ML VIAL: Performed by: ANESTHESIOLOGY

## 2022-11-07 PROCEDURE — 76000 FLUOROSCOPY <1 HR PHYS/QHP: CPT

## 2022-11-07 RX ORDER — FENTANYL CITRATE 50 UG/ML
INJECTION, SOLUTION INTRAMUSCULAR; INTRAVENOUS AS NEEDED
Status: DISCONTINUED | OUTPATIENT
Start: 2022-11-07 | End: 2022-11-07 | Stop reason: HOSPADM

## 2022-11-07 RX ORDER — SODIUM CHLORIDE 0.9 % (FLUSH) 0.9 %
10 SYRINGE (ML) INJECTION AS NEEDED
Status: DISCONTINUED | OUTPATIENT
Start: 2022-11-07 | End: 2022-11-07 | Stop reason: HOSPADM

## 2022-11-07 RX ORDER — SODIUM CHLORIDE 0.9 % (FLUSH) 0.9 %
10 SYRINGE (ML) INJECTION EVERY 12 HOURS SCHEDULED
Status: DISCONTINUED | OUTPATIENT
Start: 2022-11-07 | End: 2022-11-07 | Stop reason: HOSPADM

## 2022-11-07 RX ORDER — MIDAZOLAM HYDROCHLORIDE 1 MG/ML
INJECTION INTRAMUSCULAR; INTRAVENOUS AS NEEDED
Status: DISCONTINUED | OUTPATIENT
Start: 2022-11-07 | End: 2022-11-07 | Stop reason: HOSPADM

## 2022-11-07 NOTE — OP NOTE
Lumbar Transforaminal Epidural Steroid Injection Right L4-5 and Right L5-S1 Levels  [facility]    PREOPERATIVE DIAGNOSIS:  Lumbar radicular pain, Lumbar Radiculopathy    POSTOPERATIVE DIAGNOSIS:  Same as preop diagnosis    PROCEDURE:    1. CPT 12550 --  Diagnostic & Therapeutic Transforaminal Epidural Steroid Injection at the L4 level, on the right   2. CPT 56380 --  Diagnostic & Therapeutic Transforaminal Epidural Steroid Injection at the L5 level, on the right     PRE-PROCEDURE DISCUSSION WITH PATIENT:    Risks and complications were discussed with the patient prior to starting the procedure and informed consent was obtained.  We discussed various topics including but not limited to bleeding, infection, injury, nerve injury, paralysis, coma, death, postprocedural painful flare-up, postprocedural site soreness, and a lack of pain relief.  We discussed the diagnostic aspect of transforaminal epidural / selective nerve root blockade.    SURGEON:  Jonelle Abraham MD    SEDATION:  Versed 1mg & Fentanyl 50 mcg IV  ANESTHETIC:  0.5% bupivacaine  STEROID:  15mg dexamethasone    DESCRIPTON OF PROCEDURE:  After obtaining informed consent, an I.V. was started in the preoperative area. The patient taken to the operating room and was placed in the prone position with a pillow under the abdomen.  All pressure points were well padded.  EKG, blood pressure, and pulse oximeter were monitored.  The lumbar area was prepped with Chloraprep and draped in a sterile fashion.     The AP fluoroscopic image was used to visualize the L4 vertebral body.  The superior endplate was squared off radiographically.  The image was then obliqued towards the patient's right side to maximize visualization of the pedicle. The skin and subcutaneous tissue at the 6 o'clock position of the pedicle was anesthetized with 1% lidocaine. A 22-gauge spinal needle was then advanced percutaneously through the anesthetized skin tract under  fluoroscopic guidance in AP and lateral views until the needle tip lie in the kevin-lateral aspect of the epidural space.  After negative aspiration of the needle for blood or CSF, a volume of 1.5 mL of Isovue was injected producing good epidural spread with no evidence of loculation, vascular run-off, or intrathecal spread. Subsequently, a volume of 3 mL of injectate was administered without resistance.  The needle was removed intact.  Vital signs were stable throughout.      The same procedure was then performed at the right L5-S1 foramen in the exact same fashion.      The total volume injected consisted of  15 mg of dexamethasone with 1 mL of 0.5% bupivacaine and preservative-free normal saline.       ESTIMATED BLOOD LOSS:  <5 mL  SPECIMENS:  none    COMPLICATIONS:   No complications were noted., There was no indication of vascular uptake on live injection of contrast dye. and There was no indication of intrathecal uptake on live injection of contrast dye.    TOLERANCE & DISCHARGE CONDITION:    The patient tolerated the procedure well.  The patient was transported to the recovery area without difficulties.  The patient was discharged to home under the care of family in stable and satisfactory condition.    PLAN OF CARE:  1. The patient was given our standard instruction sheet.  2. The patient will Return to clinic 4-6 wks.  3. The patient will resume all medications as per the medication reconciliation sheet.

## 2022-11-19 DIAGNOSIS — M25.561 PAIN AND SWELLING OF RIGHT KNEE: ICD-10-CM

## 2022-11-19 DIAGNOSIS — M25.461 PAIN AND SWELLING OF RIGHT KNEE: ICD-10-CM

## 2022-11-21 ENCOUNTER — OFFICE VISIT (OUTPATIENT)
Dept: FAMILY MEDICINE CLINIC | Facility: CLINIC | Age: 72
End: 2022-11-21

## 2022-11-21 VITALS
BODY MASS INDEX: 38.15 KG/M2 | HEIGHT: 65 IN | OXYGEN SATURATION: 96 % | HEART RATE: 74 BPM | DIASTOLIC BLOOD PRESSURE: 58 MMHG | WEIGHT: 229 LBS | SYSTOLIC BLOOD PRESSURE: 110 MMHG

## 2022-11-21 DIAGNOSIS — K21.9 GASTROESOPHAGEAL REFLUX DISEASE WITHOUT ESOPHAGITIS: ICD-10-CM

## 2022-11-21 DIAGNOSIS — F32.9 MAJOR DEPRESSIVE DISORDER WITH CURRENT ACTIVE EPISODE, UNSPECIFIED DEPRESSION EPISODE SEVERITY, UNSPECIFIED WHETHER RECURRENT: ICD-10-CM

## 2022-11-21 DIAGNOSIS — I10 ESSENTIAL HYPERTENSION: Primary | ICD-10-CM

## 2022-11-21 DIAGNOSIS — Z11.59 NEED FOR HEPATITIS C SCREENING TEST: ICD-10-CM

## 2022-11-21 PROCEDURE — 99214 OFFICE O/P EST MOD 30 MIN: CPT | Performed by: NURSE PRACTITIONER

## 2022-11-21 NOTE — PROGRESS NOTES
"Chief Complaint  Hypertension, Heartburn, and Depression    Subjective            Samina Marin presents to [facility]  History of Present Illness  Pt is a 6 mo f/u for HTN, depression, and GERD.  PT states she was started on Neurontin by [facility] however she states she is no longer taking this due to feeling \"out of it and like a zombie.\"     Pt is followed by pain management in [place].     Pt is followed by [facility] for Cardiology.     Pt is due labs in 3 mo. Orders placed.        Past Medical History:   Diagnosis Date   • Allergic rhinitis    • Chronic bronchitis (HCC)    • Hypertension    • Perianal abscess    • Sinus bradycardia        Allergies   Allergen Reactions   • Meperidine Rash        Past Surgical History:   Procedure Laterality Date   • BACK SURGERY     • CARDIAC CATHETERIZATION N/A 12/9/2021    Procedure: Left Heart Cath;  Surgeon: Marily Maya MD;  Location: [facility] CATH INVASIVE LOCATION;  Service: Cardiology;  Laterality: N/A;   • CARDIAC CATHETERIZATION N/A 12/9/2021    Procedure: Coronary angiography;  Surgeon: Marily Maya MD;  Location: [facility] CATH INVASIVE LOCATION;  Service: Cardiology;  Laterality: N/A;   • CARDIAC CATHETERIZATION N/A 12/9/2021    Procedure: Left ventriculography;  Surgeon: Marily Maya MD;  Location[facility] CATH INVASIVE LOC[facility];  Service: Cardiology;  Laterality: N/A;   • CARDIAC ELECTROPHYSIOLOGY STUDY AND ABLATION     • EPIDURAL Right 11/7/2022    Procedure: LUMBAR/SACRAL TRANSFORAMINAL EPIDURAL RIGHT L4, L5;  Surgeon: Jonelle Abraham MD;  Location: [facility] OR;  Service: Pain Management;  Laterality: Right;   • HYSTERECTOMY     • ROTATOR CUFF REPAIR Right    • TENDON REPAIR Right     SHOULDER        Social History     Tobacco Use   • Smoking status: Never   • Smokeless tobacco: Never   • Tobacco comments:     CAFFEINE USE   Substance Use Topics   • Alcohol use: Yes     Comment: RARE       Family " "History   Problem Relation Age of Onset   • Heart disease Sister    • Ovarian cancer Sister    • Liver cancer Sister    • Heart disease Brother    • Hypertension Sister    • Pancreatic cancer Sister         Current Outpatient Medications on File Prior to Visit   Medication Sig   • amLODIPine (NORVASC) 10 MG tablet    • aspirin 81 MG EC tablet Take 81 mg by mouth Daily.   • cetirizine (zyrTEC) 10 MG tablet Take 10 mg by mouth Daily.   • diclofenac (VOLTAREN) 50 MG EC tablet TAKE 1 TABLET BY MOUTH TWICE DAILY AS NEEDED FOR JOINT PAIN   • DULoxetine (CYMBALTA) 60 MG capsule TAKE 1 CAPSULE DAILY   • esomeprazole (nexIUM) 40 MG capsule Take 1 capsule by mouth Daily.   • metoprolol tartrate (LOPRESSOR) 25 MG tablet TAKE 1 TABLET TWICE A DAY   • tolterodine (DETROL) 2 MG tablet TAKE 1 TABLET TWICE A DAY   • [DISCONTINUED] diclofenac (VOLTAREN) 50 MG EC tablet Take 1 tablet by mouth 2 (Two) Times a Day As Needed (joint pain).   • [DISCONTINUED] gabapentin (NEURONTIN) 100 MG capsule Take 1 capsule by mouth 3 (Three) Times a Day.     No current facility-administered medications on file prior to visit.       Health Maintenance Due   Topic Date Due   • HEPATITIS C SCREENING  Never done       Objective     /58   Pulse 74   Ht 165.1 cm (65\")   Wt 104 kg (229 lb)   SpO2 96%   BMI 38.11 kg/m²       Physical Exam  Constitutional:       General: She is not in acute distress.     Appearance: Normal appearance. She is not ill-appearing.   HENT:      Head: Normocephalic and atraumatic.   Cardiovascular:      Rate and Rhythm: Normal rate and regular rhythm.      Pulses: Normal pulses.      Heart sounds: Normal heart sounds. No murmur heard.  Pulmonary:      Effort: Pulmonary effort is normal. No respiratory distress.      Breath sounds: Normal breath sounds.   Chest:      Chest wall: No tenderness.   Abdominal:      General: There is no distension.      Palpations: There is no mass.      Tenderness: There is no abdominal " tenderness. There is no guarding.   Musculoskeletal:         General: No swelling or tenderness. Normal range of motion.      Cervical back: Normal range of motion and neck supple.   Skin:     General: Skin is warm and dry.      Findings: No rash.   Neurological:      General: No focal deficit present.      Mental Status: She is alert and oriented to person, place, and time. Mental status is at baseline.      Gait: Gait normal.   Psychiatric:         Mood and Affect: Mood normal.         Behavior: Behavior normal.         Thought Content: Thought content normal.         Judgment: Judgment normal.           Result Review :                           Assessment and Plan        Diagnoses and all orders for this visit:    1. Essential hypertension (Primary)  Comments:  stable on norvasc 10mg and metoprolol 25mg, continue, continue f/u with Cardiology for mgmt   Orders:  -     CBC w AUTO Differential; Future  -     Comprehensive metabolic panel; Future  -     Lipid panel; Future    2. Need for hepatitis C screening test  -     Hepatitis panel, acute; Future    3. Major depressive disorder with current active episode, unspecified depression episode severity, unspecified whether recurrent  Comments:  stable on cymbalta 60mg, continue    4. Gastroesophageal reflux disease without esophagitis  Comments:  stable on nexium 40mg, continue              Follow Up     Return in about 6 months (around 5/21/2023).    Patient was given instructions and counseling regarding her condition or for health maintenance advice. Please see specific information pulled into the AVS if appropriate.     Samina GONZALEZ Tomas  reports that she has never smoked. She has never used smokeless tobacco..

## 2022-12-01 ENCOUNTER — TELEPHONE (OUTPATIENT)
Dept: FAMILY MEDICINE CLINIC | Facility: CLINIC | Age: 72
End: 2022-12-01

## 2022-12-01 NOTE — TELEPHONE ENCOUNTER
Caller: Samina Marin    Relationship: Self    Best call back number: [phone]    What is the best time to reach you: ANYTIME    Who are you requesting to speak with (clinical staff, provider,  specific staff member): CLINICAL    What was the call regarding: PATIENT IS CHANGING PHARMACY DUE TO INSURANCE PURPOSES. SHE NOW USES [facility].     Do you require a callback: IF NEEDED

## 2022-12-05 ENCOUNTER — HOSPITAL ENCOUNTER (OUTPATIENT)
Dept: GENERAL RADIOLOGY | Facility: HOSPITAL | Age: 72
Discharge: HOME | End: 2022-12-05
Admitting: PHYSICIAN ASSISTANT

## 2022-12-05 ENCOUNTER — PREP FOR SURGERY (OUTPATIENT)
Dept: SURGERY | Facility: SURGERY CENTER | Age: 72
End: 2022-12-05

## 2022-12-05 ENCOUNTER — OFFICE VISIT (OUTPATIENT)
Dept: PAIN MEDICINE | Facility: CLINIC | Age: 72
End: 2022-12-05

## 2022-12-05 VITALS
OXYGEN SATURATION: 95 % | HEART RATE: 79 BPM | WEIGHT: 228 LBS | HEIGHT: 65 IN | SYSTOLIC BLOOD PRESSURE: 142 MMHG | RESPIRATION RATE: 20 BRPM | DIASTOLIC BLOOD PRESSURE: 88 MMHG | BODY MASS INDEX: 37.99 KG/M2 | TEMPERATURE: 98.4 F

## 2022-12-05 DIAGNOSIS — M54.6 THORACIC SPINE PAIN: ICD-10-CM

## 2022-12-05 DIAGNOSIS — M51.36 DDD (DEGENERATIVE DISC DISEASE), LUMBAR: ICD-10-CM

## 2022-12-05 DIAGNOSIS — M47.816 LUMBAR FACET ARTHROPATHY: Primary | ICD-10-CM

## 2022-12-05 PROCEDURE — 72072 X-RAY EXAM THORAC SPINE 3VWS: CPT

## 2022-12-05 PROCEDURE — 99214 OFFICE O/P EST MOD 30 MIN: CPT | Performed by: PHYSICIAN ASSISTANT

## 2022-12-05 RX ORDER — SODIUM CHLORIDE 0.9 % (FLUSH) 0.9 %
10 SYRINGE (ML) INJECTION EVERY 12 HOURS SCHEDULED
Status: CANCELLED | OUTPATIENT
Start: 2022-12-05

## 2022-12-05 RX ORDER — SODIUM CHLORIDE 0.9 % (FLUSH) 0.9 %
10 SYRINGE (ML) INJECTION AS NEEDED
Status: CANCELLED | OUTPATIENT
Start: 2022-12-05

## 2022-12-05 NOTE — PROGRESS NOTES
CHIEF COMPLAINT  F/u back pain. Pt had LUMBAR/SACRAL TRANSFORAMINAL EPIDURAL RIGHT L4, L5 and sts receiving 90% ongoing relief in right leg, however no relief in back and right hip pain.     Subjective   Samina Marin is a 72 y.o. female  who presents for follow-up.  She has a history of low back and right lower extremity pain.  On 11/7/2022 patient underwent #1 diagnostic right L4, L5 TFESI with greater than 90% reduction of pain which is ongoing within the right lower extremity as of today.  She continues to note significant pain in a bandlike distribution across the lumbosacral spine referred into the bilateral hips, right greater than left without lower extremity complaints on today.  Patient finds that her pain is significantly aggravated with any type of lumbar facet loading maneuvers.    Gabapentin trial of 100 mg was discontinued by her PCP Jonelle BAZAN due to significant daytime somnolence.    Pain today 7/10 VAS in severity.      Back Pain  This is a chronic problem. The current episode started more than 1 year ago. The problem occurs constantly. The problem has been gradually worsening since onset. The pain is present in the lumbar spine. The quality of the pain is described as aching and burning. The pain does not radiate. The pain is at a severity of 7/10. The pain is moderate. The pain is worse during the day. The symptoms are aggravated by bending, position, standing and twisting. Stiffness is present in the morning. Associated symptoms include weakness (right leg). Pertinent negatives include no chest pain, fever, headaches or numbness.        PEG Assessment   What number best describes your pain on average in the past week?7  What number best describes how, during the past week, pain has interfered with your enjoyment of life?7  What number best describes how, during the past week, pain has interfered with your general activity?  7    Review of Pertinent Medical Data ---    MRI EXAMINATION OF  THE LUMBAR SPINE WITH AND WITHOUT CONTRAST     HISTORY: Back pain, right radiculopathy. Previous lumbar surgery.     COMPARISON: None.     FINDINGS: The alignment of the lumbar spine is within normal limits.  Mild levoscoliosis of the lumbar spine is appreciated with the apex at  the level of L2-L3. The conus is at L1 and the caudal aspect of the  spinal cord appears unremarkable. The hemangioma involving the posterior  lateral aspect of L1 is appreciated to the right measuring approximately  2.3 cm in maximum transverse dimension.     T12-L1: A mild broad-based disc osteophyte complex is present with no  evidence of herniation.     L1-L2: A mild broad-based disc osteophyte complex is present resulting  in mild flattening of the ventral surface of the thecal sac.     L2-L3: A broad-based disc osteophyte complex is present resulting in  mild flattening of the ventral surface of the thecal sac.  Mild facet  degenerative disease is present bilaterally. Mild foraminal stenosis is  present bilaterally secondary to extension of a small disc osteophyte  complex into the neural foramen.     L3-L4: A mild broad-based disc osteophyte complex is present as well as  mild facet degenerative disease bilaterally. The disc osteophyte complex  contributes to mild foraminal stenosis bilaterally, slightly more  prominent on the right where disc material approaches the right L3 nerve  as it exits the neural foramen.     L4-L5: A broad-based disc osteophyte complex is present which results in  mild canal stenosis and contributes to mild and moderate lateral recess  narrowing on the right and left respectively. A small annular tear is  present posterior laterally bilaterally. Mild foraminal stenosis is  present bilaterally, slightly more prominent on the left secondary to  loss of disc height and extension of a disc osteophyte complex into the  neural foramen.     L5-S1: Transitional anatomy is appreciated with partial sacralization  of  L5. Mild facet degenerative disease is present bilaterally. There is no  evidence of disc bulge or herniation.     After contrast administration there was mild enhancement involving the  posterior aspect of the discs at L3-L4 and L4-L5.     IMPRESSION:  There is no evidence of a focal disc herniation. Multilevel  degenerative disease involving the lumbar spine is noted as described in  detail above including multilevel facet degenerative disease and  broad-based disc osteophyte complexes. This is most prominent at L3-L4  and L4-L5. At L3-L4 a disc osteophyte complex approaches the right L3  nerve as it exits the neural foramen. It does not definitively involve  the nerve. At L4-L5 there is mild canal stenosis and lateral recess  narrowing bilaterally, slightly more prominent on the left. See above.     This report was finalized on 9/27/2022 7:34 AM by Dr. Lupillo Monge M.D.    The following portions of the patient's history were reviewed and updated as appropriate: allergies, current medications, past family history, past medical history, past social history, past surgical history and problem list.    Review of Systems   Constitutional: Positive for fatigue (occ). Negative for activity change, fever and unexpected weight change.   HENT: Negative for congestion.    Eyes: Negative for visual disturbance.   Respiratory: Positive for shortness of breath (occ ). Negative for cough.    Cardiovascular: Negative for chest pain.   Gastrointestinal: Negative for constipation and diarrhea.   Genitourinary: Negative for difficulty urinating.   Musculoskeletal: Positive for back pain.   Neurological: Positive for weakness (right leg). Negative for dizziness, light-headedness, numbness and headaches.   Psychiatric/Behavioral: Positive for sleep disturbance (occ). Negative for agitation and suicidal ideas. The patient is not nervous/anxious.      I have reviewed and confirmed the accuracy of the ROS as documented by the  "MA/FAUSTINO/JERONIMO Rodriguez    Vitals:    12/05/22 1409   BP: 142/88   Pulse: 79   Resp: 20   Temp: 98.4 °F (36.9 °C)   SpO2: 95%   Weight: 103 kg (228 lb)   Height: 165.1 cm (65\")   PainSc:   7   PainLoc: Back         Objective   Physical Exam  Vitals and nursing note reviewed.   Constitutional:       Appearance: Normal appearance.   HENT:      Head: Normocephalic.   Pulmonary:      Effort: Pulmonary effort is normal.   Musculoskeletal:      Lumbar back: Spasms and tenderness (Pain to palpation within the overlying bilateral lumbar facet joint spaces) present. Decreased range of motion (Pain is increased with lumbar extension and lateral bending).   Skin:     General: Skin is warm and dry.   Neurological:      General: No focal deficit present.      Mental Status: She is alert and oriented to person, place, and time.      Cranial Nerves: Cranial nerves 2-12 are intact.      Sensory: Sensation is intact.      Motor: Motor function is intact.      Gait: Gait is intact.   Psychiatric:         Mood and Affect: Mood normal.         Behavior: Behavior normal.         Thought Content: Thought content normal.         Judgment: Judgment normal.       Assessment & Plan   Diagnoses and all orders for this visit:    1. Lumbar facet arthropathy (Primary)    2. DDD (degenerative disc disease), lumbar    3. Thoracic spine pain  -     XR Spine Thoracic 3 View; Future        --- I will have the patient return for #1 diagnostic bilateral L3-5 MBB with plan to progress to RFA assuming continued favorable response  --- RTC in 1-2 weeks after injective therapy  --- Patient will proceed with thoracic spine x-rays for persistent thoracic spine pain which has been chronic over the past year       Diagnostic Facet Joint Procedure:   MBB     The first diagnostic facet joint procedure is considered medically reasonable and necessary for the diagnosis and treatment of chronic pain for this patient due to the patient meeting all of the " following criteria:    - 1. Moderate to severe chronic neck or low back pain, predominantly axial, that causes functional deficit measured on pain or disability scale.  - 2. Pain present for minimum of 3 months with documented failure to respond to noninvasive conservative management (as tolerated)  - 3. Absence of untreated radiculopathy or neurogenic claudication (except for radiculopathy caused by facet joint synovial cyst)  - 4. There is no non-facet pathology per clinical assessment or radiology studies that could explain the source of the patient’s pain, including but not limited to fracture, tumor, infection, or significant deformity.    The confirmatory diagnostic facet joint procedure is considered medically reasonable and necessary for the diagnosis and treatment of chronic pain for this patient due to the patient meeting all of the following criteria:    - 1. Moderate to severe chronic neck or low back pain, predominantly axial, that causes functional deficit measured on pain or disability scale.  - 2. Pain present for minimum of 3 months with documented failure to respond to noninvasive conservative management (as tolerated)  - 3. Absence of untreated radiculopathy or neurogenic claudication (except for radiculopathy caused by facet joint synovial cyst)  - 4. There is no non-facet pathology per clinical assessment or radiology studies that could explain the source of the patient’s pain, including but not limited to fracture, tumor, infection, or significant deformity.    The patient has also shown a consistent positive response of at least 80% relief of primary (index) pain (with the duration of relief being consistent with the agent used).     Dictated utilizing Dragon dictation.       Patient remained masked during entire encounter. No cough present. I donned a mask and eye protection throughout entire visit. Prior to donning mask and eye protection, hand hygiene was performed, as well as when it was  isabella.  I was closer than 6 feet, but not for an extended period of time. No obvious exposure to any bodily fluids.

## 2022-12-05 NOTE — H&P (VIEW-ONLY)
CHIEF COMPLAINT  F/u back pain. Pt had LUMBAR/SACRAL TRANSFORAMINAL EPIDURAL RIGHT L4, L5 and sts receiving 90% ongoing relief in right leg, however no relief in back and right hip pain.     Subjective   Samina Marin is a 72 y.o. female  who presents for follow-up.  She has a history of low back and right lower extremity pain.  On 11/7/2022 patient underwent #1 diagnostic right L4, L5 TFESI with greater than 90% reduction of pain which is ongoing within the right lower extremity as of today.  She continues to note significant pain in a bandlike distribution across the lumbosacral spine referred into the bilateral hips, right greater than left without lower extremity complaints on today.  Patient finds that her pain is significantly aggravated with any type of lumbar facet loading maneuvers.    Gabapentin trial of 100 mg was discontinued by her PCP Jonelle BAZAN due to significant daytime somnolence.    Pain today 7/10 VAS in severity.      Back Pain  This is a chronic problem. The current episode started more than 1 year ago. The problem occurs constantly. The problem has been gradually worsening since onset. The pain is present in the lumbar spine. The quality of the pain is described as aching and burning. The pain does not radiate. The pain is at a severity of 7/10. The pain is moderate. The pain is worse during the day. The symptoms are aggravated by bending, position, standing and twisting. Stiffness is present in the morning. Associated symptoms include weakness (right leg). Pertinent negatives include no chest pain, fever, headaches or numbness.        PEG Assessment   What number best describes your pain on average in the past week?7  What number best describes how, during the past week, pain has interfered with your enjoyment of life?7  What number best describes how, during the past week, pain has interfered with your general activity?  7    Review of Pertinent Medical Data ---    MRI EXAMINATION OF  THE LUMBAR SPINE WITH AND WITHOUT CONTRAST     HISTORY: Back pain, right radiculopathy. Previous lumbar surgery.     COMPARISON: None.     FINDINGS: The alignment of the lumbar spine is within normal limits.  Mild levoscoliosis of the lumbar spine is appreciated with the apex at  the level of L2-L3. The conus is at L1 and the caudal aspect of the  spinal cord appears unremarkable. The hemangioma involving the posterior  lateral aspect of L1 is appreciated to the right measuring approximately  2.3 cm in maximum transverse dimension.     T12-L1: A mild broad-based disc osteophyte complex is present with no  evidence of herniation.     L1-L2: A mild broad-based disc osteophyte complex is present resulting  in mild flattening of the ventral surface of the thecal sac.     L2-L3: A broad-based disc osteophyte complex is present resulting in  mild flattening of the ventral surface of the thecal sac.  Mild facet  degenerative disease is present bilaterally. Mild foraminal stenosis is  present bilaterally secondary to extension of a small disc osteophyte  complex into the neural foramen.     L3-L4: A mild broad-based disc osteophyte complex is present as well as  mild facet degenerative disease bilaterally. The disc osteophyte complex  contributes to mild foraminal stenosis bilaterally, slightly more  prominent on the right where disc material approaches the right L3 nerve  as it exits the neural foramen.     L4-L5: A broad-based disc osteophyte complex is present which results in  mild canal stenosis and contributes to mild and moderate lateral recess  narrowing on the right and left respectively. A small annular tear is  present posterior laterally bilaterally. Mild foraminal stenosis is  present bilaterally, slightly more prominent on the left secondary to  loss of disc height and extension of a disc osteophyte complex into the  neural foramen.     L5-S1: Transitional anatomy is appreciated with partial sacralization  of  L5. Mild facet degenerative disease is present bilaterally. There is no  evidence of disc bulge or herniation.     After contrast administration there was mild enhancement involving the  posterior aspect of the discs at L3-L4 and L4-L5.     IMPRESSION:  There is no evidence of a focal disc herniation. Multilevel  degenerative disease involving the lumbar spine is noted as described in  detail above including multilevel facet degenerative disease and  broad-based disc osteophyte complexes. This is most prominent at L3-L4  and L4-L5. At L3-L4 a disc osteophyte complex approaches the right L3  nerve as it exits the neural foramen. It does not definitively involve  the nerve. At L4-L5 there is mild canal stenosis and lateral recess  narrowing bilaterally, slightly more prominent on the left. See above.     This report was finalized on 9/27/2022 7:34 AM by Dr. Lupillo Monge M.D.    The following portions of the patient's history were reviewed and updated as appropriate: allergies, current medications, past family history, past medical history, past social history, past surgical history and problem list.    Review of Systems   Constitutional: Positive for fatigue (occ). Negative for activity change, fever and unexpected weight change.   HENT: Negative for congestion.    Eyes: Negative for visual disturbance.   Respiratory: Positive for shortness of breath (occ ). Negative for cough.    Cardiovascular: Negative for chest pain.   Gastrointestinal: Negative for constipation and diarrhea.   Genitourinary: Negative for difficulty urinating.   Musculoskeletal: Positive for back pain.   Neurological: Positive for weakness (right leg). Negative for dizziness, light-headedness, numbness and headaches.   Psychiatric/Behavioral: Positive for sleep disturbance (occ). Negative for agitation and suicidal ideas. The patient is not nervous/anxious.      I have reviewed and confirmed the accuracy of the ROS as documented by the  MA/FAUSTINO/JERONIMO Rodriguez    Vitals:    12/05/22 1409   BP: 142/88   Pulse: 79   Resp: 20   Temp: 98.4 °F (36.9 °C)   SpO2: 95%   Weight: 103 kg (228 lb)   Height: 165.1 cm (65\")   PainSc:   7   PainLoc: Back         Objective   Physical Exam  Vitals and nursing note reviewed.   Constitutional:       Appearance: Normal appearance.   HENT:      Head: Normocephalic.   Pulmonary:      Effort: Pulmonary effort is normal.   Musculoskeletal:      Lumbar back: Spasms and tenderness (Pain to palpation within the overlying bilateral lumbar facet joint spaces) present. Decreased range of motion (Pain is increased with lumbar extension and lateral bending).   Skin:     General: Skin is warm and dry.   Neurological:      General: No focal deficit present.      Mental Status: She is alert and oriented to person, place, and time.      Cranial Nerves: Cranial nerves 2-12 are intact.      Sensory: Sensation is intact.      Motor: Motor function is intact.      Gait: Gait is intact.   Psychiatric:         Mood and Affect: Mood normal.         Behavior: Behavior normal.         Thought Content: Thought content normal.         Judgment: Judgment normal.       Assessment & Plan   Diagnoses and all orders for this visit:    1. Lumbar facet arthropathy (Primary)    2. DDD (degenerative disc disease), lumbar    3. Thoracic spine pain  -     XR Spine Thoracic 3 View; Future        --- I will have the patient return for #1 diagnostic bilateral L3-5 MBB with plan to progress to RFA assuming continued favorable response  --- RTC in 1-2 weeks after injective therapy  --- Patient will proceed with thoracic spine x-rays for persistent thoracic spine pain which has been chronic over the past year       Diagnostic Facet Joint Procedure:   MBB     The first diagnostic facet joint procedure is considered medically reasonable and necessary for the diagnosis and treatment of chronic pain for this patient due to the patient meeting all of the  following criteria:    - 1. Moderate to severe chronic neck or low back pain, predominantly axial, that causes functional deficit measured on pain or disability scale.  - 2. Pain present for minimum of 3 months with documented failure to respond to noninvasive conservative management (as tolerated)  - 3. Absence of untreated radiculopathy or neurogenic claudication (except for radiculopathy caused by facet joint synovial cyst)  - 4. There is no non-facet pathology per clinical assessment or radiology studies that could explain the source of the patient’s pain, including but not limited to fracture, tumor, infection, or significant deformity.    The confirmatory diagnostic facet joint procedure is considered medically reasonable and necessary for the diagnosis and treatment of chronic pain for this patient due to the patient meeting all of the following criteria:    - 1. Moderate to severe chronic neck or low back pain, predominantly axial, that causes functional deficit measured on pain or disability scale.  - 2. Pain present for minimum of 3 months with documented failure to respond to noninvasive conservative management (as tolerated)  - 3. Absence of untreated radiculopathy or neurogenic claudication (except for radiculopathy caused by facet joint synovial cyst)  - 4. There is no non-facet pathology per clinical assessment or radiology studies that could explain the source of the patient’s pain, including but not limited to fracture, tumor, infection, or significant deformity.    The patient has also shown a consistent positive response of at least 80% relief of primary (index) pain (with the duration of relief being consistent with the agent used).     Dictated utilizing Dragon dictation.       Patient remained masked during entire encounter. No cough present. I donned a mask and eye protection throughout entire visit. Prior to donning mask and eye protection, hand hygiene was performed, as well as when it was  isabella.  I was closer than 6 feet, but not for an extended period of time. No obvious exposure to any bodily fluids.

## 2022-12-08 ENCOUNTER — TELEPHONE (OUTPATIENT)
Dept: PAIN MEDICINE | Facility: CLINIC | Age: 72
End: 2022-12-08

## 2022-12-14 ENCOUNTER — TRANSCRIBE ORDERS (OUTPATIENT)
Dept: SURGERY | Facility: SURGERY CENTER | Age: 72
End: 2022-12-14

## 2022-12-14 DIAGNOSIS — Z41.9 SURGERY, ELECTIVE: Primary | ICD-10-CM

## 2022-12-21 ENCOUNTER — TRANSCRIBE ORDERS (OUTPATIENT)
Dept: SURGERY | Facility: SURGERY CENTER | Age: 72
End: 2022-12-21

## 2022-12-21 DIAGNOSIS — Z41.9 SURGERY, ELECTIVE: Primary | ICD-10-CM

## 2023-01-03 NOTE — DISCHARGE INSTRUCTIONS
[facility] - Post-procedure Instructions          --  While there are no absolute restrictions, it is recommended that you do not perform strenuous activity today. In the morning, you may resume your level of activity as before your block.    --  If you have a band-aid at your injection site, please remove it later today. Observe the area for any redness, swelling, pus-like drainage, or a temperature over 101°. If any of these symptoms occur, please call your doctor at [phone]. If after office hours, leave a message and the on-call provider will return your call.    --  Ice may be applied to your injection site. It is recommended you avoid direct heat (heating pad; hot tub) for 1-2 days.    --  Call [facility] at [phone] if you experience persistent headache, persistent bleeding from the injection site, or severe pain not relieved by heat or oral medication.    --  Do not make important decisions today.    --  Due to the effects of the block and/or the I.V. Sedation, DO NOT drive or operate hazardous machinery for 12 hours.  Local anesthetics may cause numbness after procedure and precautions must be taken with regards to operating equipment as well as with walking, even if ambulating with assistance of another person or with an assistive device.    --  Do not drink alcohol for 12 hours.    -- You may return to work tomorrow, or as directed by your referring doctor.    --  Occasionally you may notice a slight increase in your pain after the procedure. This should start to improve within the next 24-48 hours. Radiofrequency ablation procedure pain may last 3-4 weeks.    --  It may take as long as 3-4 days before you notice a gradual improvement in your pain and/or other symptoms.    -- You may continue to take your prescribed pain medication as needed.    --  Some normal possible side effects of steroid use could include fluid retention, increased blood sugar, dull headache,  increased sweating, increased appetite, mood swings and flushing.    --  Diabetics are recommended to watch their blood glucose level closely for 24-48 hours after the injection.    --  Must stay in PACU for 20 min upon arrival and prove no leg weakness before being discharged.    --  IN THE EVENT OF A LIFE THREATENING EMERGENCY, (CHEST PAIN, BREATHING DIFFICULTIES, PARALYSIS…) YOU SHOULD GO TO YOUR NEAREST EMERGENCY ROOM.    --  You should be contacted by our office within 2-3 days to schedule follow up or next appointment date.  If not contacted within 7 days, please call the office at [phone]    If you have even 1 hour of relief, these injections are considered successful.

## 2023-01-04 ENCOUNTER — HOSPITAL ENCOUNTER (OUTPATIENT)
Dept: GENERAL RADIOLOGY | Facility: SURGERY CENTER | Age: 73
Setting detail: HOSPITAL OUTPATIENT SURGERY
End: 2023-01-04
Payer: MEDICARE

## 2023-01-04 ENCOUNTER — HOSPITAL ENCOUNTER (OUTPATIENT)
Facility: SURGERY CENTER | Age: 73
Setting detail: HOSPITAL OUTPATIENT SURGERY
Discharge: HOME | End: 2023-01-04
Attending: ANESTHESIOLOGY | Admitting: ANESTHESIOLOGY
Payer: MEDICARE

## 2023-01-04 VITALS
DIASTOLIC BLOOD PRESSURE: 74 MMHG | BODY MASS INDEX: 41.64 KG/M2 | TEMPERATURE: 98.4 F | HEIGHT: 63 IN | OXYGEN SATURATION: 95 % | SYSTOLIC BLOOD PRESSURE: 124 MMHG | RESPIRATION RATE: 16 BRPM | WEIGHT: 235 LBS | HEART RATE: 75 BPM

## 2023-01-04 DIAGNOSIS — Z41.9 SURGERY, ELECTIVE: ICD-10-CM

## 2023-01-04 PROCEDURE — 76000 FLUOROSCOPY <1 HR PHYS/QHP: CPT

## 2023-01-04 PROCEDURE — 64493 INJ PARAVERT F JNT L/S 1 LEV: CPT | Performed by: ANESTHESIOLOGY

## 2023-01-04 PROCEDURE — 77002 NEEDLE LOCALIZATION BY XRAY: CPT

## 2023-01-04 PROCEDURE — 25010000002 MIDAZOLAM PER 1 MG: Performed by: ANESTHESIOLOGY

## 2023-01-04 PROCEDURE — 64494 INJ PARAVERT F JNT L/S 2 LEV: CPT | Performed by: ANESTHESIOLOGY

## 2023-01-04 RX ORDER — MIDAZOLAM HYDROCHLORIDE 1 MG/ML
INJECTION INTRAMUSCULAR; INTRAVENOUS AS NEEDED
Status: DISCONTINUED | OUTPATIENT
Start: 2023-01-04 | End: 2023-01-04 | Stop reason: HOSPADM

## 2023-01-04 RX ORDER — SODIUM CHLORIDE 0.9 % (FLUSH) 0.9 %
10 SYRINGE (ML) INJECTION AS NEEDED
Status: DISCONTINUED | OUTPATIENT
Start: 2023-01-04 | End: 2023-01-04 | Stop reason: HOSPADM

## 2023-01-04 RX ORDER — SODIUM CHLORIDE 0.9 % (FLUSH) 0.9 %
10 SYRINGE (ML) INJECTION EVERY 12 HOURS SCHEDULED
Status: DISCONTINUED | OUTPATIENT
Start: 2023-01-04 | End: 2023-01-04 | Stop reason: HOSPADM

## 2023-01-04 NOTE — OP NOTE
Lumbar Medial Branch Blockade at  Bilateral L4-S1  [facility]      PREOPERATIVE DIAGNOSIS:  Lumbar spondylosis without myelopathy    POSTOPERATIVE DIAGNOSIS:  Lumbar spondylosis without myelopathy    PROCEDURE:   Diagnostic Lumbar Medial Branch Nerve Blockades, with fluoroscopy:  at the L4, L5 transverse processes and the sacral alar groove)   1. 76046-81 -- Lumbar Facet blocks, 1st Level  2. [id number] -- Lumbar Facet blocks, 2nd  Level     PRE-PROCEDURE DISCUSSION WITH PATIENT:    Risks and complications were discussed with the patient prior to starting the procedure and informed consent was obtained.      SURGEON:  Jonelle Abraham MD    REASON FOR PROCEDURE:    The patient complains of pain that seems to have a significant axial component    SEDATION:  Versed 2mg IV  ANESTHETIC:  0.5% bupivacaine  STEROID:  None  TOTAL VOLUME OF SOLUTION:  6ml    DESCRIPTON OF PROCEDURE:  After obtaining informed consent, IV access was obtained in the preoperative area.   The patient was taken to the operating room.  The patient was placed in the prone position with a pillow under the abdomen. All pressure points were well padded.  EKG, blood pressure, and pulse oximeter were monitored.  The patient was monitored and sedated by the RN under my direction. The lumbosacral area was prepped with Chloraprep and draped in a sterile fashion.     AP fluoroscopic image was used to visualize the junction of the right S1 superior articular process with the sacral ala.  The skin and subcutaneous tissue over the area was anesthetized with 1% lidocaine.  A 22-gauge spinal needle was then advanced percutaneously through the anesthetized skin tract under fluoroscopic guidance in a coaxial view to contact periosteum.  After negative aspiration, a volume of 1 mL of the local anesthetic was injected without resistance.  The image was then optimized to maximize visualization of the junctions of the L4, L5 superior articular  processes with the transverse processes.  The skin and subcutaneous tissue over the areas was anesthetized with 1% lidocaine.  A 22-gauge spinal needle was then advanced percutaneously through the anesthetized skin tracts under fluoroscopic guidance in a coaxial view to contact periosteum at the target sites.  After negative aspiration, a volume of 1 mL of the local anesthetic  was injected without resistance at each of the target sites.      The same procedure was then performed on the contralateral side in the exact same fashion.        Onset of analgesia was noted.  Vital signs remained stable throughout.      ESTIMATED BLOOD LOSS:  <5 mL  SPECIMENS:  none    COMPLICATIONS:   No complications were noted.    TOLERANCE & DISCHARGE CONDITION:    The patient tolerated the procedure well.  The patient was transported to the recovery area without difficulties.  The patient was discharged to home under the care of family in stable and satisfactory condition.    Pre-procedure pain score: 6/10 at rest, 9/10 with activity  Post-procedure pain score: 0/10    PLAN OF CARE:  1. The patient was given our standard instruction sheet.  2. We discussed that Lumbar Medial Branch Blockade is a diagnostic procedure in consideration for radiofrequency ablation if two diagnostic procedures prove to be positive for significant benefit.  An alternative plan could be therapeutic lumbar branch blockades.  3. The patient is asked to keep an account of pain relief after the procedure today.  4. The patient will  Return to clinic 1-2 wks.  5. The patient will resume all medications as per the medication reconciliation sheet.

## 2023-04-10 ENCOUNTER — LAB (OUTPATIENT)
Dept: LAB | Facility: HOSPITAL | Age: 73
End: 2023-04-10
Payer: MEDICARE

## 2023-04-10 DIAGNOSIS — Z11.59 NEED FOR HEPATITIS C SCREENING TEST: ICD-10-CM

## 2023-04-10 DIAGNOSIS — I10 ESSENTIAL HYPERTENSION: ICD-10-CM

## 2023-04-10 LAB
ALBUMIN SERPL-MCNC: 3.8 G/DL (ref 3.5–5.2)
ALBUMIN/GLOB SERPL: 1.4 G/DL
ALP SERPL-CCNC: 96 U/L (ref 39–117)
ALT SERPL W P-5'-P-CCNC: 7 U/L (ref 1–33)
ANION GAP SERPL CALCULATED.3IONS-SCNC: 7.2 MMOL/L (ref 5–15)
AST SERPL-CCNC: 17 U/L (ref 1–32)
BASOPHILS # BLD AUTO: 0.05 10*3/MM3 (ref 0–0.2)
BASOPHILS NFR BLD AUTO: 1 % (ref 0–1.5)
BILIRUB SERPL-MCNC: 0.5 MG/DL (ref 0–1.2)
BUN SERPL-MCNC: 10 MG/DL (ref 8–23)
BUN/CREAT SERPL: 13.7 (ref 7–25)
CALCIUM SPEC-SCNC: 9.7 MG/DL (ref 8.6–10.5)
CHLORIDE SERPL-SCNC: 104 MMOL/L (ref 98–107)
CHOLEST SERPL-MCNC: 193 MG/DL (ref 0–200)
CO2 SERPL-SCNC: 29.8 MMOL/L (ref 22–29)
CREAT SERPL-MCNC: 0.73 MG/DL (ref 0.57–1)
DEPRECATED RDW RBC AUTO: 39.2 FL (ref 37–54)
EGFRCR SERPLBLD CKD-EPI 2021: 87.5 ML/MIN/1.73
EOSINOPHIL # BLD AUTO: 0.14 10*3/MM3 (ref 0–0.4)
EOSINOPHIL NFR BLD AUTO: 2.8 % (ref 0.3–6.2)
ERYTHROCYTE [DISTWIDTH] IN BLOOD BY AUTOMATED COUNT: 12.7 % (ref 12.3–15.4)
GLOBULIN UR ELPH-MCNC: 2.8 GM/DL
GLUCOSE SERPL-MCNC: 100 MG/DL (ref 65–99)
HAV IGM SERPL QL IA: NORMAL
HBV CORE IGM SERPL QL IA: NORMAL
HBV SURFACE AG SERPL QL IA: NORMAL
HCT VFR BLD AUTO: 40.5 % (ref 34–46.6)
HCV AB SER DONR QL: NORMAL
HDLC SERPL-MCNC: 65 MG/DL (ref 40–60)
HGB BLD-MCNC: 13.3 G/DL (ref 12–15.9)
IMM GRANULOCYTES # BLD AUTO: 0.01 10*3/MM3 (ref 0–0.05)
IMM GRANULOCYTES NFR BLD AUTO: 0.2 % (ref 0–0.5)
LDLC SERPL CALC-MCNC: 111 MG/DL (ref 0–100)
LDLC/HDLC SERPL: 1.69 {RATIO}
LYMPHOCYTES # BLD AUTO: 1.47 10*3/MM3 (ref 0.7–3.1)
LYMPHOCYTES NFR BLD AUTO: 29.2 % (ref 19.6–45.3)
MCH RBC QN AUTO: 27.7 PG (ref 26.6–33)
MCHC RBC AUTO-ENTMCNC: 32.8 G/DL (ref 31.5–35.7)
MCV RBC AUTO: 84.4 FL (ref 79–97)
MONOCYTES # BLD AUTO: 0.36 10*3/MM3 (ref 0.1–0.9)
MONOCYTES NFR BLD AUTO: 7.2 % (ref 5–12)
NEUTROPHILS NFR BLD AUTO: 3 10*3/MM3 (ref 1.7–7)
NEUTROPHILS NFR BLD AUTO: 59.6 % (ref 42.7–76)
NRBC BLD AUTO-RTO: 0 /100 WBC (ref 0–0.2)
PLATELET # BLD AUTO: 181 10*3/MM3 (ref 140–450)
PMV BLD AUTO: 10.3 FL (ref 6–12)
POTASSIUM SERPL-SCNC: 4.2 MMOL/L (ref 3.5–5.2)
PROT SERPL-MCNC: 6.6 G/DL (ref 6–8.5)
RBC # BLD AUTO: 4.8 10*6/MM3 (ref 3.77–5.28)
SODIUM SERPL-SCNC: 141 MMOL/L (ref 136–145)
TRIGL SERPL-MCNC: 92 MG/DL (ref 0–150)
VLDLC SERPL-MCNC: 17 MG/DL (ref 5–40)
WBC NRBC COR # BLD: 5.03 10*3/MM3 (ref 3.4–10.8)

## 2023-04-10 PROCEDURE — 36415 COLL VENOUS BLD VENIPUNCTURE: CPT

## 2023-04-10 PROCEDURE — 80061 LIPID PANEL: CPT

## 2023-04-10 PROCEDURE — 80053 COMPREHEN METABOLIC PANEL: CPT

## 2023-04-10 PROCEDURE — 80074 ACUTE HEPATITIS PANEL: CPT

## 2023-04-10 PROCEDURE — 85025 COMPLETE CBC W/AUTO DIFF WBC: CPT

## 2023-04-11 ENCOUNTER — TELEPHONE (OUTPATIENT)
Dept: FAMILY MEDICINE CLINIC | Facility: CLINIC | Age: 73
End: 2023-04-11

## 2023-04-11 DIAGNOSIS — F32.A DEPRESSION, UNSPECIFIED DEPRESSION TYPE: ICD-10-CM

## 2023-04-11 RX ORDER — DULOXETIN HYDROCHLORIDE 60 MG/1
60 CAPSULE, DELAYED RELEASE ORAL DAILY
Qty: 30 CAPSULE | Refills: 0 | Status: SHIPPED | OUTPATIENT
Start: 2023-04-11 | End: 2023-04-11 | Stop reason: SDUPTHER

## 2023-04-11 RX ORDER — DULOXETIN HYDROCHLORIDE 60 MG/1
60 CAPSULE, DELAYED RELEASE ORAL DAILY
Qty: 90 CAPSULE | Refills: 1 | Status: SHIPPED | OUTPATIENT
Start: 2023-04-11

## 2023-04-11 NOTE — TELEPHONE ENCOUNTER
Caller: Samina Marin    Relationship: Self    Best call back number: [phone]    What is the best time to reach you: ANY     Who are you requesting to speak with (clinical staff, provider,  specific staff member): CLINICAL     What was the call regarding: PATIENT WANTED TO KNOW IF THE OFFICE NEEDED ANY OF HER INFORMATION FOR HER [employer] SAVINGS CARD FOR HER PRESCRIPTION REFILL GOING TO [employer]. PLEASE ADVISE.     Do you require a callback: YES

## 2023-04-11 NOTE — TELEPHONE ENCOUNTER
LVM stating not needed at front, but if she would like it scanned into her chart she may bring to next appt and we will scan it.

## 2023-04-25 ENCOUNTER — TELEPHONE (OUTPATIENT)
Dept: FAMILY MEDICINE CLINIC | Facility: CLINIC | Age: 73
End: 2023-04-25
Payer: MEDICARE

## 2023-05-10 DIAGNOSIS — F32.A DEPRESSION, UNSPECIFIED DEPRESSION TYPE: ICD-10-CM

## 2023-05-10 RX ORDER — DULOXETIN HYDROCHLORIDE 60 MG/1
60 CAPSULE, DELAYED RELEASE ORAL DAILY
Qty: 30 CAPSULE | OUTPATIENT
Start: 2023-05-10

## 2023-05-15 ENCOUNTER — HOSPITAL ENCOUNTER (OUTPATIENT)
Dept: GENERAL RADIOLOGY | Facility: HOSPITAL | Age: 73
Discharge: HOME | End: 2023-05-15
Payer: MEDICARE

## 2023-05-15 ENCOUNTER — OFFICE VISIT (OUTPATIENT)
Dept: FAMILY MEDICINE CLINIC | Facility: CLINIC | Age: 73
End: 2023-05-15
Payer: MEDICARE

## 2023-05-15 VITALS
HEART RATE: 84 BPM | SYSTOLIC BLOOD PRESSURE: 123 MMHG | DIASTOLIC BLOOD PRESSURE: 51 MMHG | BODY MASS INDEX: 39.87 KG/M2 | WEIGHT: 225 LBS | HEIGHT: 63 IN

## 2023-05-15 DIAGNOSIS — M25.522 LEFT ELBOW PAIN: ICD-10-CM

## 2023-05-15 DIAGNOSIS — M25.512 ACUTE PAIN OF LEFT SHOULDER: Primary | ICD-10-CM

## 2023-05-15 DIAGNOSIS — M25.512 ACUTE PAIN OF LEFT SHOULDER: ICD-10-CM

## 2023-05-15 PROCEDURE — 73030 X-RAY EXAM OF SHOULDER: CPT

## 2023-05-15 PROCEDURE — 99213 OFFICE O/P EST LOW 20 MIN: CPT | Performed by: NURSE PRACTITIONER

## 2023-05-15 PROCEDURE — 73080 X-RAY EXAM OF ELBOW: CPT

## 2023-05-15 NOTE — PROGRESS NOTES
Chief Complaint  Arm Pain, Hand Pain, and Shoulder Pain    Subjective            Samina Marin presents to [facility]  History of Present Illness  Pt has c/o (L) arm/elbow, thumb, and shoulder pain. Pt states she was evaluated through work for this for 3 weeks and was told it was arthritis and she needed to retire.  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 Pt states this comes from overuse of hands and arms.  Pt has had x-rays of her thumb/hand and was told WNL. Pt has done PT also for 3 wks. Pt has not had elbow or shoulder imaging.  She is making her own appt to return to [facility] for evaluation of her thumb as she has seen them in the past and wishes to go back. She states she was told in the past by them she may need surgery in the future.  She has been wearing a brace on her thumb and hand.    Pt is due covid vaccine. Pt declines and understands the risks of not having.         Past Medical History:   Diagnosis Date   • Allergic rhinitis    • Chronic bronchitis    • Hypertension    • Perianal abscess    • Sinus bradycardia        Allergies   Allergen Reactions   • Meperidine Rash        Past Surgical History:   Procedure Laterality Date   • BACK SURGERY     • CARDIAC CATHETERIZATION N/A 12/9/2021    Procedure: Left Heart Cath;  Surgeon: Marily Maya MD;  Location:  [facility];  Service: Cardiology;  Laterality: N/A;   • CARDIAC CATHETERIZATION N/A 12/9/2021    Procedure: Coronary angiography;  Surgeon: Marily Maya MD;  Location:  [facility];  Service: Cardiology;  Laterality: N/A;   • CARDIAC CATHETERIZATION N/A 12/9/2021    Procedure: Left ventriculography;  Surgeon: Marily Maya MD;  Location:  [facility];  Service: Cardiology;  Laterality: N/A;   • CARDIAC ELECTROPHYSIOLOGY STUDY AND ABLATION     • EPIDURAL Right 11/7/2022    Procedure: LUMBAR/SACRAL TRANSFORAMINAL EPIDURAL RIGHT  "L4, L5;  Surgeon: Jonelle Abraham MD;  Location: [facility];  Service: Pain Management;  Laterality: Right;   • HYSTERECTOMY     • MEDIAL BRANCH BLOCK Bilateral 1/4/2023    Procedure: LUMBAR MEDIAL BRANCH BLOCK BILATERAL L3-L5 #1;  Surgeon: Jonelle Abraham MD;  Location: [facility];  Service: Pain Management;  Laterality: Bilateral;   • ROTATOR CUFF REPAIR Right    • TENDON REPAIR Right     SHOULDER        Social History     Tobacco Use   • Smoking status: Never   • Smokeless tobacco: Never   • Tobacco comments:     CAFFEINE USE   Substance Use Topics   • Alcohol use: Yes     Comment: RARE       Family History   Problem Relation Age of Onset   • Heart disease Sister    • Ovarian cancer Sister    • Liver cancer Sister    • Heart disease Brother    • Hypertension Sister    • Pancreatic cancer Sister         Current Outpatient Medications on File Prior to Visit   Medication Sig   • amLODIPine (NORVASC) 10 MG tablet    • aspirin 81 MG EC tablet Take 1 tablet by mouth Daily.   • cetirizine (zyrTEC) 10 MG tablet Take 1 tablet by mouth Daily.   • diclofenac (VOLTAREN) 50 MG EC tablet TAKE 1 TABLET BY MOUTH TWICE DAILY AS NEEDED FOR JOINT PAIN   • DULoxetine (CYMBALTA) 60 MG capsule Take 1 capsule by mouth Daily.   • esomeprazole (nexIUM) 40 MG capsule Take 1 capsule by mouth Daily.   • metoprolol tartrate (LOPRESSOR) 25 MG tablet TAKE 1 TABLET TWICE A DAY   • tolterodine (DETROL) 2 MG tablet TAKE 1 TABLET TWICE A DAY     No current facility-administered medications on file prior to visit.       There are no preventive care reminders to display for this patient.    Objective     /51   Pulse 84   Ht 160 cm (63\")   Wt 102 kg (225 lb)   BMI 39.86 kg/m²       Physical Exam  Constitutional:       General: She is not in acute distress.     Appearance: Normal appearance. She is not ill-appearing.   HENT:      Head: Normocephalic and atraumatic.   Cardiovascular:      Rate and Rhythm: Normal rate and regular " rhythm.      Heart sounds: Normal heart sounds. No murmur heard.  Pulmonary:      Effort: Pulmonary effort is normal. No respiratory distress.      Breath sounds: Normal breath sounds.   Chest:      Chest wall: No tenderness.   Abdominal:      General: There is no distension.      Palpations: There is no mass.      Tenderness: There is no abdominal tenderness. There is no guarding.   Musculoskeletal:         General: No swelling.      Left shoulder: Bony tenderness present. No swelling, deformity or effusion. Decreased range of motion. Normal strength.      Left elbow: No swelling, deformity or effusion. Normal range of motion. Tenderness present.      Cervical back: Normal range of motion and neck supple.      Comments: Tenderness in left shoulder and left elbow upon palpation and with ROM, limited ROM in left shoulder without pain.    Pt wearing a brace on her wrist and thumb.   Skin:     General: Skin is warm and dry.      Findings: No rash.   Neurological:      General: No focal deficit present.      Mental Status: She is alert and oriented to person, place, and time. Mental status is at baseline.      Gait: Gait normal.   Psychiatric:         Mood and Affect: Mood normal.         Behavior: Behavior normal.         Thought Content: Thought content normal.         Judgment: Judgment normal.           Result Review :                           Assessment and Plan        Diagnoses and all orders for this visit:    1. Acute pain of left shoulder (Primary)  -     XR Shoulder 2+ View Left; Future    2. Left elbow pain  -     XR Elbow 3+ View Left; Future              Follow Up     Return if symptoms worsen or fail to improve.    Patient was given instructions and counseling regarding her condition or for health maintenance advice. Please see specific information pulled into the AVS if appropriate.     Samina CARLOS Marin  reports that she has never smoked. She has never used smokeless tobacco..

## 2023-05-16 DIAGNOSIS — M25.512 ACUTE PAIN OF LEFT SHOULDER: Primary | ICD-10-CM

## 2023-06-05 ENCOUNTER — OFFICE VISIT (OUTPATIENT)
Dept: FAMILY MEDICINE CLINIC | Facility: CLINIC | Age: 73
End: 2023-06-05
Payer: MEDICARE

## 2023-06-05 VITALS
BODY MASS INDEX: 39.16 KG/M2 | OXYGEN SATURATION: 97 % | WEIGHT: 221 LBS | HEART RATE: 76 BPM | SYSTOLIC BLOOD PRESSURE: 113 MMHG | DIASTOLIC BLOOD PRESSURE: 42 MMHG | HEIGHT: 63 IN

## 2023-06-05 DIAGNOSIS — K21.9 GASTROESOPHAGEAL REFLUX DISEASE WITHOUT ESOPHAGITIS: ICD-10-CM

## 2023-06-05 DIAGNOSIS — M25.50 ARTHRALGIA, UNSPECIFIED JOINT: Primary | ICD-10-CM

## 2023-06-05 DIAGNOSIS — I10 ESSENTIAL HYPERTENSION: ICD-10-CM

## 2023-06-05 DIAGNOSIS — Z12.31 ENCOUNTER FOR SCREENING MAMMOGRAM FOR MALIGNANT NEOPLASM OF BREAST: ICD-10-CM

## 2023-06-05 RX ORDER — AMLODIPINE BESYLATE 10 MG/1
10 TABLET ORAL DAILY
Qty: 90 TABLET | Refills: 1 | Status: CANCELLED | OUTPATIENT
Start: 2023-06-05

## 2023-06-05 NOTE — PROGRESS NOTES
Chief Complaint  Hypertension, Depression, and Heartburn    Subjective            Samina Marin presents to [facility]  History of Present Illness  Pt is a f/u for HTN, depression, and GERD. No issues or concerns at this time.     Pt is due mammogram 8/2023    PT is due MAW. PT is scheduled 6/20/23    PT is due Covid vaccine. Pt declines and understands the risks of not having.       Past Medical History:   Diagnosis Date    Allergic rhinitis     Chronic bronchitis     Hypertension     Perianal abscess     Sinus bradycardia        Allergies   Allergen Reactions    Meperidine Rash        Past Surgical History:   Procedure Laterality Date    BACK SURGERY      CARDIAC CATHETERIZATION N/A 12/9/2021    Procedure: Left Heart Cath;  Surgeon: Marily Maya MD;  Location:  [facility];  Service: Cardiology;  Laterality: N/A;    CARDIAC CATHETERIZATION N/A 12/9/2021    Procedure: Coronary angiography;  Surgeon: Marily Maya MD;  Location:  [facility];  Service: Cardiology;  Laterality: N/A;    CARDIAC CATHETERIZATION N/A 12/9/2021    Procedure: Left ventriculography;  Surgeon: Marily Maya MD;  Location:  [facility];  Service: Cardiology;  Laterality: N/A;    CARDIAC ELECTROPHYSIOLOGY STUDY AND ABLATION      EPIDURAL Right 11/7/2022    Procedure: LUMBAR/SACRAL TRANSFORAMINAL EPIDURAL RIGHT L4, L5;  Surgeon: Jonelle Abraham MD;  Location: [facility] MA[facility];  Service: Pain Management;  Laterality: Right;    HYSTERECTOMY      MEDIAL BRANCH BLOCK Bilateral 1/4/2023    Procedure: LUMBAR MEDIAL BRANCH BLOCK BILATERAL L3-L5 #1;  Surgeon: Jonelle Abraham MD;  Location: [facility];  Service: Pain Management;  Laterality: Bilateral;    ROTATOR CUFF REPAIR Right     TENDON REPAIR Right     SHOULDER        Social History     Tobacco Use    Smoking status: Never    Smokeless tobacco: Never    Tobacco comments:     CAFFEINE USE   Substance  "Use Topics    Alcohol use: Yes     Comment: RARE       Family History   Problem Relation Age of Onset    Heart disease Sister     Ovarian cancer Sister     Liver cancer Sister     Heart disease Brother     Hypertension Sister     Pancreatic cancer Sister         Current Outpatient Medications on File Prior to Visit   Medication Sig    aspirin 81 MG EC tablet Take 1 tablet by mouth Daily.    cetirizine (zyrTEC) 10 MG tablet Take 1 tablet by mouth Daily.    DULoxetine (CYMBALTA) 60 MG capsule Take 1 capsule by mouth Daily.    esomeprazole (nexIUM) 40 MG capsule Take 1 capsule by mouth Daily.    tolterodine (DETROL) 2 MG tablet TAKE 1 TABLET TWICE A DAY    [DISCONTINUED] diclofenac (VOLTAREN) 50 MG EC tablet TAKE 1 TABLET BY MOUTH TWICE DAILY AS NEEDED FOR JOINT PAIN    [DISCONTINUED] metoprolol tartrate (LOPRESSOR) 25 MG tablet TAKE 1 TABLET TWICE A DAY    [DISCONTINUED] amLODIPine (NORVASC) 10 MG tablet      No current facility-administered medications on file prior to visit.       Health Maintenance Due   Topic Date Due    ANNUAL WELLNESS VISIT  05/23/2023       Objective     /42   Pulse 76   Ht 160 cm (63\")   Wt 100 kg (221 lb)   SpO2 97%   BMI 39.15 kg/m²       Physical Exam  Constitutional:       General: She is not in acute distress.     Appearance: Normal appearance. She is not ill-appearing.   HENT:      Head: Normocephalic and atraumatic.      Right Ear: Tympanic membrane, ear canal and external ear normal.      Left Ear: Tympanic membrane, ear canal and external ear normal.      Nose: Nose normal.   Cardiovascular:      Rate and Rhythm: Normal rate and regular rhythm.      Heart sounds: Normal heart sounds. No murmur heard.  Pulmonary:      Effort: Pulmonary effort is normal. No respiratory distress.      Breath sounds: Normal breath sounds.   Chest:      Chest wall: No tenderness.   Abdominal:      General: Abdomen is flat. Bowel sounds are normal. There is no distension.      Palpations: Abdomen " is soft. There is no mass.      Tenderness: There is no abdominal tenderness. There is no guarding.   Musculoskeletal:         General: No swelling or tenderness. Normal range of motion.      Cervical back: Normal range of motion and neck supple.   Skin:     General: Skin is warm and dry.      Findings: No rash.   Neurological:      General: No focal deficit present.      Mental Status: She is alert and oriented to person, place, and time. Mental status is at baseline.      Gait: Gait normal.   Psychiatric:         Mood and Affect: Mood normal.         Behavior: Behavior normal.         Thought Content: Thought content normal.         Judgment: Judgment normal.         Result Review :                           Assessment and Plan        Diagnoses and all orders for this visit:    1. Arthralgia, unspecified joint (Primary)  Comments:  stable on voltaren 50mg, continue  Orders:  -     diclofenac (VOLTAREN) 50 MG EC tablet; Take 1 tablet by mouth 2 (Two) Times a Day.  Dispense: 180 tablet; Refill: 1    2. Essential hypertension  Comments:  stable on lopressor 25mg  Orders:  -     metoprolol tartrate (LOPRESSOR) 25 MG tablet; Take 1 tablet by mouth 2 (Two) Times a Day.  Dispense: 180 tablet; Refill: 1    3. Encounter for screening mammogram for malignant neoplasm of breast  -     Mammo Screening Digital Tomosynthesis Bilateral With CAD; Future    4. Gastroesophageal reflux disease without esophagitis  Comments:  stable on nexium 40mg, continue      Class 2 Severe Obesity (BMI >=35 and <=39.9). Obesity-related health conditions include the following: hypertension. Obesity is unchanged. BMI is is above average; BMI management plan is completed. We discussed portion control and increasing exercise.           Follow Up     Return in about 6 months (around 12/5/2023).    Patient was given instructions and counseling regarding her condition or for health maintenance advice. Please see specific information pulled into the AVS  if appropriate.     Samina GONZALEZ Tomas  reports that she has never smoked. She has never used smokeless tobacco..

## 2023-06-12 ENCOUNTER — TELEPHONE (OUTPATIENT)
Dept: FAMILY MEDICINE CLINIC | Facility: CLINIC | Age: 73
End: 2023-06-12
Payer: MEDICARE

## 2023-06-12 NOTE — TELEPHONE ENCOUNTER
Caller: Samina Marin    Relationship: Self    Best call back number: [phone]     What medication are you requesting: MUSCLE RELAXER    What are your current symptoms: MUSCLE SPASM    How long have you been experiencing symptoms: ONE DAY    Have you had these symptoms before:    [] Yes  [x] No    Have you been treated for these symptoms before:   [] Yes  [x] No    If a prescription is needed, what is your preferred pharmacy and phone number: [id number] [address]  [phone]  - [phone] FX

## 2023-07-13 ENCOUNTER — HOSPITAL ENCOUNTER (EMERGENCY)
Facility: HOSPITAL | Age: 73
Discharge: HOME | End: 2023-07-13
Attending: EMERGENCY MEDICINE
Payer: MEDICARE

## 2023-07-13 VITALS
HEART RATE: 68 BPM | BODY MASS INDEX: 34.99 KG/M2 | TEMPERATURE: 99 F | RESPIRATION RATE: 18 BRPM | OXYGEN SATURATION: 95 % | WEIGHT: 210 LBS | SYSTOLIC BLOOD PRESSURE: 132 MMHG | HEIGHT: 65 IN | DIASTOLIC BLOOD PRESSURE: 80 MMHG

## 2023-07-13 DIAGNOSIS — S01.81XA LACERATION OF FOREHEAD, INITIAL ENCOUNTER: ICD-10-CM

## 2023-07-13 DIAGNOSIS — S09.90XA INJURY OF HEAD, INITIAL ENCOUNTER: Primary | ICD-10-CM

## 2023-07-13 PROCEDURE — 12011 RPR F/E/E/N/L/M 2.5 CM/<: CPT

## 2023-07-13 PROCEDURE — 25000003 PHARM REV CODE 250: Performed by: NURSE PRACTITIONER

## 2023-07-13 PROCEDURE — 99284 EMERGENCY DEPT VISIT MOD MDM: CPT | Mod: 25

## 2023-07-13 RX ORDER — LIDOCAINE HYDROCHLORIDE 10 MG/ML
5 INJECTION, SOLUTION EPIDURAL; INFILTRATION; INTRACAUDAL; PERINEURAL
Status: COMPLETED | OUTPATIENT
Start: 2023-07-13 | End: 2023-07-13

## 2023-07-13 RX ADMIN — LIDOCAINE HYDROCHLORIDE 50 MG: 10 INJECTION, SOLUTION EPIDURAL; INFILTRATION; INTRACAUDAL; PERINEURAL at 09:07

## 2023-07-13 NOTE — ED NOTES
"LACERATION AT R EYEBROW AREA APPROXIMATELY 1-2" LENGTH WITH QUARTER SIZED HEMATOMA. NO LOC. BLEEDING STOPPED. CLEANSED BY PATIENT. STATES FALL IN SHOWER PTA. ALERT, ORIENTED AND AMBULATORY. LACERATION TRAY AT BS. MD AT BS.   "

## 2023-07-13 NOTE — ED PROVIDER NOTES
Encounter Date: 7/13/2023       History     Chief Complaint   Patient presents with    Head Laceration     Head laceration from slipping in the tub this morning. No LOC. No thinners. Bleeding controlled.      Kathy Zamarripa is a 72 year old female with no pmh presenting to the ED with a wound to the right forehead. The patient states that she was getting into the shower and slipped on the wet floor, striking her head on the side of the bathtub. She had no LOC and denies any visual/speech/gait changes, weakness, numbness, confusion. She takes an aspirin daily. She reports neck stiffness but states this is unchanged from her baseline. Last tetanus per patient was within the last 6 months with her PCP in [place].     Review of patient's allergies indicates:   Allergen Reactions    Demerol [meperidine] Rash     No past medical history on file.  No past surgical history on file.  No family history on file.     Review of Systems   Constitutional:  Negative for fever.   HENT:  Negative for sore throat.    Eyes:  Negative for visual disturbance.   Respiratory:  Negative for shortness of breath.    Cardiovascular:  Negative for chest pain.   Gastrointestinal:  Negative for nausea.   Genitourinary:  Negative for dysuria.   Musculoskeletal:  Negative for back pain.   Skin:  Positive for wound (right forehead). Negative for rash.   Neurological:  Negative for seizures, syncope, speech difficulty, weakness and headaches.   Hematological:  Does not bruise/bleed easily.     Physical Exam     Initial Vitals [07/13/23 0852]   BP Pulse Resp Temp SpO2   139/86 81 16 97.9 °F (36.6 °C) (!) 94 %      MAP       --         Physical Exam    Nursing note and vitals reviewed.  Constitutional: She appears well-developed and well-nourished. She is not diaphoretic. No distress.   HENT:   Head: Normocephalic.       Mouth/Throat: Oropharynx is clear and moist.   Eyes: Conjunctivae and EOM are normal. Pupils are equal, round, and reactive to light.    Neck: Neck supple.   Cardiovascular:  Normal rate, regular rhythm, normal heart sounds and intact distal pulses.     Exam reveals no gallop and no friction rub.       No murmur heard.  Pulmonary/Chest: Breath sounds normal. No respiratory distress. She has no wheezes. She has no rhonchi. She has no rales.   Abdominal: Abdomen is soft. She exhibits no distension. There is no abdominal tenderness.   Musculoskeletal:         General: Normal range of motion.      Cervical back: Neck supple.     Neurological: She is alert and oriented to person, place, and time. She has normal strength. No cranial nerve deficit or sensory deficit. Coordination and gait normal. GCS eye subscore is 4. GCS verbal subscore is 5. GCS motor subscore is 6.   Cranial nerves 3-12 grossly intact  5/5 strength in bilateral upper and lower extremities  Normal gait   Skin: No rash noted. No erythema.   Psychiatric: Her speech is normal.       ED Course   Lac Repair    Date/Time: 7/13/2023 12:11 PM  Performed by: Adina Pope NP  Authorized by: Adelso Cui MD     Consent:     Consent obtained:  Verbal    Consent given by:  Patient  Universal protocol:     Imaging studies available: yes      Patient identity confirmed:  Verbally with patient  Anesthesia:     Anesthesia method:  Local infiltration    Local anesthetic:  Lidocaine 1% w/o epi  Laceration details:     Location:  Face    Face location:  Forehead    Length (cm):  2.5  Pre-procedure details:     Preparation:  Patient was prepped and draped in usual sterile fashion  Treatment:     Area cleansed with:  Povidone-iodine    Amount of cleaning:  Standard    Irrigation solution:  Sterile water    Irrigation volume:  150    Irrigation method:  Syringe  Skin repair:     Repair method:  Sutures    Suture size:  5-0    Suture material:  Nylon    Suture technique:  Simple interrupted    Number of sutures:  5  Approximation:     Approximation:  Close  Repair type:     Repair type:   Simple  Post-procedure details:     Dressing:  Open (no dressing)    Procedure completion:  Tolerated well, no immediate complications  Labs Reviewed - No data to display       Imaging Results              CT Cervical Spine Without Contrast (Final result)  Result time 07/13/23 11:20:52      Final result by Vashti Murcia MD (07/13/23 11:20:52)                   Narrative:    All CT scans at this facility used dose modulation, iterative reconstruction and/or weight-based dosing when appropriate to reduce radiation doses  as low as reasonably achievable.    CT scan of the cervical spine    Clinical history is Neck trauma (Age >= 65y)    Axial images the cervical spine were obtained with sagittal and coronal reconstructed images. The cervical spine is in satisfactory alignment. The vertebral bodies are of normal height. There is no fracture or subluxation. The facet joints are aligned. The odontoid process is intact the cranial cervical junction is normal.  There is disc space narrowing and anterior spurring at C5-6 and C6-7. There is foraminal narrowing secondary to facet hypertrophy at C5-6 and C6-7. There is no epidural fluid collection. The paraspinous soft tissues are normal. The lungs apices are clear.    IMPRESSION: No acute fracture or subluxation    Degenerative disc disease and facet hypertrophy at C5-6 and C6-7 resulting in foraminal narrowing    Electronically signed by:  Vashti Murcia MD  7/13/2023 11:20 AM CDT Workstation: [id number]                                     CT Head Without Contrast (Final result)  Result time 07/13/23 10:50:05      Final result by Elliot Rico MD (07/13/23 10:50:05)                   Narrative:    CMS MANDATED QUALITY DATA - CT RADIATION 436    All CT scans at this facility utilize dose modulation, iterative reconstruction, and/or weight based dosing when appropriate to reduce radiation dose to as low as reasonably achievable.    CLINICAL HISTORY:  72 years  (1950) Female Head trauma, minor (Age >= 65y) Fell in tub, hit right eyebrow    TECHNIQUE:  CT HEAD WITHOUT IV CONTRAST. Axial CT of the brain without contrast using soft tissue and bone algorithm. .    COMPARISON:  None available.    FINDINGS:  Focal soft tissue swelling along the superior lateral right orbital brim with no acute intracranial hemorrhage, hydrocephalus, herniation or midline shift and the basal and suprasellar cisterns are patent. No acute skull fracture is identified. Small rounded defect in the right basal ganglia (image 29) suggesting a prior lacunar infarct. The ventricles and sulci are normal. There is normal gray white differentiation.  The orbits appear within normal limits noting likely bilateral lens replacement/cataract surgery. External auditory canals are unremarkable. The visualized paranasal sinuses and mastoid air cells are essentially clear.    IMPRESSION:  1. No acute intracranial process.  2. Chronic/involutional findings as noted above.                  .    Electronically signed by:  Elliot Rico MD  7/13/2023 10:50 AM CDT Workstation: [id number]                                     Medications   LIDOcaine (PF) 10 mg/ml (1%) injection 50 mg (50 mg Infiltration Given 7/13/23 0917)           APC / Resident Notes:   This is an urgent evaluation of a 72 year old female presenting to the ED with a wound to the right forehead after a slip and fall where she struck the side of her head on a bathtub. She had no LOC and has a normal neuro exam. 2.5 cm laceration noted to right forehead with bleeding controlled. Head and cervical spine CT ordered and radiology report reviewed. No acute intracranial process on head CT and no acute fracture/dislocation noted on cervical spine CT. I did notify patient of incidental findings on head CT that were suggestive of prior lacunar infarct. She was provided a copy of CT and instructed to follow up with her PCP when she returns to [place].  Her wound was irrigated by me and then repaired without difficulty. I discussed wound care as well as head injury precautions with the patient. She was instructed to follow up with PCP or urgent care/ER in 5 days for suture removal. Strict ED return precautions discussed and patient verbalized understanding. Based on my clinical evaluation, I do not appreciate any immediate, emergent, or life threatening condition or etiology that warrants additional workup today and feel that the patient can be discharged with close follow up care.                      Clinical Impression:   Final diagnoses:  [S09.90XA] Injury of head, initial encounter (Primary)  [S01.81XA] Laceration of forehead, initial encounter        ED Disposition Condition    Discharge Stable          ED Prescriptions    None       Follow-up Information       Follow up With Specialties Details Why Contact Info Additional Information    [facility] - Emergency Dept Emergency Medicine  As needed, If symptoms worsen [address] [facility] [id number]  [phone] 1st floor    Primary care or urgent care  In 5 days For suture removal               Adina Pope NP  07/13/23 1211

## 2023-07-13 NOTE — ED NOTES
ALERT AND ORIENTED. WELL APPROXIMATED SUTURES AT R EYEBROW AREA. NO SS BLEEDING. ICE PACK PROVIDED. ALERT AND AMBULATORY.

## 2023-07-13 NOTE — Clinical Note
"Kathy Powelllauryn Zamarripa was seen and treated in our emergency department on 7/13/2023.  She may return to work on 07/14/2023.       If you have any questions or concerns, please don't hesitate to call.      Adina Pope NP"

## 2023-07-18 ENCOUNTER — APPOINTMENT (OUTPATIENT)
Dept: CT IMAGING | Facility: HOSPITAL | Age: 73
End: 2023-07-18
Payer: MEDICARE

## 2023-07-18 ENCOUNTER — APPOINTMENT (OUTPATIENT)
Dept: OTHER | Facility: HOSPITAL | Age: 73
End: 2023-07-18
Payer: MEDICARE

## 2023-07-18 ENCOUNTER — HOSPITAL ENCOUNTER (OUTPATIENT)
Facility: HOSPITAL | Age: 73
Setting detail: OBSERVATION
Discharge: HOME | End: 2023-07-20
Attending: EMERGENCY MEDICINE | Admitting: HOSPITALIST
Payer: MEDICARE

## 2023-07-18 ENCOUNTER — APPOINTMENT (OUTPATIENT)
Dept: GENERAL RADIOLOGY | Facility: HOSPITAL | Age: 73
End: 2023-07-18
Payer: MEDICARE

## 2023-07-18 DIAGNOSIS — R47.89 WORD FINDING DIFFICULTY: ICD-10-CM

## 2023-07-18 DIAGNOSIS — Z09 FOLLOW-UP EXAM: Primary | ICD-10-CM

## 2023-07-18 DIAGNOSIS — H53.9 VISUAL CHANGES: ICD-10-CM

## 2023-07-18 DIAGNOSIS — R41.0 CONFUSION: ICD-10-CM

## 2023-07-18 DIAGNOSIS — Z48.02 VISIT FOR SUTURE REMOVAL: ICD-10-CM

## 2023-07-18 PROBLEM — R29.818 ACUTE FOCAL NEUROLOGICAL DEFICIT: Status: ACTIVE | Noted: 2023-07-18

## 2023-07-18 LAB
ALBUMIN SERPL-MCNC: 3.5 G/DL (ref 3.5–5.2)
ALBUMIN/GLOB SERPL: 1.6 G/DL
ALP SERPL-CCNC: 96 U/L (ref 39–117)
ALT SERPL W P-5'-P-CCNC: 6 U/L (ref 1–33)
ANION GAP SERPL CALCULATED.3IONS-SCNC: 7.3 MMOL/L (ref 5–15)
AST SERPL-CCNC: 18 U/L (ref 1–32)
BACTERIA UR QL AUTO: ABNORMAL /HPF
BASOPHILS # BLD AUTO: 0.02 10*3/MM3 (ref 0–0.2)
BASOPHILS NFR BLD AUTO: 0.4 % (ref 0–1.5)
BILIRUB SERPL-MCNC: 0.6 MG/DL (ref 0–1.2)
BILIRUB UR QL STRIP: NEGATIVE
BUN SERPL-MCNC: 8 MG/DL (ref 8–23)
BUN/CREAT SERPL: 12.5 (ref 7–25)
CALCIUM SPEC-SCNC: 8.9 MG/DL (ref 8.6–10.5)
CHLORIDE SERPL-SCNC: 107 MMOL/L (ref 98–107)
CLARITY UR: CLEAR
CO2 SERPL-SCNC: 27.7 MMOL/L (ref 22–29)
COLOR UR: YELLOW
CREAT SERPL-MCNC: 0.64 MG/DL (ref 0.57–1)
DEPRECATED RDW RBC AUTO: 41.7 FL (ref 37–54)
EGFRCR SERPLBLD CKD-EPI 2021: 94 ML/MIN/1.73
EOSINOPHIL # BLD AUTO: 0.15 10*3/MM3 (ref 0–0.4)
EOSINOPHIL NFR BLD AUTO: 3.2 % (ref 0.3–6.2)
ERYTHROCYTE [DISTWIDTH] IN BLOOD BY AUTOMATED COUNT: 13.5 % (ref 12.3–15.4)
GLOBULIN UR ELPH-MCNC: 2.2 GM/DL
GLUCOSE BLDC GLUCOMTR-MCNC: 109 MG/DL (ref 70–130)
GLUCOSE SERPL-MCNC: 98 MG/DL (ref 65–99)
GLUCOSE UR STRIP-MCNC: NEGATIVE MG/DL
HCT VFR BLD AUTO: 37.2 % (ref 34–46.6)
HGB BLD-MCNC: 12.2 G/DL (ref 12–15.9)
HGB UR QL STRIP.AUTO: NEGATIVE
HOLD SPECIMEN: NORMAL
HYALINE CASTS UR QL AUTO: ABNORMAL /LPF
IMM GRANULOCYTES # BLD AUTO: 0.02 10*3/MM3 (ref 0–0.05)
IMM GRANULOCYTES NFR BLD AUTO: 0.4 % (ref 0–0.5)
KETONES UR QL STRIP: NEGATIVE
LEUKOCYTE ESTERASE UR QL STRIP.AUTO: ABNORMAL
LYMPHOCYTES # BLD AUTO: 1.37 10*3/MM3 (ref 0.7–3.1)
LYMPHOCYTES NFR BLD AUTO: 29.3 % (ref 19.6–45.3)
MAGNESIUM SERPL-MCNC: 1.9 MG/DL (ref 1.6–2.4)
MCH RBC QN AUTO: 27.8 PG (ref 26.6–33)
MCHC RBC AUTO-ENTMCNC: 32.8 G/DL (ref 31.5–35.7)
MCV RBC AUTO: 84.7 FL (ref 79–97)
MONOCYTES # BLD AUTO: 0.29 10*3/MM3 (ref 0.1–0.9)
MONOCYTES NFR BLD AUTO: 6.2 % (ref 5–12)
NEUTROPHILS NFR BLD AUTO: 2.83 10*3/MM3 (ref 1.7–7)
NEUTROPHILS NFR BLD AUTO: 60.5 % (ref 42.7–76)
NITRITE UR QL STRIP: NEGATIVE
NRBC BLD AUTO-RTO: 0 /100 WBC (ref 0–0.2)
PH UR STRIP.AUTO: 7.5 [PH] (ref 5–8)
PLATELET # BLD AUTO: 162 10*3/MM3 (ref 140–450)
PMV BLD AUTO: 9 FL (ref 6–12)
POTASSIUM SERPL-SCNC: 3.9 MMOL/L (ref 3.5–5.2)
PROT SERPL-MCNC: 5.7 G/DL (ref 6–8.5)
PROT UR QL STRIP: NEGATIVE
QT INTERVAL: 419 MS
RBC # BLD AUTO: 4.39 10*6/MM3 (ref 3.77–5.28)
RBC # UR STRIP: ABNORMAL /HPF
REF LAB TEST METHOD: ABNORMAL
SODIUM SERPL-SCNC: 142 MMOL/L (ref 136–145)
SP GR UR STRIP: 1.01 (ref 1–1.03)
SQUAMOUS #/AREA URNS HPF: ABNORMAL /HPF
TROPONIN T SERPL HS-MCNC: 10 NG/L
UROBILINOGEN UR QL STRIP: ABNORMAL
WBC # UR STRIP: ABNORMAL /HPF
WBC NRBC COR # BLD: 4.68 10*3/MM3 (ref 3.4–10.8)
WHOLE BLOOD HOLD COAG: NORMAL
WHOLE BLOOD HOLD SPECIMEN: NORMAL

## 2023-07-18 PROCEDURE — 85025 COMPLETE CBC W/AUTO DIFF WBC: CPT | Performed by: EMERGENCY MEDICINE

## 2023-07-18 PROCEDURE — 99285 EMERGENCY DEPT VISIT HI MDM: CPT

## 2023-07-18 PROCEDURE — 83735 ASSAY OF MAGNESIUM: CPT | Performed by: EMERGENCY MEDICINE

## 2023-07-18 PROCEDURE — G0378 HOSPITAL OBSERVATION PER HR: HCPCS

## 2023-07-18 PROCEDURE — 93010 ELECTROCARDIOGRAM REPORT: CPT | Performed by: INTERNAL MEDICINE

## 2023-07-18 PROCEDURE — 93005 ELECTROCARDIOGRAM TRACING: CPT | Performed by: EMERGENCY MEDICINE

## 2023-07-18 PROCEDURE — 80053 COMPREHEN METABOLIC PANEL: CPT | Performed by: EMERGENCY MEDICINE

## 2023-07-18 PROCEDURE — 71045 X-RAY EXAM CHEST 1 VIEW: CPT

## 2023-07-18 PROCEDURE — 82948 REAGENT STRIP/BLOOD GLUCOSE: CPT

## 2023-07-18 PROCEDURE — 70450 CT HEAD/BRAIN W/O DYE: CPT

## 2023-07-18 PROCEDURE — 84484 ASSAY OF TROPONIN QUANT: CPT | Performed by: EMERGENCY MEDICINE

## 2023-07-18 PROCEDURE — 81001 URINALYSIS AUTO W/SCOPE: CPT | Performed by: EMERGENCY MEDICINE

## 2023-07-18 RX ORDER — OXYBUTYNIN CHLORIDE 10 MG/1
10 TABLET, EXTENDED RELEASE ORAL DAILY
Status: DISCONTINUED | OUTPATIENT
Start: 2023-07-19 | End: 2023-07-20 | Stop reason: HOSPADM

## 2023-07-18 RX ORDER — ASPIRIN 325 MG
325 TABLET ORAL DAILY
Status: DISCONTINUED | OUTPATIENT
Start: 2023-07-18 | End: 2023-07-19

## 2023-07-18 RX ORDER — ATORVASTATIN CALCIUM 80 MG/1
80 TABLET, FILM COATED ORAL NIGHTLY
Status: DISCONTINUED | OUTPATIENT
Start: 2023-07-18 | End: 2023-07-19

## 2023-07-18 RX ORDER — PANTOPRAZOLE SODIUM 40 MG/1
40 TABLET, DELAYED RELEASE ORAL
Status: DISCONTINUED | OUTPATIENT
Start: 2023-07-19 | End: 2023-07-20 | Stop reason: HOSPADM

## 2023-07-18 RX ORDER — ACETAMINOPHEN 650 MG/1
650 SUPPOSITORY RECTAL EVERY 4 HOURS PRN
Status: DISCONTINUED | OUTPATIENT
Start: 2023-07-18 | End: 2023-07-20 | Stop reason: HOSPADM

## 2023-07-18 RX ORDER — SODIUM CHLORIDE 0.9 % (FLUSH) 0.9 %
10 SYRINGE (ML) INJECTION AS NEEDED
Status: DISCONTINUED | OUTPATIENT
Start: 2023-07-18 | End: 2023-07-20 | Stop reason: HOSPADM

## 2023-07-18 RX ORDER — DULOXETIN HYDROCHLORIDE 60 MG/1
60 CAPSULE, DELAYED RELEASE ORAL DAILY
Status: DISCONTINUED | OUTPATIENT
Start: 2023-07-19 | End: 2023-07-20 | Stop reason: HOSPADM

## 2023-07-18 RX ORDER — SODIUM CHLORIDE 9 MG/ML
75 INJECTION, SOLUTION INTRAVENOUS CONTINUOUS
Status: DISCONTINUED | OUTPATIENT
Start: 2023-07-18 | End: 2023-07-20 | Stop reason: HOSPADM

## 2023-07-18 RX ORDER — ACETAMINOPHEN 325 MG/1
650 TABLET ORAL EVERY 4 HOURS PRN
Status: DISCONTINUED | OUTPATIENT
Start: 2023-07-18 | End: 2023-07-20 | Stop reason: HOSPADM

## 2023-07-18 RX ORDER — ASPIRIN 81 MG/1
81 TABLET ORAL DAILY
Status: DISCONTINUED | OUTPATIENT
Start: 2023-07-19 | End: 2023-07-18

## 2023-07-18 RX ORDER — ONDANSETRON 2 MG/ML
4 INJECTION INTRAMUSCULAR; INTRAVENOUS EVERY 6 HOURS PRN
Status: DISCONTINUED | OUTPATIENT
Start: 2023-07-18 | End: 2023-07-20 | Stop reason: HOSPADM

## 2023-07-18 RX ORDER — BISACODYL 10 MG
10 SUPPOSITORY, RECTAL RECTAL DAILY PRN
Status: DISCONTINUED | OUTPATIENT
Start: 2023-07-18 | End: 2023-07-20 | Stop reason: HOSPADM

## 2023-07-18 RX ORDER — ASPIRIN 300 MG/1
300 SUPPOSITORY RECTAL DAILY
Status: DISCONTINUED | OUTPATIENT
Start: 2023-07-18 | End: 2023-07-20 | Stop reason: HOSPADM

## 2023-07-18 RX ADMIN — METOPROLOL TARTRATE 25 MG: 25 TABLET, FILM COATED ORAL at 23:59

## 2023-07-18 RX ADMIN — ATORVASTATIN CALCIUM 80 MG: 80 TABLET, FILM COATED ORAL at 20:03

## 2023-07-18 RX ADMIN — SODIUM CHLORIDE 75 ML/HR: 9 INJECTION, SOLUTION INTRAVENOUS at 20:03

## 2023-07-18 RX ADMIN — ASPIRIN 325 MG: 325 TABLET ORAL at 20:03

## 2023-07-18 NOTE — ED PROVIDER NOTES
EMERGENCY DEPARTMENT ENCOUNTER    Room Number:  26/26  Date seen:  7/18/2023  PCP: Jonelle Joseph APRN    Spoken Language:  English  Language interpretation services not needed     HPI:  Chief Complaint: symptoms after having a head injury    A complete HPI/ROS/PMH/PSH/SH/FH are unobtainable due to: n/a    Context: Samina Marin is a 72 y.o. female presenting to the emergency department complaining of persistent symptoms after having a head injury.  The history is being obtained by the patient, her son, her daughter-in-law and by review of the medical chart.  The patient states that she was in [place] for work last week whenever she had a mechanical fall, hitting her head on the tub.  This occurred on 07/13/2023.  She was seen in the emergency department in [place] and had a head CT and cervical CT performed, both of which did not have any acute abnormal findings.  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  However, since she has been home, she states that she has not felt well.  She has continued to feel pressure behind the right eye, as well as lightheadedness.  She states that she has had difficulty finding words.  Her daughter-in-law states that at times she feels like she has been searching for words.  The patient also mentions that she has had a black spot in the upper left visual field of her left eye.  She states that it was there all day yesterday, and that she noted it again this morning when she woke up.  She denies fever.  She states that she has noticed a mild sore throat.  She denies chest pain, shortness of breath, abdominal pain, nausea, vomiting or diarrhea.    PAST MEDICAL HISTORY  Active Ambulatory Problems     Diagnosis Date Noted    Allergic rhinitis 08/26/2021    Aortic valve regurgitation 04/28/2021    Arthritis 08/26/2021    Asthma 08/26/2021    Carpal tunnel syndrome, bilateral 08/26/2021    DDD (degenerative disc disease), lumbar 08/26/2021     Esophageal reflux 08/26/2021    Essential hypertension 04/28/2021    Fibromyalgia 03/05/2018    Major depressive disorder 03/05/2018    Mixed stress and urge urinary incontinence 08/26/2021    Mood disorder 08/26/2021    Obesity 08/26/2021    Paroxysmal supraventricular tachycardia 04/28/2021    Postmenopausal 08/26/2021    Shortness of breath 10/29/2012    Skin cancer 08/26/2021    Vitamin D deficiency 08/26/2021    Angina pectoris 12/02/2021    Lumbar radiculopathy 10/12/2022    Trochanteric bursitis of right hip 10/12/2022    Muscle spasm 10/12/2022    Thoracic spine pain 12/05/2022    Lumbar facet arthropathy 12/05/2022     Resolved Ambulatory Problems     Diagnosis Date Noted    Mixed hyperlipidemia 04/28/2021     Past Medical History:   Diagnosis Date    Chronic bronchitis     Hypertension     Perianal abscess     Sinus bradycardia        PAST SURGICAL HISTORY  Past Surgical History:   Procedure Laterality Date    BACK SURGERY      CARDIAC CATHETERIZATION N/A 12/9/2021    Procedure: Left Heart Cath;  Surgeon: Marily Maya MD;  Location:  [facility];  Service: Cardiology;  Laterality: N/A;    CARDIAC CATHETERIZATION N/A 12/9/2021    Procedure: Coronary angiography;  Surgeon: Marily Maya MD;  Location:  [facility];  Service: Cardiology;  Laterality: N/A;    CARDIAC CATHETERIZATION N/A 12/9/2021    Procedure: Left ventriculography;  Surgeon: Marily Maya MD;  Location:  [facility];  Service: Cardiology;  Laterality: N/A;    CARDIAC ELECTROPHYSIOLOGY STUDY AND ABLATION      EPIDURAL Right 11/7/2022    Procedure: LUMBAR/SACRAL TRANSFORAMINAL EPIDURAL RIGHT L4, L5;  Surgeon: Jonelle Abraham MD;  Location: [facility];  Service: Pain Management;  Laterality: Right;    HYSTERECTOMY      MEDIAL BRANCH BLOCK Bilateral 1/4/2023    Procedure: LUMBAR MEDIAL BRANCH BLOCK BILATERAL L3-L5 #1;  Surgeon: Jonelle Abraham MD;  Location: [facility];   Service: Pain Management;  Laterality: Bilateral;    ROTATOR CUFF REPAIR Right     TENDON REPAIR Right     SHOULDER       FAMILY HISTORY  Family History   Problem Relation Age of Onset    Heart disease Sister     Ovarian cancer Sister     Liver cancer Sister     Heart disease Brother     Hypertension Sister     Pancreatic cancer Sister        SOCIAL HISTORY  Social History     Socioeconomic History    Marital status:    Tobacco Use    Smoking status: Never    Smokeless tobacco: Never    Tobacco comments:     CAFFEINE USE   Vaping Use    Vaping Use: Never used   Substance and Sexual Activity    Alcohol use: Yes     Comment: RARE    Drug use: Never    Sexual activity: Defer       ALLERGIES  Meperidine    REVIEW OF SYSTEMS  All systems reviewed and negative except for those discussed in HPI.     PHYSICAL EXAM  ED Triage Vitals [07/18/23 1152]   Temp Heart Rate Resp BP SpO2   97 °F (36.1 °C) 89 18 151/91 96 %      Temp src Heart Rate Source Patient Position BP Location FiO2 (%)   Tympanic Monitor -- Left arm --       Physical Exam  Constitutional:       Appearance: Normal appearance.   HENT:      Head: Normocephalic.      Comments: ~2 cm sutured laceration to right forehead. Subacute right periorbital bruising.     Mouth/Throat:      Mouth: Mucous membranes are moist.   Eyes:      Extraocular Movements: Extraocular movements intact.      Pupils: Pupils are equal, round, and reactive to light.   Cardiovascular:      Rate and Rhythm: Normal rate and regular rhythm.   Pulmonary:      Effort: Pulmonary effort is normal.      Breath sounds: Normal breath sounds.   Abdominal:      General: There is no distension.      Palpations: Abdomen is soft.      Tenderness: There is no abdominal tenderness.   Musculoskeletal:      Cervical back: Normal range of motion and neck supple.   Neurological:      Comments: Mental Status: The patient is awake, alert and oriented x 3. Recent and remote memory functions are normal. The  patient is attentive with normal concentration.  Language is fluent. Speech is clear. The speech is non-dysarthric. Fund of knowledge is normal.  Cranial Nerve I: Not tested.  Cranial Nerve II: Visual fields are full to confrontation.  Cranial Nerves III, IV, VI: The pupils are 3 mm, equally round and reactive to light. The extraocular movements are full in all directions of gaze without nystagmus.  Cranial Nerve V: Facial sensation is intact to light touch.  Cranial Nerve VII: Facial movements are symmetric  Cranial Nerve VIII: Audition is intact to finger rub bilaterally.  Cranial Nerves IX, X: Uvula and palate elevate symmetrically.  Cranial Nerve XI: Sternocleidomastoid strength is 5/5 bilaterally with normal shoulder shrug.  Cranial Nerve XII: Midline tongue protrusion without atrophy or fasciculations.  Motor: Tone is normal in all four extremities without fasciculations, atrophy, or myoclonus. There are no involuntary movements.   Muscle Strength: 5/5 muscle strength in bilateral upper and bilateral lower extremities.   Sensory: The sensory examination is normal for light touch bilaterally and symmetrically.  Cerebellar: Finger to nose intact bilaterally.    Psychiatric:         Mood and Affect: Mood normal.         Behavior: Behavior normal.         Thought Content: Thought content normal.         Judgment: Judgment normal.       LAB RESULTS  Recent Results (from the past 24 hour(s))   ECG 12 Lead ED Triage Standing Order; Weak / Dizzy / AMS    Collection Time: 07/18/23 12:09 PM   Result Value Ref Range    QT Interval 419 ms   Comprehensive Metabolic Panel    Collection Time: 07/18/23 12:22 PM    Specimen: Blood   Result Value Ref Range    Glucose 98 65 - 99 mg/dL    BUN 8 8 - 23 mg/dL    Creatinine 0.64 0.57 - 1.00 mg/dL    Sodium 142 136 - 145 mmol/L    Potassium 3.9 3.5 - 5.2 mmol/L    Chloride 107 98 - 107 mmol/L    CO2 27.7 22.0 - 29.0 mmol/L    Calcium 8.9 8.6 - 10.5 mg/dL    Total Protein 5.7 (L) 6.0  - 8.5 g/dL    Albumin 3.5 3.5 - 5.2 g/dL    ALT (SGPT) 6 1 - 33 U/L    AST (SGOT) 18 1 - 32 U/L    Alkaline Phosphatase 96 39 - 117 U/L    Total Bilirubin 0.6 0.0 - 1.2 mg/dL    Globulin 2.2 gm/dL    A/G Ratio 1.6 g/dL    BUN/Creatinine Ratio 12.5 7.0 - 25.0    Anion Gap 7.3 5.0 - 15.0 mmol/L    eGFR 94.0 >60.0 mL/min/1.73   Single High Sensitivity Troponin T    Collection Time: 07/18/23 12:22 PM    Specimen: Blood   Result Value Ref Range    HS Troponin T 10 (H) <10 ng/L   Magnesium    Collection Time: 07/18/23 12:22 PM    Specimen: Blood   Result Value Ref Range    Magnesium 1.9 1.6 - 2.4 mg/dL   Green Top (Gel)    Collection Time: 07/18/23 12:22 PM   Result Value Ref Range    Extra Tube Hold for add-ons.    Lavender Top    Collection Time: 07/18/23 12:22 PM   Result Value Ref Range    Extra Tube hold for add-on    Light Blue Top    Collection Time: 07/18/23 12:22 PM   Result Value Ref Range    Extra Tube Hold for add-ons.    CBC Auto Differential    Collection Time: 07/18/23 12:22 PM    Specimen: Blood   Result Value Ref Range    WBC 4.68 3.40 - 10.80 10*3/mm3    RBC 4.39 3.77 - 5.28 10*6/mm3    Hemoglobin 12.2 12.0 - 15.9 g/dL    Hematocrit 37.2 34.0 - 46.6 %    MCV 84.7 79.0 - 97.0 fL    MCH 27.8 26.6 - 33.0 pg    MCHC 32.8 31.5 - 35.7 g/dL    RDW 13.5 12.3 - 15.4 %    RDW-SD 41.7 37.0 - 54.0 fl    MPV 9.0 6.0 - 12.0 fL    Platelets 162 140 - 450 10*3/mm3    Neutrophil % 60.5 42.7 - 76.0 %    Lymphocyte % 29.3 19.6 - 45.3 %    Monocyte % 6.2 5.0 - 12.0 %    Eosinophil % 3.2 0.3 - 6.2 %    Basophil % 0.4 0.0 - 1.5 %    Immature Grans % 0.4 0.0 - 0.5 %    Neutrophils, Absolute 2.83 1.70 - 7.00 10*3/mm3    Lymphocytes, Absolute 1.37 0.70 - 3.10 10*3/mm3    Monocytes, Absolute 0.29 0.10 - 0.90 10*3/mm3    Eosinophils, Absolute 0.15 0.00 - 0.40 10*3/mm3    Basophils, Absolute 0.02 0.00 - 0.20 10*3/mm3    Immature Grans, Absolute 0.02 0.00 - 0.05 10*3/mm3    nRBC 0.0 0.0 - 0.2 /100 WBC   Urinalysis With Microscopic  If Indicated (No Culture) - Urine, Clean Catch    Collection Time: 07/18/23  1:56 PM    Specimen: Urine, Clean Catch   Result Value Ref Range    Color, UA Yellow Yellow, Straw    Appearance, UA Clear Clear    pH, UA 7.5 5.0 - 8.0    Specific Gravity, UA 1.010 1.005 - 1.030    Glucose, UA Negative Negative    Ketones, UA Negative Negative    Bilirubin, UA Negative Negative    Blood, UA Negative Negative    Protein, UA Negative Negative    Leuk Esterase, UA Large (3+) (A) Negative    Nitrite, UA Negative Negative    Urobilinogen, UA 1.0 E.U./dL 0.2 - 1.0 E.U./dL   Urinalysis, Microscopic Only - Urine, Clean Catch    Collection Time: 07/18/23  1:56 PM    Specimen: Urine, Clean Catch   Result Value Ref Range    RBC, UA 0-2 None Seen, 0-2 /HPF    WBC, UA 21-30 (A) None Seen, 0-2 /HPF    Bacteria, UA None Seen None Seen /HPF    Squamous Epithelial Cells, UA 3-6 (A) None Seen, 0-2 /HPF    Hyaline Casts, UA 0-2 None Seen /LPF    Methodology Automated Microscopy        RADIOLOGY  Imaging Results (Last 24 Hours)       Procedure Component Value Units Date/Time    CT Head Without Contrast [[id number]] Collected: 07/18/23 1404     Updated: 07/18/23 1404    Narrative:      EMERGENCY CT SCAN OF THE HEAD WITHOUT CONTRAST ON 07/18/2023     CLINICAL HISTORY: Patient has a reported history of head trauma on  07/13/2023 where she had a mechanical fall hitting her head on the tub.  Patient has pain behind right eye, some lightheadedness, difficulty  finding words.      TECHNIQUE: Spiral CT images were obtained from the base of the skull to  the vertex without intravenous contrast. The images were reformatted and  are submitted in 3 mm thick axial, sagittal and coronal CT sections with  brain algorithm and 2 mm thick axial CT sections with high-resolution  bone algorithm.     This is correlated to prior outside head CT 07/13/2023.     FINDINGS: There is mild patchy low-density in the periventricular white  matter consistent with mild  small vessel disease. There is a 8 x 5 mm  old lacunar infarct extending from the genu of the right internal  capsule into the anterior right thalamus. The remainder of the brain  parenchyma is normal in attenuation. The ventricles are normal in size.  I see no focal mass effect. There is no midline shift. No extra-axial  fluid collections are identified. There is no evidence of acute  intracranial hemorrhage. No acute skull fracture is identified. The  calvarium and skull base are normal in appearance. The paranasal sinuses  and the mastoid air cells and middle ear cavities are clear.        Impression:      1. No change when compared to the outside head CT on 07/13/2023, 5 days  ago.  2. There is mild small vessel disease in the periventricular white  matter. There is an 8 x 5 mm old lacunar infarct extending from the genu  of the right internal capsule into the anterior right thalamus. The  remainder of the head CT is within normal limits. Specifically, no acute  skull fracture or intracranial hemorrhage is identified.     Radiation dose reduction techniques were utilized, including automated  exposure control and exposure modulation based on body size.          XR Chest 1 View [[id number]] Collected: 07/18/23 1244     Updated: 07/18/23 1249    Narrative:      ONE VIEW PORTABLE CHEST     HISTORY: Confusion. Hypertension. Recent head trauma.     FINDINGS: The lungs are well-expanded and clear. There is a small hiatus  hernia unchanged from 04/01/2021. The heart remains borderline enlarged.     This report was finalized on 7/18/2023 12:46 PM by Dr. Parteek Garcia M.D.       CT Outside Films [[id number]] Resulted: 07/18/23 1240     Updated: 07/18/23 1241    Narrative:      This procedure was auto-finalized with no dictation required.    CT Outside Films [[id number]] Resulted: 07/18/23 1238     Updated: 07/18/23 1239    Narrative:      This procedure was auto-finalized with no dictation required.             PROCEDURES  Procedures  None    MEDICATIONS GIVEN IN ER  Medications   sodium chloride 0.9 % flush 10 mL (has no administration in time range)       MEDICAL DECISION MAKING, PROGRESS, and CONSULTS  Vital signs and nursing notes have all been reviewed by me.    All laboratory results have been independently interpreted by me.      Orders placed during this visit:  Orders Placed This Encounter   Procedures    CT Head Without Contrast    XR Chest 1 View    CT Outside Films    CT Outside Films    Phyllis Draw    Comprehensive Metabolic Panel    Single High Sensitivity Troponin T    Magnesium    Urinalysis With Microscopic If Indicated (No Culture) - Urine, Clean Catch    CBC Auto Differential    Urinalysis, Microscopic Only - Urine, Clean Catch    NPO Diet NPO Type: Strict NPO    Undress & Gown    Continuous Pulse Oximetry    Vital Signs    Orthostatic Blood Pressure    [facility] (on-call MD unless specified) Details    Oxygen Therapy- Nasal Cannula; Titrate 1-6 LPM Per SpO2; 90 - 95%    POC Glucose Once    ECG 12 Lead ED Triage Standing Order; Weak / Dizzy / AMS    Insert Peripheral IV    Initiate Observation Status    Fall Precautions    CBC & Differential    Green Top (Gel)    Lavender Top    Gold Top - SST    Light Blue Top       Differential diagnosis includes but is not limited to:  -Postconcussive symptoms  -subacute CVA  -traumatic subdural hematoma or subarachnoid hemorrhage    Independent interpretation of labs, radiology studies, and discussions with consultants: see MD note    Discussion below represents my analysis of pertinent findings related to patient's condition, differential diagnosis, treatment plan and final disposition:    ED Course as of 07/18/23 1504   Tue Jul 18, 2023   1402 CT Head Without Contrast  My independent interpretation of the CT of the head is no acute hemorrhage [TR]   1415 EKG          EKG time: 1209  Rhythm/Rate: Normal sinus, rate 72  P waves and DE: Normal P, normal DE  QRS,  axis: Narrow QRS, left axis  ST and T waves: No acute    Independently Interpreted by me  Not significantly changed compared to prior 4/1/2021   [TR]   1426 Discussing with Dr. Ribeiro with stroke neurology.  She agrees to consult. [TR]   1426 I reviewed work-up and findings with the patient and family at the bedside.  Answered all questions.  Plan admission for MRI and neurology consult.  They are agreeable.  This could be postconcussive in nature however I am suspicious about a posterior circulation stroke. [TR]   1433 5 sutures removed from her right forehead without difficulty. [TR]   1449 Discussing with Dr. Borden with [facility].  She agrees to admit. [TR]   1452 Discussing with Dr. Galvez, neuroradiology who reports no acute findings.  The patient does have an old lacunar infarct. [TR]      ED Course User Index  [TR] Ihsan Brooks MD          Additional sources:  - Discussed/ obtained information from independent historians: patient's son and daughter-in-law    - Shared decision making: I discussed ED evaluation and treatment plan with patient who is in agreement.  Discussed at length ED testing.         DIAGNOSIS  Final diagnoses:   Confusion   Visual changes   Word finding difficulty   Visit for suture removal       DISPOSITION  ED Disposition       ED Disposition   Decision to Admit    Condition   --    Comment   Level of Care: Telemetry [5]   Diagnosis: Acute focal neurological deficit [473341]   Admitting Physician: ROBBIE BORDEN [[id number]]   Attending Physician: ROBBIE BORDEN [[id number]]               RX  Medications   sodium chloride 0.9 % flush 10 mL (has no administration in time range)          Medication List      No changes were made to your prescriptions during this visit.         Latest Documented Vital Signs:  As of 15:04 EDT  BP- 129/69 HR- 71 Temp- 97 °F (36.1 °C) (Tympanic) O2 sat- 96%    Provider Attestation:  I personally reviewed the past medical history, past surgical history, social  history, family history, current medications and allergies as they appear in the chart. I reviewed the patient's history, physical, lab/imaging results and overall care with Dr. Brooks who is in agreement with the patient's treatment plan.    EMR Dragon/Transcription disclaimer:  Dictated using Dragon dictation    Provider note signed by:    Note Disclaimer: At [facility], we believe that sharing information builds trust and better relationships. You are receiving this note because you are receiving care at [facility] or recently visited. It is possible you will see health information before a provider has talked with you about it. This kind of information can be easy to misunderstand. To help you fully understand what it means for your health, we urge you to discuss this note with your provider.         Ruba Rae PA  07/18/23 5502

## 2023-07-18 NOTE — ED NOTES
Pt stated she fell in [place] JULY 13, 2023 and was seen in the ED. ED did a CT scan. Pt to ed with AMS, intermittent vision loss on the left eye and confusion. Pt states HA and head pressure around the laceration on the L side of her forehead.     Pt is alert at time of triage.

## 2023-07-18 NOTE — ED PROVIDER NOTES
MD ATTESTATION NOTE    The MARLA and I have discussed this patient's history, physical exam, and treatment plan.  I have reviewed the documentation and personally had a face to face interaction with the patient. I affirm the documentation and agree with the treatment and plan.  The attached note describes my personal findings.    I provided a substantive portion of the care of this patient. I personally performed the physical exam, in its entirety.    Independent Historians: Patient, family    A complete HPI/ROS/PMH/PSH/SH/FH are unobtainable due to: Nothing    Chronic or social conditions impacting patient care (social determinants of health): None    Samina Marin is a 72 y.o. female who presents to the ED c/o acute visual field changes, word finding difficulty and confusion.  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  She was in [place] 5 days ago and tripped and fell.  She struck her head.  She went to the emergency room and had CT scans performed.  She was able to drive the bus back home but since then she has had episodes of word finding difficulty.  She reports she has a persistent visual field change.  She reports in her left upper vision she has a dark area that has not moved.  Family reports she has had episodes of confusion.      Review of prior external notes (non-ED) -and- Review of prior external test results outside of this encounter: Laboratory evaluation 4/10/2023 shows normal CMP, normal CBC    Prescription drug monitoring program review:        On exam:  GENERAL: Awake, alert, no acute distress  SKIN: Warm, dry  HENT: Normocephalic, right periorbital area with ecchymosis.  Linear healing laceration on the right temple with sutures intact.  EYES: no scleral icterus  CV: regular rhythm, regular rate  RESPIRATORY: normal effort, lungs clear  ABDOMEN: soft, nontender, nondistended  MUSCULOSKELETAL: no deformity  NEURO: alert, moves all extremities, follows commands.  Visual fields are intact  to direct confrontation.  She has normal cranial nerves.  She has equal in upper and lower extremity strength and sensation.  She is able to identify all the words on the stroke card, speaks fluently.                                                             Labs  Recent Results (from the past 24 hour(s))   ECG 12 Lead ED Triage Standing Order; Weak / Dizzy / AMS    Collection Time: 07/18/23 12:09 PM   Result Value Ref Range    QT Interval 419 ms   Comprehensive Metabolic Panel    Collection Time: 07/18/23 12:22 PM    Specimen: Blood   Result Value Ref Range    Glucose 98 65 - 99 mg/dL    BUN 8 8 - 23 mg/dL    Creatinine 0.64 0.57 - 1.00 mg/dL    Sodium 142 136 - 145 mmol/L    Potassium 3.9 3.5 - 5.2 mmol/L    Chloride 107 98 - 107 mmol/L    CO2 27.7 22.0 - 29.0 mmol/L    Calcium 8.9 8.6 - 10.5 mg/dL    Total Protein 5.7 (L) 6.0 - 8.5 g/dL    Albumin 3.5 3.5 - 5.2 g/dL    ALT (SGPT) 6 1 - 33 U/L    AST (SGOT) 18 1 - 32 U/L    Alkaline Phosphatase 96 39 - 117 U/L    Total Bilirubin 0.6 0.0 - 1.2 mg/dL    Globulin 2.2 gm/dL    A/G Ratio 1.6 g/dL    BUN/Creatinine Ratio 12.5 7.0 - 25.0    Anion Gap 7.3 5.0 - 15.0 mmol/L    eGFR 94.0 >60.0 mL/min/1.73   Single High Sensitivity Troponin T    Collection Time: 07/18/23 12:22 PM    Specimen: Blood   Result Value Ref Range    HS Troponin T 10 (H) <10 ng/L   Magnesium    Collection Time: 07/18/23 12:22 PM    Specimen: Blood   Result Value Ref Range    Magnesium 1.9 1.6 - 2.4 mg/dL   Green Top (Gel)    Collection Time: 07/18/23 12:22 PM   Result Value Ref Range    Extra Tube Hold for add-ons.    Lavender Top    Collection Time: 07/18/23 12:22 PM   Result Value Ref Range    Extra Tube hold for add-on    Light Blue Top    Collection Time: 07/18/23 12:22 PM   Result Value Ref Range    Extra Tube Hold for add-ons.    CBC Auto Differential    Collection Time: 07/18/23 12:22 PM    Specimen: Blood   Result Value Ref Range    WBC 4.68 3.40 - 10.80 10*3/mm3    RBC 4.39 3.77 - 5.28  10*6/mm3    Hemoglobin 12.2 12.0 - 15.9 g/dL    Hematocrit 37.2 34.0 - 46.6 %    MCV 84.7 79.0 - 97.0 fL    MCH 27.8 26.6 - 33.0 pg    MCHC 32.8 31.5 - 35.7 g/dL    RDW 13.5 12.3 - 15.4 %    RDW-SD 41.7 37.0 - 54.0 fl    MPV 9.0 6.0 - 12.0 fL    Platelets 162 140 - 450 10*3/mm3    Neutrophil % 60.5 42.7 - 76.0 %    Lymphocyte % 29.3 19.6 - 45.3 %    Monocyte % 6.2 5.0 - 12.0 %    Eosinophil % 3.2 0.3 - 6.2 %    Basophil % 0.4 0.0 - 1.5 %    Immature Grans % 0.4 0.0 - 0.5 %    Neutrophils, Absolute 2.83 1.70 - 7.00 10*3/mm3    Lymphocytes, Absolute 1.37 0.70 - 3.10 10*3/mm3    Monocytes, Absolute 0.29 0.10 - 0.90 10*3/mm3    Eosinophils, Absolute 0.15 0.00 - 0.40 10*3/mm3    Basophils, Absolute 0.02 0.00 - 0.20 10*3/mm3    Immature Grans, Absolute 0.02 0.00 - 0.05 10*3/mm3    nRBC 0.0 0.0 - 0.2 /100 WBC   Urinalysis With Microscopic If Indicated (No Culture) - Urine, Clean Catch    Collection Time: 07/18/23  1:56 PM    Specimen: Urine, Clean Catch   Result Value Ref Range    Color, UA Yellow Yellow, Straw    Appearance, UA Clear Clear    pH, UA 7.5 5.0 - 8.0    Specific Gravity, UA 1.010 1.005 - 1.030    Glucose, UA Negative Negative    Ketones, UA Negative Negative    Bilirubin, UA Negative Negative    Blood, UA Negative Negative    Protein, UA Negative Negative    Leuk Esterase, UA Large (3+) (A) Negative    Nitrite, UA Negative Negative    Urobilinogen, UA 1.0 E.U./dL 0.2 - 1.0 E.U./dL   Urinalysis, Microscopic Only - Urine, Clean Catch    Collection Time: 07/18/23  1:56 PM    Specimen: Urine, Clean Catch   Result Value Ref Range    RBC, UA 0-2 None Seen, 0-2 /HPF    WBC, UA 21-30 (A) None Seen, 0-2 /HPF    Bacteria, UA None Seen None Seen /HPF    Squamous Epithelial Cells, UA 3-6 (A) None Seen, 0-2 /HPF    Hyaline Casts, UA 0-2 None Seen /LPF    Methodology Automated Microscopy        Radiology  CT Head Without Contrast    Result Date: 7/18/2023  EMERGENCY CT SCAN OF THE HEAD WITHOUT CONTRAST ON 07/18/2023   CLINICAL HISTORY: Patient has a reported history of head trauma on 07/13/2023 where she had a mechanical fall hitting her head on the tub. Patient has pain behind right eye, some lightheadedness, difficulty finding words.  TECHNIQUE: Spiral CT images were obtained from the base of the skull to the vertex without intravenous contrast. The images were reformatted and are submitted in 3 mm thick axial, sagittal and coronal CT sections with brain algorithm and 2 mm thick axial CT sections with high-resolution bone algorithm.  This is correlated to prior outside head CT 07/13/2023.  FINDINGS: There is mild patchy low-density in the periventricular white matter consistent with mild small vessel disease. There is a 8 x 5 mm old lacunar infarct extending from the genu of the right internal capsule into the anterior right thalamus. The remainder of the brain parenchyma is normal in attenuation. The ventricles are normal in size. I see no focal mass effect. There is no midline shift. No extra-axial fluid collections are identified. There is no evidence of acute intracranial hemorrhage. No acute skull fracture is identified. The calvarium and skull base are normal in appearance. The paranasal sinuses and the mastoid air cells and middle ear cavities are clear.      1. No change when compared to the outside head CT on 07/13/2023, 5 days ago. 2. There is mild small vessel disease in the periventricular white matter. There is an 8 x 5 mm old lacunar infarct extending from the genu of the right internal capsule into the anterior right thalamus. The remainder of the head CT is within normal limits. Specifically, no acute skull fracture or intracranial hemorrhage is identified.  Radiation dose reduction techniques were utilized, including automated exposure control and exposure modulation based on body size.       XR Chest 1 View    Result Date: 7/18/2023  ONE VIEW PORTABLE CHEST  HISTORY: Confusion. Hypertension. Recent head  trauma.  FINDINGS: The lungs are well-expanded and clear. There is a small hiatus hernia unchanged from 04/01/2021. The heart remains borderline enlarged.  This report was finalized on 7/18/2023 12:46 PM by Dr. Prateek Garcia M.D.       Medical Decision Making:  ED Course as of 07/18/23 1452   e Jul 18, 2023   1402 CT Head Without Contrast  My independent interpretation of the CT of the head is no acute hemorrhage [TR]   1415 EKG          EKG time: 1209  Rhythm/Rate: Normal sinus, rate 72  P waves and NJ: Normal P, normal NJ  QRS, axis: Narrow QRS, left axis  ST and T waves: No acute    Independently Interpreted by me  Not significantly changed compared to prior 4/1/2021   [TR]   1426 Discussing with Dr. Ribeiro with stroke neurology.  She agrees to consult. [TR]   1426 I reviewed work-up and findings with the patient and family at the bedside.  Answered all questions.  Plan admission for MRI and neurology consult.  They are agreeable.  This could be postconcussive in nature however I am suspicious about a posterior circulation stroke. [TR]   1433 5 sutures removed from her right forehead without difficulty. [TR]   1449 Discussing with Dr. Borden with A.  She agrees to admit. [TR]   1452 Discussing with Dr. Galvez, neuroradiology who reports no acute findings.  The patient does have an old lacunar infarct. [TR]      ED Course User Index  [TR] Ihsan Brooks MD       The symptoms could be postconcussive in nature however I some suspicious about a posterior circulation stroke.  We will CT her head to rule intracranial hemorrhage.  We will discuss with stroke neurology.  She may end up needing an MRI and further work-up.  My differential diagnosis includes stroke, TIA, intracranial hemorrhage, concussion, and others.    Procedures:  Procedures            Diagnosis  Final diagnoses:   Confusion   Visual changes   Word finding difficulty   Visit for suture removal       Note Disclaimer: At [facility], we believe  that sharing information builds trust and better relationships. You are receiving this note because you recently visited [facility]. It is possible you will see health information before a provider has talked with you about it. This kind of information can be easy to misunderstand. To help you fully understand what it means for your health, we urge you to discuss this note with your provider.       Ihsan Brooks MD  07/18/23 2999       Ihsan Brooks MD  07/18/23 0999

## 2023-07-18 NOTE — ED NOTES
..Nursing report ED to floor  Samina Marin  72 y.o.  female    HPI :   Chief Complaint   Patient presents with    Altered Mental Status    Headache       Admitting doctor:   Jami Borden MD    Admitting diagnosis:   The primary encounter diagnosis was Follow-up exam. Diagnoses of Confusion, Visual changes, Word finding difficulty, and Visit for suture removal were also pertinent to this visit.    Code status:   Current Code Status       Date Active Code Status Order ID Comments User Context       Not on file            Allergies:   Meperidine    Isolation:   No active isolations    Intake and Output  No intake or output data in the 24 hours ending 07/18/23 1630    Weight:       07/18/23  1156   Weight: 99.3 kg (219 lb)       Most recent vitals:   Vitals:    07/18/23 1514 07/18/23 1529 07/18/23 1614 07/18/23 1626   BP:       BP Location:       Pulse: 74 78 78 75   Resp:       Temp:       TempSrc:       SpO2: 95% 94% 92%    Weight:       Height:           Active LDAs/IV Access:   Lines, Drains & Airways       Active LDAs       Name Placement date Placement time Site Days    Peripheral IV 07/18/23 1224 Posterior;Right Hand 07/18/23  1224  Hand  less than 1                    Labs (abnormal labs have a star):   Labs Reviewed   COMPREHENSIVE METABOLIC PANEL - Abnormal; Notable for the following components:       Result Value    Total Protein 5.7 (*)     All other components within normal limits    Narrative:     GFR Normal >60  Chronic Kidney Disease <60  Kidney Failure <15    The GFR formula is only valid for adults with stable renal function between ages 18 and 70.   SINGLE HSTROPONIN T - Abnormal; Notable for the following components:    HS Troponin T 10 (*)     All other components within normal limits    Narrative:     High Sensitive Troponin T Reference Range:  <10.0 ng/L- Negative Female for AMI  <15.0 ng/L- Negative Male for AMI  >=10 - Abnormal Female indicating possible myocardial injury.  >=15 -  Abnormal Male indicating possible myocardial injury.   Clinicians would have to utilize clinical acumen, EKG, Troponin, and serial changes to determine if it is an Acute Myocardial Infarction or myocardial injury due to an underlying chronic condition.        URINALYSIS W/ MICROSCOPIC IF INDICATED (NO CULTURE) - Abnormal; Notable for the following components:    Leuk Esterase, UA Large (3+) (*)     All other components within normal limits   URINALYSIS, MICROSCOPIC ONLY - Abnormal; Notable for the following components:    WBC, UA 21-30 (*)     Squamous Epithelial Cells, UA 3-6 (*)     All other components within normal limits   MAGNESIUM - Normal   CBC WITH AUTO DIFFERENTIAL - Normal   RAINBOW DRAW    Narrative:     The following orders were created for panel order [place] Draw.  Procedure                               Abnormality         Status                     ---------                               -----------         ------                     Green Top (Gel)[[id number]]                                  Final result               Lavender Top[[id number]]                                     Final result               Gold Top - SST[[id number]]                                                              Light Blue Top[[id number]]                                   Final result                 Please view results for these tests on the individual orders.   POCT GLUCOSE FINGERSTICK   CBC AND DIFFERENTIAL    Narrative:     The following orders were created for panel order CBC & Differential.  Procedure                               Abnormality         Status                     ---------                               -----------         ------                     CBC Auto Differential[[id number]]        Normal              Final result                 Please view results for these tests on the individual orders.   GREEN TOP   LAVENDER TOP   LIGHT BLUE TOP       EKG:   ECG 12 Lead ED Triage Standing Order; Weak / Dizzy  / AMS   Final Result   HEART RATE= 72  bpm   RR Interval= 833  ms   [place] Interval= 174  ms   P Horizontal Axis= 78  deg   P Front Axis= -29  deg   QRSD Interval= 108  ms   QT Interval= 419  ms   QRS Axis= -48  deg   T Wave Axis= 1  deg   - ABNORMAL ECG -   Sinus rhythm   Left anterior fascicular block   Anterior infarct, old   No change from previous tracing   Electronically Signed By: Melanie Jose ([facility]) 18-Jul-2023 14:11:34   Date and Time of Study: 2023-07-18 12:09:29          Meds given in ED:   Medications   sodium chloride 0.9 % flush 10 mL (has no administration in time range)       Imaging results:  CT Head Without Contrast    Result Date: 7/18/2023  1. No change when compared to the outside head CT on 07/13/2023, 5 days ago. 2. There is mild small vessel disease in the periventricular white matter. There is an 8 x 5 mm old lacunar infarct extending from the genu of the right internal capsule into the anterior right thalamus. The remainder of the head CT is within normal limits. Specifically, no acute skull fracture or intracranial hemorrhage is identified.  Radiation dose reduction techniques were utilized, including automated exposure control and exposure modulation based on body size.        Ambulatory status:   - 1 person standby assist    Social issues:   Social History     Socioeconomic History    Marital status:    Tobacco Use    Smoking status: Never    Smokeless tobacco: Never    Tobacco comments:     CAFFEINE USE   Vaping Use    Vaping Use: Never used   Substance and Sexual Activity    Alcohol use: Yes     Comment: RARE    Drug use: Never    Sexual activity: Defer       NIH Stroke Scale:       Minnie Bennett RN  07/18/23 16:30 EDT

## 2023-07-19 ENCOUNTER — APPOINTMENT (OUTPATIENT)
Dept: CT IMAGING | Facility: HOSPITAL | Age: 73
End: 2023-07-19
Payer: MEDICARE

## 2023-07-19 ENCOUNTER — APPOINTMENT (OUTPATIENT)
Dept: MRI IMAGING | Facility: HOSPITAL | Age: 73
End: 2023-07-19
Payer: MEDICARE

## 2023-07-19 ENCOUNTER — APPOINTMENT (OUTPATIENT)
Dept: CARDIOLOGY | Facility: HOSPITAL | Age: 73
End: 2023-07-19
Payer: MEDICARE

## 2023-07-19 PROBLEM — S09.90XA CLOSED HEAD INJURY: Status: ACTIVE | Noted: 2023-07-19

## 2023-07-19 PROBLEM — R29.90 STROKE-LIKE SYMPTOMS: Status: ACTIVE | Noted: 2023-07-19

## 2023-07-19 LAB
ALBUMIN SERPL-MCNC: 3.3 G/DL (ref 3.5–5.2)
ALBUMIN/GLOB SERPL: 1.4 G/DL
ALP SERPL-CCNC: 90 U/L (ref 39–117)
ALT SERPL W P-5'-P-CCNC: 8 U/L (ref 1–33)
ANION GAP SERPL CALCULATED.3IONS-SCNC: 8.6 MMOL/L (ref 5–15)
AORTIC DIMENSIONLESS INDEX: 0.5 (DI)
AST SERPL-CCNC: 13 U/L (ref 1–32)
BH CV ECHO MEAS - ACS: 2.32 CM
BH CV ECHO MEAS - AO MAX PG: 5 MMHG
BH CV ECHO MEAS - AO MEAN PG: 3.7 MMHG
BH CV ECHO MEAS - AO ROOT DIAM: 3 CM
BH CV ECHO MEAS - AO V2 MAX: 111.3 CM/SEC
BH CV ECHO MEAS - AO V2 VTI: 30.7 CM
BH CV ECHO MEAS - AVA(I,D): 1.75 CM2
BH CV ECHO MEAS - EDV(CUBED): 85.6 ML
BH CV ECHO MEAS - EDV(MOD-SP2): 125 ML
BH CV ECHO MEAS - EDV(MOD-SP4): 102 ML
BH CV ECHO MEAS - EF(MOD-BP): 65.6 %
BH CV ECHO MEAS - EF(MOD-SP2): 65.6 %
BH CV ECHO MEAS - EF(MOD-SP4): 64.7 %
BH CV ECHO MEAS - ESV(CUBED): 23.5 ML
BH CV ECHO MEAS - ESV(MOD-SP2): 43 ML
BH CV ECHO MEAS - ESV(MOD-SP4): 36 ML
BH CV ECHO MEAS - FS: 35 %
BH CV ECHO MEAS - IVS/LVPW: 0.91 CM
BH CV ECHO MEAS - IVSD: 1.04 CM
BH CV ECHO MEAS - LA DIMENSION: 2.9 CM
BH CV ECHO MEAS - LAT PEAK E' VEL: 5.3 CM/SEC
BH CV ECHO MEAS - LV DIASTOLIC VOL/BSA (35-75): 49.1 CM2
BH CV ECHO MEAS - LV MASS(C)D: 167 GRAMS
BH CV ECHO MEAS - LV MAX PG: 2.49 MMHG
BH CV ECHO MEAS - LV MEAN PG: 1.29 MMHG
BH CV ECHO MEAS - LV SYSTOLIC VOL/BSA (12-30): 17.3 CM2
BH CV ECHO MEAS - LV V1 MAX: 78.8 CM/SEC
BH CV ECHO MEAS - LV V1 VTI: 16.3 CM
BH CV ECHO MEAS - LVIDD: 4.4 CM
BH CV ECHO MEAS - LVIDS: 2.9 CM
BH CV ECHO MEAS - LVOT AREA: 3.3 CM2
BH CV ECHO MEAS - LVOT DIAM: 2.05 CM
BH CV ECHO MEAS - LVPWD: 1.14 CM
BH CV ECHO MEAS - MED PEAK E' VEL: 6 CM/SEC
BH CV ECHO MEAS - MV A DUR: 0.18 SEC
BH CV ECHO MEAS - MV A MAX VEL: 63.4 CM/SEC
BH CV ECHO MEAS - MV DEC SLOPE: 359.1 CM/SEC2
BH CV ECHO MEAS - MV DEC TIME: 169 MSEC
BH CV ECHO MEAS - MV E MAX VEL: 72.4 CM/SEC
BH CV ECHO MEAS - MV E/A: 1.14
BH CV ECHO MEAS - MV MAX PG: 2.15 MMHG
BH CV ECHO MEAS - MV MEAN PG: 1.22 MMHG
BH CV ECHO MEAS - MV P1/2T: 59.8 MSEC
BH CV ECHO MEAS - MV V2 VTI: 20.2 CM
BH CV ECHO MEAS - MVA(P1/2T): 3.7 CM2
BH CV ECHO MEAS - MVA(VTI): 2.7 CM2
BH CV ECHO MEAS - PA ACC TIME: 0.12 SEC
BH CV ECHO MEAS - PA V2 MAX: 82.8 CM/SEC
BH CV ECHO MEAS - PI END-D VEL: 141.2 CM/SEC
BH CV ECHO MEAS - PULM A REVS DUR: 0.14 SEC
BH CV ECHO MEAS - PULM A REVS VEL: 18.6 CM/SEC
BH CV ECHO MEAS - PULM DIAS VEL: 55.7 CM/SEC
BH CV ECHO MEAS - PULM S/D: 1.1
BH CV ECHO MEAS - PULM SYS VEL: 61.4 CM/SEC
BH CV ECHO MEAS - QP/QS: 1.08
BH CV ECHO MEAS - RAP SYSTOLE: 3 MMHG
BH CV ECHO MEAS - RV MAX PG: 1.02 MMHG
BH CV ECHO MEAS - RV V1 MAX: 50.6 CM/SEC
BH CV ECHO MEAS - RV V1 VTI: 12.4 CM
BH CV ECHO MEAS - RVOT DIAM: 2.44 CM
BH CV ECHO MEAS - RVSP: 39 MMHG
BH CV ECHO MEAS - SI(MOD-SP2): 39.4 ML/M2
BH CV ECHO MEAS - SI(MOD-SP4): 31.7 ML/M2
BH CV ECHO MEAS - SV(LVOT): 53.8 ML
BH CV ECHO MEAS - SV(MOD-SP2): 82 ML
BH CV ECHO MEAS - SV(MOD-SP4): 66 ML
BH CV ECHO MEAS - SV(RVOT): 57.9 ML
BH CV ECHO MEAS - TAPSE (>1.6): 2.36 CM
BH CV ECHO MEAS - TR MAX PG: 36 MMHG
BH CV ECHO MEAS - TR MAX VEL: 300 CM/SEC
BH CV ECHO MEASUREMENTS AVERAGE E/E' RATIO: 12.81
BH CV ECHO SHUNT ASSESSMENT PERFORMED (HIDDEN SCRIPTING): 1
BH CV XLRA - RV BASE: 2.9 CM
BH CV XLRA - RV LENGTH: 5.8 CM
BH CV XLRA - RV MID: 2.49 CM
BH CV XLRA - TDI S': 10 CM/SEC
BILIRUB SERPL-MCNC: 0.7 MG/DL (ref 0–1.2)
BUN SERPL-MCNC: 9 MG/DL (ref 8–23)
BUN/CREAT SERPL: 12.2 (ref 7–25)
CALCIUM SPEC-SCNC: 8.8 MG/DL (ref 8.6–10.5)
CHLORIDE SERPL-SCNC: 105 MMOL/L (ref 98–107)
CHOLEST SERPL-MCNC: 168 MG/DL (ref 0–200)
CO2 SERPL-SCNC: 27.4 MMOL/L (ref 22–29)
CREAT SERPL-MCNC: 0.74 MG/DL (ref 0.57–1)
DEPRECATED RDW RBC AUTO: 42.4 FL (ref 37–54)
EGFRCR SERPLBLD CKD-EPI 2021: 86.1 ML/MIN/1.73
ERYTHROCYTE [DISTWIDTH] IN BLOOD BY AUTOMATED COUNT: 13.7 % (ref 12.3–15.4)
GLOBULIN UR ELPH-MCNC: 2.3 GM/DL
GLUCOSE BLDC GLUCOMTR-MCNC: 102 MG/DL (ref 70–130)
GLUCOSE SERPL-MCNC: 102 MG/DL (ref 65–99)
HBA1C MFR BLD: 5.4 % (ref 4.8–5.6)
HCT VFR BLD AUTO: 38 % (ref 34–46.6)
HDLC SERPL-MCNC: 60 MG/DL (ref 40–60)
HGB BLD-MCNC: 12.5 G/DL (ref 12–15.9)
LDLC SERPL CALC-MCNC: 98 MG/DL (ref 0–100)
LDLC/HDLC SERPL: 1.64 {RATIO}
LEFT ATRIUM VOLUME INDEX: 29.5 ML/M2
MCH RBC QN AUTO: 27.9 PG (ref 26.6–33)
MCHC RBC AUTO-ENTMCNC: 32.9 G/DL (ref 31.5–35.7)
MCV RBC AUTO: 84.8 FL (ref 79–97)
PLATELET # BLD AUTO: 169 10*3/MM3 (ref 140–450)
PMV BLD AUTO: 9.2 FL (ref 6–12)
POTASSIUM SERPL-SCNC: 3.8 MMOL/L (ref 3.5–5.2)
PROT SERPL-MCNC: 5.6 G/DL (ref 6–8.5)
RBC # BLD AUTO: 4.48 10*6/MM3 (ref 3.77–5.28)
SINUS: 2.6 CM
SODIUM SERPL-SCNC: 141 MMOL/L (ref 136–145)
STJ: 2.6 CM
TRIGL SERPL-MCNC: 48 MG/DL (ref 0–150)
TSH SERPL DL<=0.05 MIU/L-ACNC: 1.86 UIU/ML (ref 0.27–4.2)
VLDLC SERPL-MCNC: 10 MG/DL (ref 5–40)
WBC NRBC COR # BLD: 5.32 10*3/MM3 (ref 3.4–10.8)

## 2023-07-19 PROCEDURE — 97165 OT EVAL LOW COMPLEX 30 MIN: CPT

## 2023-07-19 PROCEDURE — 99204 OFFICE O/P NEW MOD 45 MIN: CPT | Performed by: STUDENT IN AN ORGANIZED HEALTH CARE EDUCATION/TRAINING PROGRAM

## 2023-07-19 PROCEDURE — G0378 HOSPITAL OBSERVATION PER HR: HCPCS

## 2023-07-19 PROCEDURE — 84443 ASSAY THYROID STIM HORMONE: CPT | Performed by: INTERNAL MEDICINE

## 2023-07-19 PROCEDURE — 83036 HEMOGLOBIN GLYCOSYLATED A1C: CPT | Performed by: INTERNAL MEDICINE

## 2023-07-19 PROCEDURE — 70498 CT ANGIOGRAPHY NECK: CPT

## 2023-07-19 PROCEDURE — 70496 CT ANGIOGRAPHY HEAD: CPT

## 2023-07-19 PROCEDURE — 80053 COMPREHEN METABOLIC PANEL: CPT | Performed by: INTERNAL MEDICINE

## 2023-07-19 PROCEDURE — 70551 MRI BRAIN STEM W/O DYE: CPT

## 2023-07-19 PROCEDURE — 85027 COMPLETE CBC AUTOMATED: CPT | Performed by: INTERNAL MEDICINE

## 2023-07-19 PROCEDURE — 25510000001 IOPAMIDOL PER 1 ML: Performed by: HOSPITALIST

## 2023-07-19 PROCEDURE — 25510000001 PERFLUTREN (DEFINITY) 8.476 MG IN SODIUM CHLORIDE (PF) 0.9 % 10 ML INJECTION: Performed by: INTERNAL MEDICINE

## 2023-07-19 PROCEDURE — 93306 TTE W/DOPPLER COMPLETE: CPT | Performed by: INTERNAL MEDICINE

## 2023-07-19 PROCEDURE — 93306 TTE W/DOPPLER COMPLETE: CPT

## 2023-07-19 PROCEDURE — 97161 PT EVAL LOW COMPLEX 20 MIN: CPT

## 2023-07-19 PROCEDURE — 92523 SPEECH SOUND LANG COMPREHEN: CPT | Performed by: SPEECH-LANGUAGE PATHOLOGIST

## 2023-07-19 PROCEDURE — 80061 LIPID PANEL: CPT | Performed by: INTERNAL MEDICINE

## 2023-07-19 PROCEDURE — 82948 REAGENT STRIP/BLOOD GLUCOSE: CPT

## 2023-07-19 PROCEDURE — 97530 THERAPEUTIC ACTIVITIES: CPT

## 2023-07-19 RX ORDER — ATORVASTATIN CALCIUM 20 MG/1
40 TABLET, FILM COATED ORAL NIGHTLY
Status: DISCONTINUED | OUTPATIENT
Start: 2023-07-19 | End: 2023-07-20 | Stop reason: HOSPADM

## 2023-07-19 RX ORDER — ASPIRIN 81 MG/1
81 TABLET, CHEWABLE ORAL DAILY
Status: DISCONTINUED | OUTPATIENT
Start: 2023-07-19 | End: 2023-07-20 | Stop reason: HOSPADM

## 2023-07-19 RX ADMIN — ATORVASTATIN CALCIUM 40 MG: 20 TABLET, FILM COATED ORAL at 20:39

## 2023-07-19 RX ADMIN — DULOXETINE HYDROCHLORIDE 60 MG: 60 CAPSULE, DELAYED RELEASE ORAL at 08:52

## 2023-07-19 RX ADMIN — METOPROLOL TARTRATE 25 MG: 25 TABLET, FILM COATED ORAL at 13:18

## 2023-07-19 RX ADMIN — Medication 10 ML: at 20:39

## 2023-07-19 RX ADMIN — OXYBUTYNIN CHLORIDE 10 MG: 10 TABLET, EXTENDED RELEASE ORAL at 08:52

## 2023-07-19 RX ADMIN — IOPAMIDOL 95 ML: 755 INJECTION, SOLUTION INTRAVENOUS at 15:26

## 2023-07-19 RX ADMIN — METOPROLOL TARTRATE 25 MG: 25 TABLET, FILM COATED ORAL at 20:39

## 2023-07-19 RX ADMIN — PANTOPRAZOLE SODIUM 40 MG: 40 TABLET, DELAYED RELEASE ORAL at 04:42

## 2023-07-19 RX ADMIN — PERFLUTREN 2 ML: 6.52 INJECTION, SUSPENSION INTRAVENOUS at 07:49

## 2023-07-19 RX ADMIN — ASPIRIN 325 MG: 325 TABLET ORAL at 08:52

## 2023-07-19 RX ADMIN — ACETAMINOPHEN 650 MG: 325 TABLET, FILM COATED ORAL at 04:42

## 2023-07-19 NOTE — THERAPY DISCHARGE NOTE
Patient Name: Samina Marin  : 1950    MRN: [id number]                              Today's Date: 2023       Admit Date: 2023    Visit Dx:     ICD-10-CM ICD-9-CM   1. Follow-up exam  Z09 V67.9   2. Confusion  R41.0 298.9   3. Visual changes  H53.9 368.9   4. Word finding difficulty  R47.89 V40.1   5. Visit for suture removal  Z48.02 V58.32     Patient Active Problem List   Diagnosis    Allergic rhinitis    Aortic valve regurgitation    Arthritis    Asthma    Carpal tunnel syndrome, bilateral    DDD (degenerative disc disease), lumbar    Esophageal reflux    Essential hypertension    Fibromyalgia    Major depressive disorder    Mixed stress and urge urinary incontinence    Mood disorder    Obesity    Paroxysmal supraventricular tachycardia    Postmenopausal    Shortness of breath    Skin cancer    Vitamin D deficiency    Angina pectoris    Lumbar radiculopathy    Trochanteric bursitis of right hip    Muscle spasm    Thoracic spine pain    Lumbar facet arthropathy    Acute focal neurological deficit     Past Medical History:   Diagnosis Date    Allergic rhinitis     Chronic bronchitis     Hypertension     Perianal abscess     Sinus bradycardia      Past Surgical History:   Procedure Laterality Date    BACK SURGERY      CARDIAC CATHETERIZATION N/A 2021    Procedure: Left Heart Cath;  Surgeon: Marily Maya MD;  Location:  [facility];  Service: Cardiology;  Laterality: N/A;    CARDIAC CATHETERIZATION N/A 2021    Procedure: Coronary angiography;  Surgeon: Marily Maya MD;  Location:  [facility];  Service: Cardiology;  Laterality: N/A;    CARDIAC CATHETERIZATION N/A 2021    Procedure: Left ventriculography;  Surgeon: Marily Maya MD;  Location:  [facility];  Service: Cardiology;  Laterality: N/A;    CARDIAC ELECTROPHYSIOLOGY STUDY AND ABLATION      EPIDURAL Right 2022    Procedure: LUMBAR/SACRAL TRANSFORAMINAL EPIDURAL  RIGHT L4, L5;  Surgeon: Jonelle Abraham MD;  Location: [facility];  Service: Pain Management;  Laterality: Right;    HYSTERECTOMY      MEDIAL BRANCH BLOCK Bilateral 1/4/2023    Procedure: LUMBAR MEDIAL BRANCH BLOCK BILATERAL L3-L5 #1;  Surgeon: Jonelle Abraham MD;  Location: [facility];  Service: Pain Management;  Laterality: Bilateral;    ROTATOR CUFF REPAIR Right     TENDON REPAIR Right     SHOULDER      General Information       Row Name 07/19/23 0950          Physical Therapy Time and Intention    Document Type discharge evaluation/summary  -CW     Mode of Treatment physical therapy  -CW       Row Name 07/19/23 0950          General Information    Patient Profile Reviewed yes  -CW     Prior Level of Function independent:;transfer;all household mobility;community mobility;work  works part time as a bus   -CW     Existing Precautions/Restrictions fall  -CW     Barriers to Rehab none identified  -CW       Row Name 07/19/23 0950          Living Environment    People in Home alone;other (see comments)  can have assist from son  -CW       Row Name 07/19/23 0950          Home Main Entrance    Number of Stairs, Main Entrance none  -CW       Row Name 07/19/23 0950          Stairs Within Home, Primary    Number of Stairs, Within Home, Primary none  -CW       Row Name 07/19/23 0950          Cognition    Orientation Status (Cognition) oriented x 4  -CW               User Key  (r) = Recorded By, (t) = Taken By, (c) = Cosigned By      Initials Name Provider Type    CW Frances Fisher, PT Physical Therapist                   Mobility       Row Name 07/19/23 0967          Bed Mobility    Bed Mobility sit-supine  -CW     Sit-Supine [place] (Bed Mobility) modified independence  -CW     Assistive Device (Bed Mobility) head of bed elevated  -CW     Comment, (Bed Mobility) supine>sit NT, pt up ambulating in hallway to BR at start of eval  -CW       Row Name 07/19/23 0964          Bed-Chair Transfer     Bed-Chair [place] (Transfers) independent  -CW       Row Name 07/19/23 0951          Sit-Stand Transfer    Sit-Stand [place] (Transfers) independent  -CW       Row Name 07/19/23 0951          Gait/Stairs (Locomotion)    [place] Level (Gait) independent  -CW     Assistive Device (Gait) other (see comments)  none  -CW     Distance in Feet (Gait) 100'  -CW     Deviations/Abnormal Patterns (Gait) gait speed decreased  -CW               User Key  (r) = Recorded By, (t) = Taken By, (c) = Cosigned By      Initials Name Provider Type    Frances Hillman PT Physical Therapist                   Obj/Interventions       Row Name 07/19/23 0952          Range of Motion Comprehensive    General Range of Motion bilateral lower extremity ROM WFL  -CW       [facility] Name 07/19/23 0952          Strength Comprehensive (MMT)    General Manual Muscle Testing (MMT) Assessment no strength deficits identified  -CW       [facility] Name 07/19/23 0952          Balance    Balance Assessment standing dynamic balance;standing static balance  -CW     Static Standing Balance independent  -CW     Dynamic Standing Balance independent  -CW     Position/Device Used, Standing Balance unsupported  -CW       [facility] Name 07/19/23 0952          Sensory Assessment (Somatosensory)    Sensory Assessment (Somatosensory) sensation intact  -CW               User Key  (r) = Recorded By, (t) = Taken By, (c) = Cosigned By      Initials Name Provider Type    Frances Hillman PT Physical Therapist                   Goals/Plan    No documentation.                  Clinical Impression       [facility] Name 07/19/23 0952          Pain    Pretreatment Pain Rating 0/10 - no pain  -CW     Posttreatment Pain Rating 0/10 - no pain  -CW       [facility] Name 07/19/23 0952          Plan of Care Review    Plan of Care Reviewed With patient;son  -     Outcome Evaluation Pt admitted with confusion and word finding difficulty. Pt with recent fall on 7/13 in [place] when getting  into a bathtub, pt hit her head. Initial CT negative. Today, pt was observed ambulating in hallway to bathroom with son present and agreeable to formal PT evaluation. She reports independence at baseline without any DME and works part time as a . Today, she completed bed mobility, transfers and ambulated 100' independently without AD. No overt balance deficits noted and no new unilateral strength deficits. No indication for skilled PT in the acute setting. Discussed with pt and family the rec of having increased assist/supervision at home for a few days for safety. Plans to return home. PT sifning off.  -CW       Row Name 07/19/23 0952          Therapy Assessment/Plan (PT)    Criteria for Skilled Interventions Met (PT) no  -CW     Therapy Frequency (PT) evaluation only  -CW       Row Name 07/19/23 0952          Vital Signs    O2 Delivery Pre Treatment room air  -CW     O2 Delivery Intra Treatment room air  -CW     O2 Delivery Post Treatment room air  -CW       Row Name 07/19/23 0952          Positioning and Restraints    Pre-Treatment Position other (comment)  in hallway  -CW     Post Treatment Position other  with transport  -CW     Other Position with other staff  -CW               User Key  (r) = Recorded By, (t) = Taken By, (c) = Cosigned By      Initials Name Provider Type    CW Frances Fisher, PT Physical Therapist                   Outcome Measures       Row Name 07/19/23 0959          How much help from another person do you currently need...    Turning from your back to your side while in flat bed without using bedrails? 4  -CW     Moving from lying on back to sitting on the side of a flat bed without bedrails? 4  -CW     Moving to and from a bed to a chair (including a wheelchair)? 4  -CW     Standing up from a chair using your arms (e.g., wheelchair, bedside chair)? 4  -CW     Climbing 3-5 steps with a railing? 4  -CW     To walk in hospital room? 4  -CW     AM-PAC 6 Clicks Score (PT) 24   -CW     Highest level of mobility 8 --> Walked 250 feet or more  -       Row Name 07/19/23 0959          Functional Assessment    Outcome Measure Options AM-PAC 6 Clicks Basic Mobility (PT)  -CW               User Key  (r) = Recorded By, (t) = Taken By, (c) = Cosigned By      Initials Name Provider Type    CW Frances Fisher PT Physical Therapist                  Physical Therapy Education       Title: PT OT SLP Therapies (In Progress)       Topic: Physical Therapy (In Progress)       Point: Mobility training (Done)       Learning Progress Summary             Patient Acceptance, E, VU by CW at 7/19/2023 0959                         Point: Home exercise program (Not Started)       Learner Progress:  Not documented in this visit.              Point: Body mechanics (Not Started)       Learner Progress:  Not documented in this visit.              Point: Precautions (Not Started)       Learner Progress:  Not documented in this visit.                              User Key       Initials Effective Dates Name Provider Type Discipline     12/13/22 -  Frances Fisher PT Physical Therapist PT                  PT Recommendation and Plan     Plan of Care Reviewed With: patient, son  Outcome Evaluation: Pt admitted with confusion and word finding difficulty. Pt with recent fall on 7/13 in [place] when getting into a bathtub, pt hit her head. Initial CT negative. Today, pt was observed ambulating in hallway to bathroom with son present and agreeable to formal PT evaluation. She reports independence at baseline without any DME and works part time as a . Today, she completed bed mobility, transfers and ambulated 100' independently without AD. No overt balance deficits noted and no new unilateral strength deficits. No indication for skilled PT in the acute setting. Discussed with pt and family the rec of having increased assist/supervision at home for a few days for safety. Plans to return home. PT  sifning off.     Time Calculation:         PT Charges       Row Name 07/19/23 1001             Time Calculation    Start Time 0934  -CW      Stop Time 0946  -CW      Time Calculation (min) 12 min  -CW      PT Received On 07/19/23  -CW         Time Calculation- PT    Total Timed Code Minutes- PT 8 minute(s)  -CW         Timed Charges    71535 - PT Therapeutic Activity Minutes 8  -CW         Total Minutes    Timed Charges Total Minutes 8  -CW       Total Minutes 8  -CW                User Key  (r) = Recorded By, (t) = Taken By, (c) = Cosigned By      Initials Name Provider Type    CW Frances Fisher, PT Physical Therapist                  Therapy Charges for Today       Code Description Service Date Service Provider Modifiers Qty    35081304810 HC PT EVAL LOW COMPLEXITY 3 7/19/2023 Frances Fisher, PT GP 1    16146869345 HC PT THERAPEUTIC ACT EA 15 MIN 7/19/2023 Frances Fisher, PT GP 1            PT G-Codes  Outcome Measure Options: AM-PAC 6 Clicks Basic Mobility (PT)  AM-PAC 6 Clicks Score (PT): 24    PT Discharge Summary  Anticipated Discharge Disposition (PT): home with assist, home    Frances Fisher PT  7/19/2023

## 2023-07-19 NOTE — PLAN OF CARE
Goal Outcome Evaluation:  Plan of Care Reviewed With: patient, family           Outcome Evaluation: Orders received, chart reviewed.  Pt tolerating regular diet/thin liquids with no complaints.  Functional receptive/expressive language.  Bedside Aphasia Score 98.3; Bedside Language Score 75 ( descrepancy d/t reading comprehension/memory and lack of writing details in picture description previously given in verbal expression task).  Delayed memory impaired with recall of only 1/4 words following a 5 minute delay.  Recommend cogntive evaluation.

## 2023-07-19 NOTE — PLAN OF CARE
Goal Outcome Evaluation:  VS  Problem: Fall Injury Risk  Goal: Absence of Fall and Fall-Related Injury  Outcome: Ongoing, Progressing  Intervention: Promote Injury-Free Environment  Recent Flowsheet Documentation  Taken 7/19/2023 0203 by Eloina Kennedy RN  Safety Promotion/Fall Prevention:   room organization consistent   safety round/check completed   nonskid shoes/slippers when out of bed  Taken 7/19/2023 0001 by Eloina Kennedy RN  Safety Promotion/Fall Prevention:   safety round/check completed   room organization consistent   nonskid shoes/slippers when out of bed  Taken 7/18/2023 2204 by Eloina Kennedy RN  Safety Promotion/Fall Prevention:   room organization consistent   safety round/check completed   nonskid shoes/slippers when out of bed  Taken 7/18/2023 2013 by Eloina Kennedy RN  Safety Promotion/Fall Prevention:   nonskid shoes/slippers when out of bed   room organization consistent   safety round/check completed

## 2023-07-19 NOTE — PROGRESS NOTES
Name: Samina Marin ADMIT: 2023   : 1950  PCP: Jonelle Joseph APRN    MRN: [id number] LOS: 0 days   AGE/SEX: 72 y.o. female  ROOM: 125/1     Subjective   Subjective   She still has vision in left eye below baseline but no acute change. Speech is normal. Headache this am better with analgesics no other new complaints.        Objective   Objective   Vital Signs  Temp:  [97 °F (36.1 °C)-98.1 °F (36.7 °C)] 98.1 °F (36.7 °C)  Heart Rate:  [70-89] 70  Resp:  [16-18] 18  BP: (124-151)/(60-91) 124/74  SpO2:  [90 %-97 %] 96 %  on  Flow (L/min):  [2] 2;   Device (Oxygen Therapy): nasal cannula  Body mass index is 36.44 kg/m².  Physical Exam  Vitals and nursing note reviewed.   Constitutional:       General: She is not in acute distress.     Appearance: She is well-developed.   HENT:      Head: Normocephalic and atraumatic.      Nose: Nose normal.   Eyes:      Extraocular Movements: Extraocular movements intact.      Conjunctiva/sclera: Conjunctivae normal.      Pupils: Pupils are equal, round, and reactive to light.   Neck:      Thyroid: No thyromegaly.      Trachea: No tracheal deviation.   Cardiovascular:      Rate and Rhythm: Normal rate and regular rhythm.      Heart sounds: Normal heart sounds. No murmur heard.    No friction rub. No gallop.   Pulmonary:      Effort: Pulmonary effort is normal. No respiratory distress.      Breath sounds: Normal breath sounds. No wheezing or rales.   Abdominal:      General: Bowel sounds are normal. There is no distension or abdominal bruit.      Palpations: Abdomen is soft. Abdomen is not rigid. There is no shifting dullness, fluid wave, mass or pulsatile mass.      Tenderness: There is no abdominal tenderness. There is no guarding.      Hernia: No hernia is present.   Musculoskeletal:         General: Normal range of motion.      Cervical back: Normal range of motion and neck supple.   Skin:     General: Skin is warm and dry.   Neurological:      General: No focal  deficit present.      Mental Status: She is alert and oriented to person, place, and time. Mental status is at baseline.      Cranial Nerves: No cranial nerve deficit.      Sensory: No sensory deficit.      Motor: No weakness.      Comments: Periorbital ecchymosis right    Psychiatric:         Behavior: Behavior normal.         Thought Content: Thought content normal.     Results Review     I reviewed the patient's new clinical results.  Results from last 7 days   Lab Units 07/18/23  1222   WBC 10*3/mm3 4.68   HEMOGLOBIN g/dL 12.2   PLATELETS 10*3/mm3 162     Results from last 7 days   Lab Units 07/18/23  1222   SODIUM mmol/L 142   POTASSIUM mmol/L 3.9   CHLORIDE mmol/L 107   CO2 mmol/L 27.7   BUN mg/dL 8   CREATININE mg/dL 0.64   GLUCOSE mg/dL 98   EGFR mL/min/1.73 94.0     Results from last 7 days   Lab Units 07/18/23  1222   ALBUMIN g/dL 3.5   BILIRUBIN mg/dL 0.6   ALK PHOS U/L 96   AST (SGOT) U/L 18   ALT (SGPT) U/L 6     Results from last 7 days   Lab Units 07/18/23  1222   CALCIUM mg/dL 8.9   ALBUMIN g/dL 3.5   MAGNESIUM mg/dL 1.9       Glucose   Date/Time Value Ref Range Status   07/19/2023 0006 102 70 - 130 mg/dL Final     Comment:     Meter: [id number] : [id number] Maribel BROWER   07/18/2023 1940 109 70 - 130 mg/dL Final     Comment:     Meter: [id number] : [id number] Maribel BROWER       CT Head Without Contrast    Result Date: 7/18/2023  1. No change when compared to the outside head CT on 07/13/2023, 5 days ago. 2. There is mild small vessel disease in the periventricular white matter. There is an 8 x 5 mm old lacunar infarct extending from the genu of the right internal capsule into the anterior right thalamus. The remainder of the head CT is within normal limits. Specifically, no acute skull fracture or intracranial hemorrhage is identified.  Radiation dose reduction techniques were utilized, including automated exposure control and exposure modulation based on body size.  This report was  finalized on 7/18/2023 5:38 PM by Dr. Joon Galvez M.D.     I have personally reviewed all medications:  Scheduled Medications  aspirin, 325 mg, Oral, Daily   Or  aspirin, 300 mg, Rectal, Daily  atorvastatin, 80 mg, Oral, Nightly  DULoxetine, 60 mg, Oral, Daily  metoprolol tartrate, 25 mg, Oral, BID  oxybutynin XL, 10 mg, Oral, Daily  pantoprazole, 40 mg, Oral, Q AM    Infusions  sodium chloride, 75 mL/hr, Last Rate: 75 mL/hr (07/19/23 0623)    Diet  Diet: Cardiac Diets; Healthy Heart (2-3 Na+); Texture: Regular Texture (IDDSI 7); Fluid Consistency: Thin (IDDSI 0)    I have personally reviewed:  [x]  Laboratory   [x]  Microbiology   [x]  Radiology   [x]  EKG/Telemetry  [x]  Cardiology/Vascular   []  Pathology    []  Records       Assessment/Plan     Active Hospital Problems    Diagnosis  POA    **Acute focal neurological deficit [R29.818]  Yes    Closed head injury [S09.90XA]  Yes    Stroke-like symptoms [R29.90]  Unknown    Essential hypertension [I10]  Yes    Aortic valve regurgitation [I35.1]  Yes    Fibromyalgia [M79.7]  Yes      Resolved Hospital Problems   No resolved problems to display.       72 y.o. female admitted with Acute focal neurological deficit.     Closed head injury/Stroke like symptoms   vision change left eye and altered speech but better/ resolved.   Await Neuro input. MRI with old CVA but no acute process.   Await TSH A1c and lipid panel.  Mechanical Fall occurred in [place] with negative work up.      HTN- BP stable on current regimen    GERD- pepcid    Aortic valve regurgitation - asymptomatic . Echo EF61-65%  .     SCDs for DVT prophylaxis.  Full code.  Discussed with patient, family, and nursing staff.  Anticipate discharge home with family  today or tmrw pending neurology consult.       HORTENSIA Timmons  [facility]  07/19/23  10:06 EDT

## 2023-07-19 NOTE — THERAPY EVALUATION
Acute Care - Speech Language Pathology   Swallow Initial Evaluation [facility]     Patient Name: Samina Marin  : 1950  MRN: [id number]  Today's Date: 2023               Admit Date: 2023    Visit Dx:     ICD-10-CM ICD-9-CM   1. Follow-up exam  Z09 V67.9   2. Confusion  R41.0 298.9   3. Visual changes  H53.9 368.9   4. Word finding difficulty  R47.89 V40.1   5. Visit for suture removal  Z48.02 V58.32     Patient Active Problem List   Diagnosis    Allergic rhinitis    Aortic valve regurgitation    Arthritis    Asthma    Carpal tunnel syndrome, bilateral    DDD (degenerative disc disease), lumbar    Esophageal reflux    Essential hypertension    Fibromyalgia    Major depressive disorder    Mixed stress and urge urinary incontinence    Mood disorder    Obesity    Paroxysmal supraventricular tachycardia    Postmenopausal    Shortness of breath    Skin cancer    Vitamin D deficiency    Angina pectoris    Lumbar radiculopathy    Trochanteric bursitis of right hip    Muscle spasm    Thoracic spine pain    Lumbar facet arthropathy    Acute focal neurological deficit    Closed head injury    Stroke-like symptoms     Past Medical History:   Diagnosis Date    Allergic rhinitis     Chronic bronchitis     Hypertension     Perianal abscess     Sinus bradycardia      Past Surgical History:   Procedure Laterality Date    BACK SURGERY      CARDIAC CATHETERIZATION N/A 2021    Procedure: Left Heart Cath;  Surgeon: Marily Maya MD;  Location: [facility] CATH INVASIVE LOCATION;  Service: Cardiology;  Laterality: N/A;    CARDIAC CATHETERIZATION N/A 2021    Procedure: Coronary angiography;  Surgeon: Marily Maya MD;  Location:  [facility];  Service: Cardiology;  Laterality: N/A;    CARDIAC CATHETERIZATION N/A 2021    Procedure: Left ventriculography;  Surgeon: Marily Maya MD;  Location:  [facility];  Service: Cardiology;  Laterality: N/A;    CARDIAC  ELECTROPHYSIOLOGY STUDY AND ABLATION      EPIDURAL Right 11/7/2022    Procedure: LUMBAR/SACRAL TRANSFORAMINAL EPIDURAL RIGHT L4, L5;  Surgeon: Jonelle Abraham MD;  Location: [facility];  Service: Pain Management;  Laterality: Right;    HYSTERECTOMY      MEDIAL BRANCH BLOCK Bilateral 1/4/2023    Procedure: LUMBAR MEDIAL BRANCH BLOCK BILATERAL L3-L5 #1;  Surgeon: Jonelle Abraham MD;  Location: [facility];  Service: Pain Management;  Laterality: Bilateral;    ROTATOR CUFF REPAIR Right     TENDON REPAIR Right     SHOULDER       SLP Recommendation and Plan                                   Predicted Duration Therapy Intervention (Days): until discharge (07/19/23 0830)                                              Plan of Care Reviewed With: patient, family  Outcome Evaluation: Orders received, chart reviewed.  Pt tolerating regular diet/thin liquids with no complaints.  Functional receptive/expressive language.  Bedside Aphasia Score 98.3; Bedside Language Score 75 ( descrepancy d/t reading comprehension/memory and lack of writing details in picture description previously given in verbal expression task).  Delayed memory impaired with recall of only 1/4 words following a 5 minute delay.  Recommend cogntive evaluation.          EDUCATION  The patient has been educated in the following areas:   Cognitive Impairment Communication Impairment.        SLP GOALS       Row Name 07/19/23 0830             SLP Diagnostic Treatment     Patient will participate in further assessment in the following areas cognitive-linguistic  -SA      Time Frame (Diagnostic) by discharge  -                User Key  (r) = Recorded By, (t) = Taken By, (c) = Cosigned By      Initials Name Provider Type    Mireille Saunders MS CCC-SLP Speech and Language Pathologist                       Time Calculation:    Time Calculation- SLP       Row Name 07/19/23 4970             Time Calculation- SLP    SLP Start Time 0830  -      SLP Received On  07/19/23  -                User Key  (r) = Recorded By, (t) = Taken By, (c) = Cosigned By      Initials Name Provider Type    Mireille Saunders MS CCC-SLP Speech and Language Pathologist                    Therapy Charges for Today       Code Description Service Date Service Provider Modifiers Qty    [id number]  ST EVAL SPEECH AND PROD W LANG  5 7/19/2023 Mireille Barba MS CCC-SLP GN 1                 MS CHAPIN Lester  7/19/2023

## 2023-07-19 NOTE — PLAN OF CARE
Goal Outcome Evaluation:  Plan of Care Reviewed With: patient, son           Outcome Evaluation: Pt admitted with confusion and word finding difficulty. Pt with recent fall on 7/13 in [place] when getting into a bathtub, pt hit her head. Initial CT negative. Today, pt was observed ambulating in hallway to bathroom with son present and agreeable to formal PT evaluation. She reports independence at baseline without any DME and works part time as a . Today, she completed bed mobility, transfers and ambulated 100' independently without AD. No overt balance deficits noted and no new unilateral strength deficits. No indication for skilled PT in the acute setting. Discussed with pt and family the rec of having increased assist/supervision at home for a few days for safety. Plans to return home. PT sifning off.      Anticipated Discharge Disposition (PT): home with assist, home

## 2023-07-19 NOTE — H&P
HISTORY AND PHYSICAL   [facility]        Date of Admission: 2023  Patient Identification:  Name: Samina Marin  Age: 72 y.o.  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[place]; she fell and hit her head and was seen there and released; since then she has not felt well; no nausea or vomiting    History of Present Illness:   As above    Past Medical History:  Past Medical History:   Diagnosis Date    Allergic rhinitis     Chronic bronchitis     Hypertension     Perianal abscess     Sinus bradycardia      Past Surgical History:  Past Surgical History:   Procedure Laterality Date    BACK SURGERY      CARDIAC CATHETERIZATION N/A 2021    Procedure: Left Heart Cath;  Surgeon: Marily Maya MD;  Location: [facility] CATH INVASIVE LOCATION;  Service: Cardiology;  Laterality: N/A;    CARDIAC CATHETERIZATION N/A 2021    Procedure: Coronary angiography;  Surgeon: Marily Maya MD;  Location:  [facility] CATH INVASIVE LOCATION;  Service: Cardiology;  Laterality: N/A;    CARDIAC CATHETERIZATION N/A 2021    Procedure: Left ventriculography;  Surgeon: Marily Maya MD;  Location:  [facility] CATH INVASIVE LOCATION;  Service: Cardiology;  Laterality: N/A;    CARDIAC ELECTROPHYSIOLOGY STUDY AND ABLATION      EPIDURAL Right 2022    Procedure: LUMBAR/SACRAL TRANSFORAMINAL EPIDURAL RIGHT L4, L5;  Surgeon: Jonelle Abraham MD;  Location: [facility] MAIN OR;  Service: Pain Management;  Laterality: Right;    HYSTERECTOMY      MEDIAL BRANCH BLOCK Bilateral 2023    Procedure: LUMBAR MEDIAL BRANCH BLOCK BILATERAL L3-L5 #1;  Surgeon: Jonelle Abraham MD;  Location: [facility] MAIN OR;  Service: Pain Management;  Laterality: Bilateral;    ROTATOR CUFF REPAIR Right      TENDON REPAIR Right     SHOULDER      Home Meds:  Medications Prior to Admission   Medication Sig Dispense Refill Last Dose    aspirin 81 MG EC tablet Take 1 tablet by mouth Daily. 30 tablet 0 7/18/2023    cetirizine (zyrTEC) 10 MG tablet Take 1 tablet by mouth Daily. 30 tablet 0 Past Week    diclofenac (VOLTAREN) 50 MG EC tablet Take 1 tablet by mouth 2 (Two) Times a Day. 60 tablet 0 7/18/2023    DULoxetine (CYMBALTA) 60 MG capsule Take 1 capsule by mouth Daily. 30 capsule 0 7/18/2023    esomeprazole (nexIUM) 40 MG capsule Take 1 capsule by mouth Daily. 30 capsule 0 7/18/2023    metoprolol tartrate (LOPRESSOR) 25 MG tablet Take 1 tablet by mouth 2 (Two) Times a Day. 60 tablet 0 7/18/2023    tolterodine (DETROL) 2 MG tablet Take 1 tablet by mouth 2 (Two) Times a Day. 60 tablet 0 7/18/2023       Allergies:  Allergies   Allergen Reactions    Meperidine Rash     Immunizations:  Immunization History   Administered Date(s) Administered    COVID-19 (MODERNA) 1st,2nd,3rd Dose Monovalent 09/21/2021, 10/21/2021    COVID-19 (UNSPECIFIED) 12/01/2021     Social History:   Social History     Social History Narrative    Not on file     Social History     Socioeconomic History    Marital status:    Tobacco Use    Smoking status: Never     Passive exposure: Never    Smokeless tobacco: Never    Tobacco comments:     CAFFEINE USE   Vaping Use    Vaping Use: Never used   Substance and Sexual Activity    Alcohol use: Yes     Comment: RARE    Drug use: Never    Sexual activity: Defer       Family History:  Family History   Problem Relation Age of Onset    Heart disease Sister     Ovarian cancer Sister     Liver cancer Sister     Heart disease Brother     Hypertension Sister     Pancreatic cancer Sister         Review of Systems  See history of present illness and past medical history.  Patient denies headache, dizziness, syncope , change in hearing, change in taste, changes in weight, changes in appetite, focal weakness,  "numbness, or paresthesia.  Patient denies chest pain, palpitations, dyspnea, orthopnea, PND, cough, sinus pressure, rhinorrhea, epistaxis, hemoptysis, nausea, vomiting,hematemesis, diarrhea, constipation or hematochezia.  Denies cold or heat intolerance, polydipsia, polyuria, polyphagia. Denies hematuria, pyuria, dysuria, hesitancy, frequency or urgency. Denies consumption of raw and under cooked meats foods or change in water source.  Denies fever, chills, sweats, night sweats.  Denies missing any routine medications. Remainder of ROS is negative.    Objective:  T Max 24 hrs: Temp (24hrs), Av.7 °F (36.5 °C), Min:97 °F (36.1 °C), Max:98.1 °F (36.7 °C)    Vitals Ranges:   Temp:  [97 °F (36.1 °C)-98.1 °F (36.7 °C)] 97.9 °F (36.6 °C)  Heart Rate:  [70-89] 76  Resp:  [16-18] 18  BP: (126-151)/(60-91) 147/84      Exam:  /84 (BP Location: Left arm, Patient Position: Lying)   Pulse 76   Temp 97.9 °F (36.6 °C) (Oral)   Resp 18   Ht 165.1 cm (65\")   Wt 99.3 kg (219 lb)   SpO2 97%   BMI 36.44 kg/m²     General Appearance:    Alert, cooperative, no distress, appears stated age   Head:    Normocephalic, without obvious abnormality, atraumatic   Eyes:    PERRL, conjunctivae/corneas clear, EOM's intact, both eyes   Ears:    Normal external ear canals, both ears   Nose:   Nares normal, septum midline, mucosa normal, no drainage    or sinus tenderness   Throat:   Lips, mucosa, and tongue normal   Neck:   Supple, symmetrical, trachea midline, no adenopathy;     thyroid:  no enlargement/tenderness/nodules; no carotid    bruit or JVD   Back:     Symmetric, no curvature, ROM normal, no CVA tenderness   Lungs:     Decreased breath sounds bilaterally, respirations unlabored   Chest Wall:    No tenderness or deformity    Heart:    Regular rate and rhythm, S1 and S2 normal, no murmur, rub   or gallop   Abdomen:     Soft, nontender, bowel sounds active all four quadrants,     no masses, no hepatomegaly, no splenomegaly "   Extremities:   Extremities normal, atraumatic, no cyanosis or edema      .    Data Review:  Labs in chart were reviewed.  WBC   Date Value Ref Range Status   07/18/2023 4.68 3.40 - 10.80 10*3/mm3 Final     Hemoglobin   Date Value Ref Range Status   07/18/2023 12.2 12.0 - 15.9 g/dL Final     Hematocrit   Date Value Ref Range Status   07/18/2023 37.2 34.0 - 46.6 % Final     Platelets   Date Value Ref Range Status   07/18/2023 162 140 - 450 10*3/mm3 Final     Sodium   Date Value Ref Range Status   07/18/2023 142 136 - 145 mmol/L Final     Potassium   Date Value Ref Range Status   07/18/2023 3.9 3.5 - 5.2 mmol/L Final     Comment:     Slight hemolysis detected by analyzer. Results may be affected.     Chloride   Date Value Ref Range Status   07/18/2023 107 98 - 107 mmol/L Final     CO2   Date Value Ref Range Status   07/18/2023 27.7 22.0 - 29.0 mmol/L Final     BUN   Date Value Ref Range Status   07/18/2023 8 8 - 23 mg/dL Final     Creatinine   Date Value Ref Range Status   07/18/2023 0.64 0.57 - 1.00 mg/dL Final     Glucose   Date Value Ref Range Status   07/18/2023 98 65 - 99 mg/dL Final     Calcium   Date Value Ref Range Status   07/18/2023 8.9 8.6 - 10.5 mg/dL Final     Magnesium   Date Value Ref Range Status   07/18/2023 1.9 1.6 - 2.4 mg/dL Final     AST (SGOT)   Date Value Ref Range Status   07/18/2023 18 1 - 32 U/L Final     ALT (SGPT)   Date Value Ref Range Status   07/18/2023 6 1 - 33 U/L Final     Alkaline Phosphatase   Date Value Ref Range Status   07/18/2023 96 39 - 117 U/L Final                Imaging Results (All)       Procedure Component Value Units Date/Time    CT Head Without Contrast [[id number]] Collected: 07/18/23 1404     Updated: 07/18/23 1741    Narrative:      EMERGENCY CT SCAN OF THE HEAD WITHOUT CONTRAST ON 07/18/2023     CLINICAL HISTORY: Patient has a reported history of head trauma on  07/13/2023 where she had a mechanical fall hitting her head on the tub.  Patient has pain behind right  eye, some lightheadedness, difficulty  finding words.      TECHNIQUE: Spiral CT images were obtained from the base of the skull to  the vertex without intravenous contrast. The images were reformatted and  are submitted in 3 mm thick axial, sagittal and coronal CT sections with  brain algorithm and 2 mm thick axial CT sections with high-resolution  bone algorithm.     This is correlated to prior outside head CT 07/13/2023.     FINDINGS: There is mild patchy low-density in the periventricular white  matter consistent with mild small vessel disease. There is a 8 x 5 mm  old lacunar infarct extending from the genu of the right internal  capsule into the anterior right thalamus. The remainder of the brain  parenchyma is normal in attenuation. The ventricles are normal in size.  I see no focal mass effect. There is no midline shift. No extra-axial  fluid collections are identified. There is no evidence of acute  intracranial hemorrhage. No acute skull fracture is identified. The  calvarium and skull base are normal in appearance. The paranasal sinuses  and the mastoid air cells and middle ear cavities are clear.        Impression:      1. No change when compared to the outside head CT on 07/13/2023, 5 days  ago.  2. There is mild small vessel disease in the periventricular white  matter. There is an 8 x 5 mm old lacunar infarct extending from the genu  of the right internal capsule into the anterior right thalamus. The  remainder of the head CT is within normal limits. Specifically, no acute  skull fracture or intracranial hemorrhage is identified.     Radiation dose reduction techniques were utilized, including automated  exposure control and exposure modulation based on body size.     This report was finalized on 7/18/2023 5:38 PM by Dr. Joon Galvez M.D.       XR Chest 1 View [[id number]] Collected: 07/18/23 1244     Updated: 07/18/23 1249    Narrative:      ONE VIEW PORTABLE CHEST     HISTORY: Confusion. Hypertension.  Recent head trauma.     FINDINGS: The lungs are well-expanded and clear. There is a small hiatus  hernia unchanged from 04/01/2021. The heart remains borderline enlarged.     This report was finalized on 7/18/2023 12:46 PM by Dr. Prateek Garcia M.D.       CT Outside Films [[id number]] Resulted: 07/18/23 1240     Updated: 07/18/23 1241    Narrative:      This procedure was auto-finalized with no dictation required.    CT Outside Films [[id number]] Resulted: 07/18/23 1238     Updated: 07/18/23 1239    Narrative:      This procedure was auto-finalized with no dictation required.              Assessment:  Active Hospital Problems    Diagnosis  POA    **Acute focal neurological deficit [R29.818]  Yes      Resolved Hospital Problems   No resolved problems to display.   Headache  Dizziness  Obesity  H/o fall and closed head injury    Plan:  Will ask neurology to see her  Ct without acute changes  Trend labs  Monitor on telemetry  Pt/ot to see  Dw patient and ed provider    Jami Borden MD  7/18/2023  23:19 EDT

## 2023-07-19 NOTE — CONSULTS
"Neurology Consult Note    Consult Date: 7/19/2023    Referring MD: Jami Borden, *    Reason for Consult I have been asked to see the patient in neurological consultation to render advice and opinion regarding headache with word finding difficulty.    Samina Marin is a 72 y.o. right-handed white female with known diagnosis of essential hypertension, A-fib status post ablation as per the patient not on anticoagulation, migraine with aura who presented to the emergency department complaining of severe right-sided headache associated with word finding difficulty and confusion.  The patient stated on Thursday she had a mechanical fall while getting out of the shower tripped and fell on the right side of her face resulted in a wound that needed 3 stitches and bruising around her right eye, yesterday she did not feel well and developed severe right-sided headache, pressure-like, 8/10 in intensity, not associated with nausea and vomiting, meanwhile she started seeing black spots on her left eye, her son noticed her to be confused with word finding difficulty and for that reason they presented to the emergency department.  On interview today her headache improved and she was able to name objects and repeat without issues with her speech.  I personally reviewed her MRI brain that showed no acute abnormality but she had an old infarct on the right genu of internal capsule and right thalamus.  She denied focal weakness, facial droop, vertigo, wobbly gait.  She denied smoking, drinking or drug illicit    Past Medical History:   Diagnosis Date    Allergic rhinitis     Chronic bronchitis     Hypertension     Perianal abscess     Sinus bradycardia        Exam  /74   Pulse 70   Temp 98.1 °F (36.7 °C) (Oral)   Resp 18   Ht 165.1 cm (65\")   Wt 99.3 kg (219 lb)   SpO2 96%   BMI 36.44 kg/m²   Gen: NAD, vitals reviewed  MS: oriented x3, recent/remote memory intact, normal attention/concentration, language intact, " no neglect.  CN: visual acuity grossly normal, PERRL, EOMI, no facial droop, no dysarthria  Motor: 5/5 throughout upper and lower extremities, normal tone  Sensory exam: Normal to light touch throughout  Coordination: Normal finger-nose-finger bilaterally  Reflexes: Plantars downgoing bilaterally  Gait: Not assessed    NIH Stroke Scale :    (0_) 1a. Level of consciousness (LOC): 0=alert;1=arousable by minor stimulation;2=obtunded or needs strong stimulation to attend;3=unresponsive or reflex responses only  (0_) 1b. LOC Questions: 0=answers both;1=answers one;2=answers neither  (0_) 1c. LOC Commands: 0=performs both tasks;1=performs one task;2=performs neither task  (0_) 2. Best Gaze: 0=normal;1=partial gaze palsy;2=forced deviation or total gaze paresis  (0_) 3. Visual: 0=normal;1=partial hemianopia;2=complete hemianopia;3=blind  (0_) 4. Facial palsy: 0=normal;1=minor paresis;2=partial paralysis;3=complete paralysis  FOR 5 AND 6 BELOW: 0=normal;1=drifts but maintains in air;2=unable to maintain in air;3=moves but unable to lift against gravity;4=no movement  (0_)0 (_)1 (_)2 (_)3 (_)4 (_)NA 5a. Motor arm-left  (0_)0 (_)1 (_)2 (_)3 (_)4 (_)NA 5b. Motor arm-right  (0_)0 (_)1 (_)2 (_)3 (_)4 (_)NA 6a. Motor leg-left  (0_)0 (_)1 (_)2 (_)3 (_)4 (_)NA 6ba. Motor leg-right  (0_) 7. Limb ataxia:0=absent;1=unilateral;2=bilateral; NA=unable to test  (0_) 8. Sensory: 0=normal;1=mild-moderate loss;2-severe or total loss  (0_) 9. Best language: 0=normal;1=mild-moderate aphasia, some deficits apparent but able to communicate;2=severe aphasia, fragmentary expression only, unable to communicate well;3=global aphasia, mute and no comprehension  (0_)10. Dysarthria: 0=normal;1=mild-moderate, slurs some words;2=severe, speech mostly unintelligible; NA=unable to test (e.g.,intubation)  (0_)11. Extinction/Inattention: 0=normal;1=visual,tactile,auditory or other extinction to bilateral simultaneous stimulation, but no severe  neglect;2=answers neither      NIH Score: Complete  0      DATA:    Lab Results   Component Value Date    GLUCOSE 102 (H) 07/19/2023    CALCIUM 8.8 07/19/2023     07/19/2023    K 3.8 07/19/2023    CO2 27.4 07/19/2023     07/19/2023    BUN 9 07/19/2023    CREATININE 0.74 07/19/2023    EGFRIFNONA 95 12/07/2021    BCR 12.2 07/19/2023    ANIONGAP 8.6 07/19/2023     Lab Results   Component Value Date    WBC 5.32 07/19/2023    HGB 12.5 07/19/2023    HCT 38.0 07/19/2023    MCV 84.8 07/19/2023     07/19/2023       Lab review: CBC and BMP unremarkable, , A1c 5.4.    Imaging review: I personally reviewed MRI brain that showed an old stroke on the right corona radiata and anterior thalamus, no acute finding.    Diagnoses:  -Transient word finding difficulty which could be related to migraine with aura versus transient ischemic attack  -?  Atrial fibrillation not on anticoagulation status post ablation  -Essential hypertension  -Mixed hyperlipidemia  -Migraine with aura    Pre-stroke MRS: 0  NIHSS: 0        PLAN:   1.  Continue aspirin 81 mg daily  2.  Start Lipitor 40 mg daily  3.  2D echo  4.  CT angio head and neck  5.  2 weeks Zio patch on discharge  6.  Follow-up with stroke as an outpatient, will text MA to arrange for an appointment.

## 2023-07-19 NOTE — PLAN OF CARE
"Goal Outcome Evaluation:  Plan of Care Reviewed With: patient, son           Outcome Evaluation: Pt seen for OT eval after admission with HA, dizziness, vision loss in L eye. Pt had mechanical fall, hitting head on bathtub, while working in [place]. Pt reports nausea, HA and increased dizziness since. Of note, pt reports at her baseline she has some R side weakness and dizziness (\"I stagger a little somtimes\"). Pt appears to be at/near her functional baseline for ADLs and is completing all independently at this time. Recommend d/c to home with initial prn supervision/assist and son able to provide. Also educated on fall prevention and life alert options for home safety. OT will sing off now, please reconsult should new needs arise.      Anticipated Discharge Disposition (OT): home with assist  "

## 2023-07-19 NOTE — THERAPY DISCHARGE NOTE
Acute Care - Occupational Therapy Discharge  [facility]    Patient Name: Samina Marin  : 1950    MRN: [id number]                              Today's Date: 2023       Admit Date: 2023    Visit Dx:     ICD-10-CM ICD-9-CM   1. Follow-up exam  Z09 V67.9   2. Confusion  R41.0 298.9   3. Visual changes  H53.9 368.9   4. Word finding difficulty  R47.89 V40.1   5. Visit for suture removal  Z48.02 V58.32     Patient Active Problem List   Diagnosis    Allergic rhinitis    Aortic valve regurgitation    Arthritis    Asthma    Carpal tunnel syndrome, bilateral    DDD (degenerative disc disease), lumbar    Esophageal reflux    Essential hypertension    Fibromyalgia    Major depressive disorder    Mixed stress and urge urinary incontinence    Mood disorder    Obesity    Paroxysmal supraventricular tachycardia    Postmenopausal    Shortness of breath    Skin cancer    Vitamin D deficiency    Angina pectoris    Lumbar radiculopathy    Trochanteric bursitis of right hip    Muscle spasm    Thoracic spine pain    Lumbar facet arthropathy    Acute focal neurological deficit    Closed head injury    Stroke-like symptoms     Past Medical History:   Diagnosis Date    Allergic rhinitis     Chronic bronchitis     Hypertension     Perianal abscess     Sinus bradycardia      Past Surgical History:   Procedure Laterality Date    BACK SURGERY      CARDIAC CATHETERIZATION N/A 2021    Procedure: Left Heart Cath;  Surgeon: Marily Maya MD;  Location: [facility] CATH INVASIVE LOCATION;  Service: Cardiology;  Laterality: N/A;    CARDIAC CATHETERIZATION N/A 2021    Procedure: Coronary angiography;  Surgeon: Marily Maya MD;  Location:  [facility] CATH INVASIVE LOCATION;  Service: Cardiology;  Laterality: N/A;    CARDIAC CATHETERIZATION N/A 2021    Procedure: Left ventriculography;  Surgeon: Marily Maya MD;  Location:  [facility] CATH INVASIVE LOCATION;  Service: Cardiology;  Laterality: N/A;    CARDIAC  ELECTROPHYSIOLOGY STUDY AND ABLATION      EPIDURAL Right 11/7/2022    Procedure: LUMBAR/SACRAL TRANSFORAMINAL EPIDURAL RIGHT L4, L5;  Surgeon: Jonelle Abraham MD;  Location: [facility] MAIN [facility];  Service: Pain Management;  Laterality: Right;    HYSTERECTOMY      MEDIAL BRANCH BLOCK Bilateral 1/4/2023    Procedure: LUMBAR MEDIAL BRANCH BLOCK BILATERAL L3-L5 #1;  Surgeon: Jonelle Abraham MD;  Location: [facility];  Service: Pain Management;  Laterality: Bilateral;    ROTATOR CUFF REPAIR Right     TENDON REPAIR Right     SHOULDER      General Information       Row Name 07/19/23 1057          OT Time and Intention    Document Type discharge evaluation/summary  -CE     Mode of Treatment occupational therapy  -CE       Row Name 07/19/23 1057          General Information    Patient Profile Reviewed yes  -CE     Prior Level of Function independent:;home management;cooking;cleaning;driving;ADL's  -CE     Existing Precautions/Restrictions fall  -CE       Row Name 07/19/23 1057          Living Environment    People in Home alone  son reports he may stay with pt at d/c; pt has declined moving in with her son in the past  -CE       Row Name 07/19/23 1057          Home Main Entrance    Number of Stairs, Main Entrance none  -CE       Row Name 07/19/23 1057          Cognition    Orientation Status (Cognition) oriented x 4  -CE               User Key  (r) = Recorded By, (t) = Taken By, (c) = Cosigned By      Initials Name Provider Type    Mary Carranza OT Occupational Therapist                   Mobility/ADL's       Row Name 07/19/23 1058          Transfers    Transfers sit-stand transfer;toilet transfer  -CE       Row Name 07/19/23 1058          Sit-Stand Transfer    Sit-Stand Gage (Transfers) independent  -CE       Row Name 07/19/23 1058          Toilet Transfer    Type (Toilet Transfer) sit-stand;stand-sit  -CE     Gage Level (Toilet Transfer) independent  -CE     Comment, (Toilet Transfer) with AD  -CE        Row Name 07/19/23 1058          Functional Mobility    Functional Mobility- Ind. Level independent  -CE     Functional Mobility- Comment Pt ambulating to/from bathroom in hallway without AD and without LOB  -CE       Row Name 07/19/23 1058          Activities of Daily Living    BADL Assessment/Intervention toileting  -CE       Row Name 07/19/23 1058          Toileting Assessment/Training    Goodwin Level (Toileting) toileting skills;independent  -CE               User Key  (r) = Recorded By, (t) = Taken By, (c) = Cosigned By      Initials Name Provider Type    CE Mary Sharpe OT Occupational Therapist                   Obj/Interventions       Row Name 07/19/23 1059          Sensory Assessment (Somatosensory)    Sensory Assessment (Somatosensory) sensation intact  -CE       Row Name 07/19/23 1059          Vision Assessment/Intervention    Visual Impairment/Limitations WFL;blurry vision  -CE     Vision Assessment Comment pt reports slightly increased blurry vision since fall but reports it has improved since admission  -CE       Row Name 07/19/23 1059          Range of Motion Comprehensive    General Range of Motion bilateral upper extremity ROM WNL;bilateral lower extremity ROM WFL  -CE       Row Name 07/19/23 1059          Strength Comprehensive (MMT)    General Manual Muscle Testing (MMT) Assessment no strength deficits identified  -CE       Row Name 07/19/23 1059          Balance    Balance Assessment standing dynamic balance  -CE     Static Standing Balance independent  -CE               User Key  (r) = Recorded By, (t) = Taken By, (c) = Cosigned By      Initials Name Provider Type    aMry Carranza OT Occupational Therapist                   Goals/Plan    No documentation.                  Clinical Impression       Row Name 07/19/23 1100          Pain Assessment    Pretreatment Pain Rating 0/10 - no pain  -CE     Posttreatment Pain Rating 0/10 - no pain  -CE       Row Name 07/19/23 1100     "      Plan of Care Review    Plan of Care Reviewed With patient;son  -CE     Outcome Evaluation Pt seen for OT eval after admission with HA, dizziness, vision loss in L eye. Pt had mechanical fall, hitting head on bathtub, while working in [place]. Pt reports nausea, HA and increased dizziness since. Of note, pt reports at her baseline she has some R side weakness and dizziness (\"I stagger a little somtimes\"). Pt appears to be at/near her functional baseline for ADLs and is completing all independently at this time. Recommend d/c to home with initial prn supervision/assist and son able to provide. Also educated on fall prevention and life alert options for home safety. OT will sing off now, please reconsult should new needs arise.  -CE       Row Name 07/19/23 1100          Therapy Assessment/Plan (OT)    Criteria for Skilled Therapeutic Interventions Met (OT) no problems identified which require skilled intervention  -CE       Row Name 07/19/23 1100          Therapy Plan Review/Discharge Plan (OT)    Anticipated Discharge Disposition (OT) home with assist  -CE       Row Name 07/19/23 1100          Vital Signs    O2 Delivery Pre Treatment room air  -CE     Pre Patient Position Sitting  -CE     Intra Patient Position Standing  -CE     Post Patient Position Sitting  -CE       Row Name 07/19/23 1100          Positioning and Restraints    Pre-Treatment Position bathroom  -CE     Post Treatment Position --  with transport for MRI  -CE               User Key  (r) = Recorded By, (t) = Taken By, (c) = Cosigned By      Initials Name Provider Type    CE Mary Sharpe, OT Occupational Therapist                   Outcome Measures       Row Name 07/19/23 1103          How much help from another is currently needed...    Putting on and taking off regular lower body clothing? 4  -CE     Bathing (including washing, rinsing, and drying) 4  -CE     Toileting (which includes using toilet bed pan or urinal) 4  -CE     Putting on " and taking off regular upper body clothing 4  -CE     Taking care of personal grooming (such as brushing teeth) 4  -CE     Eating meals 4  -CE     AM-PAC 6 Clicks Score (OT) 24  -CE       Row Name 07/19/23 0959          How much help from another person do you currently need...    Turning from your back to your side while in flat bed without using bedrails? 4  -CW     Moving from lying on back to sitting on the side of a flat bed without bedrails? 4  -CW     Moving to and from a bed to a chair (including a wheelchair)? 4  -CW     Standing up from a chair using your arms (e.g., wheelchair, bedside chair)? 4  -CW     Climbing 3-5 steps with a railing? 4  -CW     To walk in hospital room? 4  -CW     AM-PAC 6 Clicks Score (PT) 24  -CW     Highest level of mobility 8 --> Walked 250 feet or more  -CW       Row Name 07/19/23 1103 07/19/23 0959       Functional Assessment    Outcome Measure Options AM-PAC 6 Clicks Daily Activity (OT)  -CE AM-PAC 6 Clicks Basic Mobility (PT)  -CW              User Key  (r) = Recorded By, (t) = Taken By, (c) = Cosigned By      Initials Name Provider Type    CW Frances Fisher, PT Physical Therapist    Mary Carranza, OT Occupational Therapist                  Occupational Therapy Education       Title: PT OT SLP Therapies (In Progress)       Topic: Occupational Therapy (In Progress)       Point: ADL training (Done)       Description:   Instruct learner(s) on proper safety adaptation and remediation techniques during self care or transfers.   Instruct in proper use of assistive devices.                  Learning Progress Summary             Patient Acceptance, E, VU by CE at 7/19/2023 1104   Family Acceptance, E, VU by CE at 7/19/2023 1104                         Point: Precautions (Not Started)       Description:   Instruct learner(s) on prescribed precautions during self-care and functional transfers.                  Learner Progress:  Not documented in this visit.               "Point: Body mechanics (Done)       Description:   Instruct learner(s) on proper positioning and spine alignment during self-care, functional mobility activities and/or exercises.                  Learning Progress Summary             Patient Acceptance, E, VU by CE at 7/19/2023 1104   Family Acceptance, E, VU by CE at 7/19/2023 1104                                         User Key       Initials Effective Dates Name Provider Type Discipline    CE 10/17/22 -  Mary Sharpe OT Occupational Therapist OT                  OT Recommendation and Plan     Plan of Care Review  Plan of Care Reviewed With: patient, son  Outcome Evaluation: Pt seen for OT eval after admission with HA, dizziness, vision loss in L eye. Pt had mechanical fall, hitting head on bathtub, while working in [place]. Pt reports nausea, HA and increased dizziness since. Of note, pt reports at her baseline she has some R side weakness and dizziness (\"I stagger a little somtimes\"). Pt appears to be at/near her functional baseline for ADLs and is completing all independently at this time. Recommend d/c to home with initial prn supervision/assist and son able to provide. Also educated on fall prevention and life alert options for home safety. OT will sing off now, please reconsult should new needs arise.  Plan of Care Reviewed With: patient, son  Outcome Evaluation: Pt seen for OT eval after admission with HA, dizziness, vision loss in L eye. Pt had mechanical fall, hitting head on bathtub, while working in [place]. Pt reports nausea, HA and increased dizziness since. Of note, pt reports at her baseline she has some R side weakness and dizziness (\"I stagger a little somtimes\"). Pt appears to be at/near her functional baseline for ADLs and is completing all independently at this time. Recommend d/c to home with initial prn supervision/assist and son able to provide. Also educated on fall prevention and life alert options for home safety. OT will " sing off now, please reconsult should new needs arise.     Time Calculation:   Evaluation Complexity (OT)  Review Occupational Profile/Medical/Therapy History Complexity: brief/low complexity  Assessment, Occupational Performance/Identification of Deficit Complexity: 1-3 performance deficits  Clinical Decision Making Complexity (OT): problem focused assessment/low complexity  Overall Complexity of Evaluation (OT): low complexity     Time Calculation- OT       Row Name 07/19/23 1105             Time Calculation- OT    OT Start Time 0935  -CE      OT Stop Time 0945  -CE      OT Time Calculation (min) 10 min  -CE      OT Received On 07/19/23  -                User Key  (r) = Recorded By, (t) = Taken By, (c) = Cosigned By      Initials Name Provider Type    CE Mary Sharpe OT Occupational Therapist                  Therapy Charges for Today       Code Description Service Date Service Provider Modifiers Qty    [id number]  OT EVAL LOW COMPLEXITY 2 7/19/2023 Mary Sharpe OT GO 1               OT Discharge Summary  Anticipated Discharge Disposition (OT): home with assist    Mary Sharpe OT  7/19/2023

## 2023-07-19 NOTE — PROGRESS NOTES
Received request for stroke screening per [facility] Stroke Order set.  Rehab Adm Nurses will review periodically to see if full acute Rehab evaluation is appropriate.  If acute care staff feel patient is appropriate for full acute Rehab evaluation now--please call referral to [phone].   Thank you--Bety Bess,  Rehab Adm. Coordinator

## 2023-07-19 NOTE — PLAN OF CARE
Problem: Fall Injury Risk  Goal: Absence of Fall and Fall-Related Injury  7/19/2023 0347 by Eloina Kennedy RN  Outcome: Ongoing, Progressing  7/19/2023 0345 by Eloina Kennedy RN  Outcome: Ongoing, Progressing  Intervention: Promote Injury-Free Environment  Recent Flowsheet Documentation  Taken 7/19/2023 0203 by Eloina Kennedy, RN  Safety Promotion/Fall Prevention:   room organization consistent   safety round/check completed   nonskid shoes/slippers when out of bed  Taken 7/19/2023 0001 by Eloina Kennedy RN  Safety Promotion/Fall Prevention:   safety round/check completed   room organization consistent   nonskid shoes/slippers when out of bed  Taken 7/18/2023 2204 by Eloina Kennedy RN  Safety Promotion/Fall Prevention:   room organization consistent   safety round/check completed   nonskid shoes/slippers when out of bed  Taken 7/18/2023 2013 by Eloina Kennedy, RN  Safety Promotion/Fall Prevention:   nonskid shoes/slippers when out of bed   room organization consistent   safety round/check completed   Goal Outcome Evaluation:

## 2023-07-20 ENCOUNTER — APPOINTMENT (OUTPATIENT)
Dept: CARDIOLOGY | Facility: HOSPITAL | Age: 73
End: 2023-07-20
Payer: MEDICARE

## 2023-07-20 VITALS
HEIGHT: 65 IN | BODY MASS INDEX: 36.49 KG/M2 | TEMPERATURE: 97.7 F | DIASTOLIC BLOOD PRESSURE: 81 MMHG | WEIGHT: 219 LBS | OXYGEN SATURATION: 93 % | HEART RATE: 70 BPM | SYSTOLIC BLOOD PRESSURE: 133 MMHG | RESPIRATION RATE: 16 BRPM

## 2023-07-20 PROBLEM — R29.90 STROKE-LIKE SYMPTOMS: Status: RESOLVED | Noted: 2023-07-19 | Resolved: 2023-07-20

## 2023-07-20 PROCEDURE — 99214 OFFICE O/P EST MOD 30 MIN: CPT | Performed by: NURSE PRACTITIONER

## 2023-07-20 PROCEDURE — G0378 HOSPITAL OBSERVATION PER HR: HCPCS

## 2023-07-20 PROCEDURE — 93246 EXT ECG>7D<15D RECORDING: CPT

## 2023-07-20 RX ORDER — ATORVASTATIN CALCIUM 40 MG/1
40 TABLET, FILM COATED ORAL NIGHTLY
Qty: 90 TABLET | Refills: 0 | Status: SHIPPED | OUTPATIENT
Start: 2023-07-20

## 2023-07-20 RX ADMIN — OXYBUTYNIN CHLORIDE 10 MG: 10 TABLET, EXTENDED RELEASE ORAL at 08:30

## 2023-07-20 RX ADMIN — PANTOPRAZOLE SODIUM 40 MG: 40 TABLET, DELAYED RELEASE ORAL at 08:30

## 2023-07-20 RX ADMIN — DULOXETINE HYDROCHLORIDE 60 MG: 60 CAPSULE, DELAYED RELEASE ORAL at 08:30

## 2023-07-20 RX ADMIN — METOPROLOL TARTRATE 25 MG: 25 TABLET, FILM COATED ORAL at 08:30

## 2023-07-20 RX ADMIN — ASPIRIN 81 MG: 81 TABLET, CHEWABLE ORAL at 08:30

## 2023-07-20 NOTE — PROGRESS NOTES
"DOS: 2023  NAME: Samina Marin   : 1950  PCP: Jonelle Joseph APRN  Chief Complaint   Patient presents with    Altered Mental Status    Headache     Stroke    Subjective: Family member at bedside. Patient states she is feeling better, no current speech or vision abnormality, no headache. Going to be d/c. Pt seen in follow up today, however the problem is new to the examiner.      Objective:  Vital signs: /81 (BP Location: Left arm, Patient Position: Lying)   Pulse 70   Temp 97.7 °F (36.5 °C)   Resp 16   Ht 165.1 cm (65\")   Wt 99.3 kg (219 lb)   SpO2 93%   BMI 36.44 kg/m²       General appearance: Well developed, well nourished, well groomed, alert and cooperative.   HEENT: Normocephalic.   Neck: Supple.   Cardiac: Regular rate and rhythm. No murmurs.   Peripheral Vasculature: Radial pulses are equal and symmetric.  Chest Exam: Clear to auscultation bilaterally, no wheezes, no rhonchi.  Extremities: Normal, no edema.   Skin: No rashes or birthmarks.     Higher integrative function: Oriented to time, place, person, intact recent and remote memory, attention span, concentration and language. Spontaneous speech, fund of vocabulary are normal.   CN II: Normal visual fields.   CN III IV VI: Extraocular movements are full without nystagmus. Pupils are equal, round, and reactive to light.   CN V: Normal facial sensation.  CN VII: Facial movements are symmetric, no weakness.   CN VIII: Auditory acuity is normal.   CN IX & X: Symmetric palatal movement.   CN XI: Sternocleidomastoid and trapezius are normal. No weakness.   CN XII: The tongue is midline. No atrophy or fasciculations.   Motor: Normal muscle strength, bulk, and tone in upper and lower extremities. No fasciculations, rigidity, spasticity or abnormal movements.   Sensation: Normal light touch.  Station and gait: Deferred.  Coordination: Finger to nose test showed no dysmetria.    No current facility-administered medications on file prior " to encounter.     Current Outpatient Medications on File Prior to Encounter   Medication Sig    aspirin 81 MG EC tablet Take 1 tablet by mouth Daily.    cetirizine (zyrTEC) 10 MG tablet Take 1 tablet by mouth Daily.    diclofenac (VOLTAREN) 50 MG EC tablet Take 1 tablet by mouth 2 (Two) Times a Day.    DULoxetine (CYMBALTA) 60 MG capsule Take 1 capsule by mouth Daily.    esomeprazole (nexIUM) 40 MG capsule Take 1 capsule by mouth Daily.    metoprolol tartrate (LOPRESSOR) 25 MG tablet Take 1 tablet by mouth 2 (Two) Times a Day.    tolterodine (DETROL) 2 MG tablet Take 1 tablet by mouth 2 (Two) Times a Day.         PRN Meds:.      Laboratory results:  Lab Results   Component Value Date    GLUCOSE 102 (H) 07/19/2023    CALCIUM 8.8 07/19/2023     07/19/2023    K 3.8 07/19/2023    CO2 27.4 07/19/2023     07/19/2023    BUN 9 07/19/2023    CREATININE 0.74 07/19/2023    EGFRIFNONA 95 12/07/2021    BCR 12.2 07/19/2023    ANIONGAP 8.6 07/19/2023     Lab Results   Component Value Date    WBC 5.32 07/19/2023    HGB 12.5 07/19/2023    HCT 38.0 07/19/2023    MCV 84.8 07/19/2023     07/19/2023     Lab Results   Component Value Date    CHOL 168 07/19/2023    CHOL 193 04/10/2023    CHOL 213 (H) 08/16/2022     Lab Results   Component Value Date    HDL 60 07/19/2023    HDL 65 (H) 04/10/2023    HDL 74 (H) 08/16/2022     Lab Results   Component Value Date    LDL 98 07/19/2023     (H) 04/10/2023     (H) 08/16/2022     Lab Results   Component Value Date    TRIG 48 07/19/2023    TRIG 92 04/10/2023    TRIG 58 08/16/2022         Lab 07/19/23  1203   HEMOGLOBIN A1C 5.40      Review and interpretation of imaging:  Adult transthoracic echo complete    Result Date: 7/19/2023    Left ventricular systolic function is normal. Left ventricular ejection fraction appears to be 61 - 65%.   Left ventricular diastolic function was indeterminate.   Saline test results are negative.   Estimated right ventricular systolic  pressure from tricuspid regurgitation is mildly elevated (35-45 mmHg).     CT Head Without Contrast    Result Date: 7/18/2023  EMERGENCY CT SCAN OF THE HEAD WITHOUT CONTRAST ON 07/18/2023  CLINICAL HISTORY: Patient has a reported history of head trauma on 07/13/2023 where she had a mechanical fall hitting her head on the tub. Patient has pain behind right eye, some lightheadedness, difficulty finding words.  TECHNIQUE: Spiral CT images were obtained from the base of the skull to the vertex without intravenous contrast. The images were reformatted and are submitted in 3 mm thick axial, sagittal and coronal CT sections with brain algorithm and 2 mm thick axial CT sections with high-resolution bone algorithm.  This is correlated to prior outside head CT 07/13/2023.  FINDINGS: There is mild patchy low-density in the periventricular white matter consistent with mild small vessel disease. There is a 8 x 5 mm old lacunar infarct extending from the genu of the right internal capsule into the anterior right thalamus. The remainder of the brain parenchyma is normal in attenuation. The ventricles are normal in size. I see no focal mass effect. There is no midline shift. No extra-axial fluid collections are identified. There is no evidence of acute intracranial hemorrhage. No acute skull fracture is identified. The calvarium and skull base are normal in appearance. The paranasal sinuses and the mastoid air cells and middle ear cavities are clear.      1. No change when compared to the outside head CT on 07/13/2023, 5 days ago. 2. There is mild small vessel disease in the periventricular white matter. There is an 8 x 5 mm old lacunar infarct extending from the genu of the right internal capsule into the anterior right thalamus. The remainder of the head CT is within normal limits. Specifically, no acute skull fracture or intracranial hemorrhage is identified.  Radiation dose reduction techniques were utilized, including  automated exposure control and exposure modulation based on body size.  This report was finalized on 7/18/2023 5:38 PM by Dr. Joon Galvez M.D.      CT Angiogram Neck    Result Date: 7/19/2023  CT ANGIOGRAM NECK AND HEAD WITH CONTRAST  HISTORY: Headache, lightheaded.  COMPARISON: MRI brain 07/19/2023.  FINDINGS:  Initially, a noncontrasted CT examination of the brain was performed. The brain and ventricles are symmetrical. There is no evidence of hemorrhage, hydrocephalus or of abnormal extra-axial fluid. No focal area of decreased attenuation to suggest acute infarction is identified. A lacunar infarct involving the genu of the internal capsule on the right is again appreciated.  A CT angiogram of the neck and head was performed. Multiplanar as well as 3-dimensional reconstructions were generated.  FINDINGS: The great vessels are arranged in a classic configuration. There is 0% stenosis of the internal carotid arteries using NASCET criteria. The distal aspects of the internal carotid arteries and the proximal aspects of the anterior and middle cerebral arteries appear unremarkable.  Both vertebral arteries were opacified. Beam hardening artifact limits evaluation of the vertebral arteries bilaterally. No convincing high-grade stenosis is appreciated. There is minimal irregularity involving the distal cervical vertebral segments bilaterally suggesting minimal fibromuscular dysplasia. The basilar artery and the proximal aspects of the posterior cerebral arteries appear unremarkable.  Sagittal reconstructions demonstrate mild loss of disc height at C5-6 and C6-7.      There is minimal fibromuscular dysplasia involving the vertebral arteries bilaterally. There is no evidence of a proximal intracranial high-grade arterial stenosis or occlusion. There is no evidence of aneurysm.    Radiation dose reduction techniques were utilized, including automated exposure control and exposure modulation based on body size.  This  report was finalized on 7/19/2023 8:47 PM by Dr. Lupillo Monge M.D.      MRI Brain Without Contrast    Result Date: 7/19/2023  MRI OF THE BRAIN WITHOUT CONTRAST ON 07/19/2023  CLINICAL HISTORY: This is a 72-year-old female patient who reports a fall in her bathtub during which she hit her head. The patient has some confusion and dizziness and intermittent loss of vision in left eye.  TECHNIQUE: Axial T1, FLAIR, fat-suppressed T2, axial diffusion and gradient echo T2 and sagittal T1-weighted images were obtained of the entire head.  COMPARISON: This is correlated to yesterday's CT of the head without contrast on 07/18/2023 at 1:20 PM. There are no prior MRIs of the brain for comparison.  FINDINGS: There is some mild patchy nodular T2 high signal in the periventricular white matter of cerebral hemispheres and scattered tiny patchy nodular foci in the subcortical white matter and the findings are consistent with mild-to-moderate small vessel disease. There is an 8 x 5 mm old lacunar type infarct extending from the genu of the right internal capsule and into the anterior right thalamus. The remainder of the brain parenchyma is normal in signal intensity. Specifically no diffusion weighted abnormality is identified with no acute infarct identified. On the gradient echo T2 weighted images I see no acute or old blood breakdown products intracranially. The ventricles are normal in size. I see no focal mass effect. There is no midline shift. No extra-axial fluid collections are identified. The paranasal sinuses are clear. There is a very tiny amount of fluid in the inferior tip of the left mastoid air cells. The right mastoid and middle ear cavity are clear. Good flow voids are demonstrated in the cerebral vessels and in the dural venous sinuses. The calvarium and the skull base demonstrate normal marrow signal intensity.      1. No acute intracranial abnormality is identified. 2. There is mild-to-moderate small vessel  disease in cerebral white matter. There is an 8 x 5 mm old lacunar infarct extending from the genu of the right internal capsule into the anterior right thalamus. The remainder of the MRI of the brain is normal. Specifically no acute infarct is seen and no acute posttraumatic intracranial abnormality is identified. The etiology of patient's confusion, dizziness and intermittent loss of vision left eye is not established on this exam.  This report was finalized on 7/19/2023 1:11 PM by Dr. Joon Galvez M.D.      XR Chest 1 View    Result Date: 7/18/2023  ONE VIEW PORTABLE CHEST  HISTORY: Confusion. Hypertension. Recent head trauma.  FINDINGS: The lungs are well-expanded and clear. There is a small hiatus hernia unchanged from 04/01/2021. The heart remains borderline enlarged.  This report was finalized on 7/18/2023 12:46 PM by Dr. Prateek Garcia M.D.      CT Angiogram Head    Result Date: 7/19/2023  CT ANGIOGRAM NECK AND HEAD WITH CONTRAST  HISTORY: Headache, lightheaded.  COMPARISON: MRI brain 07/19/2023.  FINDINGS:  Initially, a noncontrasted CT examination of the brain was performed. The brain and ventricles are symmetrical. There is no evidence of hemorrhage, hydrocephalus or of abnormal extra-axial fluid. No focal area of decreased attenuation to suggest acute infarction is identified. A lacunar infarct involving the genu of the internal capsule on the right is again appreciated.  A CT angiogram of the neck and head was performed. Multiplanar as well as 3-dimensional reconstructions were generated.  FINDINGS: The great vessels are arranged in a classic configuration. There is 0% stenosis of the internal carotid arteries using NASCET criteria. The distal aspects of the internal carotid arteries and the proximal aspects of the anterior and middle cerebral arteries appear unremarkable.  Both vertebral arteries were opacified. Beam hardening artifact limits evaluation of the vertebral arteries bilaterally. No  convincing high-grade stenosis is appreciated. There is minimal irregularity involving the distal cervical vertebral segments bilaterally suggesting minimal fibromuscular dysplasia. The basilar artery and the proximal aspects of the posterior cerebral arteries appear unremarkable.  Sagittal reconstructions demonstrate mild loss of disc height at C5-6 and C6-7.      There is minimal fibromuscular dysplasia involving the vertebral arteries bilaterally. There is no evidence of a proximal intracranial high-grade arterial stenosis or occlusion. There is no evidence of aneurysm.    Radiation dose reduction techniques were utilized, including automated exposure control and exposure modulation based on body size.  This report was finalized on 7/19/2023 8:47 PM by Dr. Lupillo Monge M.D.       Impression:  72-year-old right-handed female with hypertension, A-fib status post ablation (not on anticoagulation), history of migraine with aura who presented with headache and word finding difficulty.  The patient stated on Thursday she had a mechanical fall while getting out of the shower, tripping and falling on the right side of her face which resulted in a laceration that required 3 stitches.  On 7/18 she did not feel well and developed a severe right-sided headache with associated nausea, vomiting, seeing black spots in her left eye and her son noticed her to be confused with word finding difficulty so they brought her to the ED for further evaluation. She states she was taking asa 325 mg daily PTA. She was not on a statin PTA.     Work-up:  MRI brain without: No acute findings.  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  CTA head/neck: Minimal fibromuscular dysplasia involving the bilateral vertebral arteries but no significant stenosis noted.  No aneurysm.  2D echo: EF 61 to 65%, normal left atrial size and volume.  Saline test results negative.  No significant valvular issues.    Dx: Word  finding difficulty with visual issues and associated headache, suspect secondary to complicated migraine, will treat as possible TIA  History of A-fib, status post ablation    Plan:  Aspirin decreased to 81 mg daily  Lipitor 40 mg daily started this admission  Zio Patch to be placed on discharge  Will arrange stroke f/u in 2-3 months.   D/W Dr Navarrete today. OK for d/c. Neurology still sign off but please call if further questions/concerns.

## 2023-07-20 NOTE — DISCHARGE SUMMARY
Patient Name: Samina Marin  : 1950  MRN: [id number]    Date of Admission: 2023  Date of Discharge:  2023  Primary Care Physician: Jonelle Joseph APRN      Chief Complaint:   Altered Mental Status and Headache      Discharge Diagnoses     Active Hospital Problems    Diagnosis  POA    **Migraine with aura and without status migrainosus [G43.109]  Yes    Closed head injury [S09.90XA]  Yes    Acute focal neurological deficit [R29.818]  Yes    Essential hypertension [I10]  Yes    Aortic valve regurgitation [I35.1]  Yes    Fibromyalgia [M79.7]  Yes      Resolved Hospital Problems    Diagnosis Date Resolved POA    Stroke-like symptoms [R29.90] 2023 Yes        Hospital Course     Ms. Marin is a 72 y.o. female with a history of HTN, atrial fibrillation status post ablation, migraine, fibromyalgia who presented with after a closed head injury when she slipped hitting her head in the bathtub.  This occurred several days prior to admission she was out of town.  She had been worked up at [facility] with imaging that was negative.  She presented with changes in speech, headache, vision change in her left eye.  Patient was on aspirin prior to admission.  Neurology was consulted for evaluation.  MRI brain is without acute findings.  MRI does show an old right internal capsule stroke age undetermined but not the etiology of her presenting symptoms.  CTA head and neck with minimal fibromuscular dysplasia involving bilateral vertebral arteries but no significant stenosis and no aneurysm.  Echocardiogram with a EF 61 to 65%, normal left atrial size and volume, negative saline test and no valvular issues.  Since admission she has not had any difficulty with word finding or vision change.  Neurology suspects that her symptoms are secondary to a complicated migraine following her head injury.  She will be discharged on preventative treatment with aspirin 81 mg, Lipitor 40 mg and a Zio patch at  discharge.  She will follow-up with neurology in 2 to 3 months.  Okay for discharge. 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         Day of Discharge     Subjective:  Feeling better no new complaints. No neuro complaints.     Physical Exam:     Body mass index is 36.44 kg/m².  Physical Exam  Vitals and nursing note reviewed.   Constitutional:       Appearance: She is well-developed.   HENT:      Head: Normocephalic.   Eyes:      Conjunctiva/sclera: Conjunctivae normal.      Comments: Right periorbital ecchymosis    Cardiovascular:      Rate and Rhythm: Normal rate and regular rhythm.      Heart sounds: Normal heart sounds.   Pulmonary:      Effort: Pulmonary effort is normal.      Breath sounds: Normal breath sounds.   Abdominal:      General: Bowel sounds are normal. There is no distension.      Palpations: Abdomen is soft.      Tenderness: There is no abdominal tenderness.   Musculoskeletal:      Cervical back: Normal range of motion.   Skin:     General: Skin is warm and dry.   Neurological:      General: No focal deficit present.      Mental Status: She is alert and oriented to person, place, and time. Mental status is at baseline.      Cranial Nerves: No cranial nerve deficit.   Psychiatric:         Behavior: Behavior normal.       Consultants     Consult Orders (all) (From admission, onward)       Start     Ordered    07/18/23 2326  Inpatient Neurology Consult General  Once,   Status:  Canceled        Specialty:  Neurology  Provider:  Terry Helton MD    07/18/23 2325 07/18/23 1802  Notify Stroke Coordinator  Once,   Status:  Canceled        Provider:  (Not yet assigned)    07/18/23 1803    07/18/23 1802  Inpatient Rehab Admission Consult  Once,   Status:  Canceled        Provider:  (Not yet assigned)    07/18/23 1803    07/18/23 1802  Consult to Case Management   Once        Provider:  (Not  yet assigned)    07/18/23 1803 07/18/23 1802  Consult to Diabetes Educator  Once,   Status:  Canceled        Provider:  (Not yet assigned)    07/18/23 1803 07/18/23 1802  Inpatient Neurology Consult Stroke  Once        Specialty:  Neurology  Provider:  Lucila Ribeiro MD    07/18/23 1803 07/18/23 1428  [facility] (on-call MD unless specified) Details  Once,   Status:  Canceled        Specialty:  Hospitalist  Provider:  Jami Borden MD    07/18/23 1427                  Procedures     * Surgery not found *    Imaging Results (All)       Procedure Component Value Units Date/Time    CT Angiogram Neck [[id number]] Collected: 07/19/23 1909     Updated: 07/19/23 2050    Narrative:      CT ANGIOGRAM NECK AND HEAD WITH CONTRAST     HISTORY: Headache, lightheaded.     COMPARISON: MRI brain 07/19/2023.     FINDINGS:     Initially, a noncontrasted CT examination of the brain was performed.  The brain and ventricles are symmetrical. There is no evidence of  hemorrhage, hydrocephalus or of abnormal extra-axial fluid. No focal  area of decreased attenuation to suggest acute infarction is identified.  A lacunar infarct involving the genu of the internal capsule on the  right is again appreciated.     A CT angiogram of the neck and head was performed. Multiplanar as well  as 3-dimensional reconstructions were generated.     FINDINGS: The great vessels are arranged in a classic configuration.  There is 0% stenosis of the internal carotid arteries using NASCET  criteria. The distal aspects of the internal carotid arteries and the  proximal aspects of the anterior and middle cerebral arteries appear  unremarkable.     Both vertebral arteries were opacified. Beam hardening artifact limits  evaluation of the vertebral arteries bilaterally. No convincing  high-grade stenosis is appreciated. There is minimal irregularity  involving the distal cervical vertebral segments bilaterally suggesting  minimal fibromuscular  dysplasia. The basilar artery and the proximal  aspects of the posterior cerebral arteries appear unremarkable.     Sagittal reconstructions demonstrate mild loss of disc height at C5-6  and C6-7.       Impression:      There is minimal fibromuscular dysplasia involving the  vertebral arteries bilaterally. There is no evidence of a proximal  intracranial high-grade arterial stenosis or occlusion. There is no  evidence of aneurysm.           Radiation dose reduction techniques were utilized, including automated  exposure control and exposure modulation based on body size.     This report was finalized on 7/19/2023 8:47 PM by Dr. Lupillo Monge M.D.       CT Angiogram Head [[id number]] Collected: 07/19/23 1909     Updated: 07/19/23 2050    Narrative:      CT ANGIOGRAM NECK AND HEAD WITH CONTRAST     HISTORY: Headache, lightheaded.     COMPARISON: MRI brain 07/19/2023.     FINDINGS:     Initially, a noncontrasted CT examination of the brain was performed.  The brain and ventricles are symmetrical. There is no evidence of  hemorrhage, hydrocephalus or of abnormal extra-axial fluid. No focal  area of decreased attenuation to suggest acute infarction is identified.  A lacunar infarct involving the genu of the internal capsule on the  right is again appreciated.     A CT angiogram of the neck and head was performed. Multiplanar as well  as 3-dimensional reconstructions were generated.     FINDINGS: The great vessels are arranged in a classic configuration.  There is 0% stenosis of the internal carotid arteries using NASCET  criteria. The distal aspects of the internal carotid arteries and the  proximal aspects of the anterior and middle cerebral arteries appear  unremarkable.     Both vertebral arteries were opacified. Beam hardening artifact limits  evaluation of the vertebral arteries bilaterally. No convincing  high-grade stenosis is appreciated. There is minimal irregularity  involving the distal cervical vertebral  segments bilaterally suggesting  minimal fibromuscular dysplasia. The basilar artery and the proximal  aspects of the posterior cerebral arteries appear unremarkable.     Sagittal reconstructions demonstrate mild loss of disc height at C5-6  and C6-7.       Impression:      There is minimal fibromuscular dysplasia involving the  vertebral arteries bilaterally. There is no evidence of a proximal  intracranial high-grade arterial stenosis or occlusion. There is no  evidence of aneurysm.           Radiation dose reduction techniques were utilized, including automated  exposure control and exposure modulation based on body size.     This report was finalized on 7/19/2023 8:47 PM by Dr. Lupillo Monge M.D.       MRI Brain Without Contrast [[id number]] Collected: 07/19/23 1125     Updated: 07/19/23 1314    Narrative:      MRI OF THE BRAIN WITHOUT CONTRAST ON 07/19/2023     CLINICAL HISTORY: This is a 72-year-old female patient who reports a  fall in her bathtub during which she hit her head. The patient has some  confusion and dizziness and intermittent loss of vision in left eye.     TECHNIQUE: Axial T1, FLAIR, fat-suppressed T2, axial diffusion and  gradient echo T2 and sagittal T1-weighted images were obtained of the  entire head.     COMPARISON: This is correlated to yesterday's CT of the head without  contrast on 07/18/2023 at 1:20 PM. There are no prior MRIs of the brain  for comparison.     FINDINGS: There is some mild patchy nodular T2 high signal in the  periventricular white matter of cerebral hemispheres and scattered tiny  patchy nodular foci in the subcortical white matter and the findings are  consistent with mild-to-moderate small vessel disease. There is an 8 x 5  mm old lacunar type infarct extending from the genu of the right  internal capsule and into the anterior right thalamus. The remainder of  the brain parenchyma is normal in signal intensity. Specifically no  diffusion weighted abnormality is  identified with no acute infarct  identified. On the gradient echo T2 weighted images I see no acute or  old blood breakdown products intracranially. The ventricles are normal  in size. I see no focal mass effect. There is no midline shift. No  extra-axial fluid collections are identified. The paranasal sinuses are  clear. There is a very tiny amount of fluid in the inferior tip of the  left mastoid air cells. The right mastoid and middle ear cavity are  clear. Good flow voids are demonstrated in the cerebral vessels and in  the dural venous sinuses. The calvarium and the skull base demonstrate  normal marrow signal intensity.        Impression:      1. No acute intracranial abnormality is identified.  2. There is mild-to-moderate small vessel disease in cerebral white  matter. There is an 8 x 5 mm old lacunar infarct extending from the genu  of the right internal capsule into the anterior right thalamus. The  remainder of the MRI of the brain is normal. Specifically no acute  infarct is seen and no acute posttraumatic intracranial abnormality is  identified. The etiology of patient's confusion, dizziness and  intermittent loss of vision left eye is not established on this exam.     This report was finalized on 7/19/2023 1:11 PM by Dr. Joon Galvez M.D.       CT Head Without Contrast [[id number]] Collected: 07/18/23 1404     Updated: 07/18/23 1741    Narrative:      EMERGENCY CT SCAN OF THE HEAD WITHOUT CONTRAST ON 07/18/2023     CLINICAL HISTORY: Patient has a reported history of head trauma on  07/13/2023 where she had a mechanical fall hitting her head on the tub.  Patient has pain behind right eye, some lightheadedness, difficulty  finding words.      TECHNIQUE: Spiral CT images were obtained from the base of the skull to  the vertex without intravenous contrast. The images were reformatted and  are submitted in 3 mm thick axial, sagittal and coronal CT sections with  brain algorithm and 2 mm thick axial CT  sections with high-resolution  bone algorithm.     This is correlated to prior outside head CT 07/13/2023.     FINDINGS: There is mild patchy low-density in the periventricular white  matter consistent with mild small vessel disease. There is a 8 x 5 mm  old lacunar infarct extending from the genu of the right internal  capsule into the anterior right thalamus. The remainder of the brain  parenchyma is normal in attenuation. The ventricles are normal in size.  I see no focal mass effect. There is no midline shift. No extra-axial  fluid collections are identified. There is no evidence of acute  intracranial hemorrhage. No acute skull fracture is identified. The  calvarium and skull base are normal in appearance. The paranasal sinuses  and the mastoid air cells and middle ear cavities are clear.        Impression:      1. No change when compared to the outside head CT on 07/13/2023, 5 days  ago.  2. There is mild small vessel disease in the periventricular white  matter. There is an 8 x 5 mm old lacunar infarct extending from the genu  of the right internal capsule into the anterior right thalamus. The  remainder of the head CT is within normal limits. Specifically, no acute  skull fracture or intracranial hemorrhage is identified.     Radiation dose reduction techniques were utilized, including automated  exposure control and exposure modulation based on body size.     This report was finalized on 7/18/2023 5:38 PM by Dr. Joon Galvez M.D.       XR Chest 1 View [[id number]] Collected: 07/18/23 1244     Updated: 07/18/23 1249    Narrative:      ONE VIEW PORTABLE CHEST     HISTORY: Confusion. Hypertension. Recent head trauma.     FINDINGS: The lungs are well-expanded and clear. There is a small hiatus  hernia unchanged from 04/01/2021. The heart remains borderline enlarged.     This report was finalized on 7/18/2023 12:46 PM by Dr. Prateek Garcia M.D.       CT Outside Films [[id number]] Resulted: 07/18/23 1240      Updated: 07/18/23 1241    Narrative:      This procedure was auto-finalized with no dictation required.    CT Outside Films [[id number]] Resulted: 07/18/23 1238     Updated: 07/18/23 1239    Narrative:      This procedure was auto-finalized with no dictation required.          Results for orders placed during the hospital encounter of 12/10/21    Duplex Upper Extremity Art / Grafts - Right CAR    Interpretation Summary  · Right radial artery demonstrates color flow patency without evidence for pseudoaneurysm or AV fistula. No stenosis evident.  · Patent right brachial and ulnar and radial arteries.    Results for orders placed during the hospital encounter of 07/18/23    Adult transthoracic echo complete    Interpretation Summary    Left ventricular systolic function is normal. Left ventricular ejection fraction appears to be 61 - 65%.    Left ventricular diastolic function was indeterminate.    Saline test results are negative.    Estimated right ventricular systolic pressure from tricuspid regurgitation is mildly elevated (35-45 mmHg).    Pertinent Labs     Results from last 7 days   Lab Units 07/19/23  1203 07/18/23  1222   WBC 10*3/mm3 5.32 4.68   HEMOGLOBIN g/dL 12.5 12.2   PLATELETS 10*3/mm3 169 162     Results from last 7 days   Lab Units 07/19/23  1203 07/18/23  1222   SODIUM mmol/L 141 142   POTASSIUM mmol/L 3.8 3.9   CHLORIDE mmol/L 105 107   CO2 mmol/L 27.4 27.7   BUN mg/dL 9 8   CREATININE mg/dL 0.74 0.64   GLUCOSE mg/dL 102* 98   EGFR mL/min/1.73 86.1 94.0     Results from last 7 days   Lab Units 07/19/23  1203 07/18/23  1222   ALBUMIN g/dL 3.3* 3.5   BILIRUBIN mg/dL 0.7 0.6   ALK PHOS U/L 90 96   AST (SGOT) U/L 13 18   ALT (SGPT) U/L 8 6     Results from last 7 days   Lab Units 07/19/23  1203 07/18/23  1222   CALCIUM mg/dL 8.8 8.9   ALBUMIN g/dL 3.3* 3.5   MAGNESIUM mg/dL  --  1.9       Results from last 7 days   Lab Units 07/18/23  1222   HSTROP T ng/L 10*       Results from last 7 days   Lab Units  07/19/23  1203   CHOLESTEROL mg/dL 168   TRIGLYCERIDES mg/dL 48   HDL CHOL mg/dL 60   LDL CHOL mg/dL 98             Test Results Pending at Discharge       Discharge Details        Discharge Medications        New Medications        Instructions Start Date   atorvastatin 40 MG tablet  Commonly known as: LIPITOR   40 mg, Oral, Nightly             Continue These Medications        Instructions Start Date   aspirin 81 MG EC tablet   81 mg, Oral, Daily      cetirizine 10 MG tablet  Commonly known as: zyrTEC   10 mg, Oral, Daily      diclofenac 50 MG EC tablet  Commonly known as: VOLTAREN   50 mg, Oral, 2 Times Daily      DULoxetine 60 MG capsule  Commonly known as: CYMBALTA   60 mg, Oral, Daily      esomeprazole 40 MG capsule  Commonly known as: nexIUM   40 mg, Oral, Daily      metoprolol tartrate 25 MG tablet  Commonly known as: LOPRESSOR   25 mg, Oral, 2 Times Daily      tolterodine 2 MG tablet  Commonly known as: DETROL   2 mg, Oral, 2 Times Daily               Allergies   Allergen Reactions    Meperidine Rash       Discharge Disposition:  Home or Self Care      Discharge Diet:  No active diet order      Discharge Activity:       CODE STATUS:    Code Status and Medical Interventions:   Ordered at: 07/18/23 1803     Code Status (Patient has no pulse and is not breathing):    CPR (Attempt to Resuscitate)     Medical Interventions (Patient has pulse or is breathing):    Full     Release to patient:    Routine Release       Future Appointments   Date Time Provider Department Center   9/21/2023  3:00 PM [facility] DANIELLE LUCAS [facility]   9/27/2023 10:00 AM Elle Pedro APRN [facility] [place]      Follow-up Information       Jonelle Joseph APRN .    Specialties: Nurse Practitioner, Family Medicine  Contact information:  [address] [facility] [address] [phone]               Lucila Ribeiro MD Follow up in 2 month(s).    Specialty: Neurology  Contact information:  [facility] [address]  [place] [address]  [phone]                             Time Spent on Discharge:  Greater than 55 minutes      HORTENSIA Timmons  [facility]  7/20/23  08:57 EDT

## 2023-07-20 NOTE — PROGRESS NOTES
Discharge Planning Assessment  [facility]     Patient Name: Samina Marin  MRN: [id number]  Today's Date: 7/20/2023    Admit Date: 7/18/2023    Plan: Home   Discharge Needs Assessment       Row Name 07/20/23 1023       Living Environment    People in Home alone    Current Living Arrangements home    Potentially Unsafe Housing Conditions none    Primary Care Provided by self    Provides Primary Care For no one    Family Caregiver if Needed child(diana), adult    Quality of Family Relationships supportive;involved;helpful    Able to Return to Prior Arrangements yes       Resource/Environmental Concerns    Resource/Environmental Concerns none       Transition Planning    Patient/Family Anticipates Transition to home    Patient/Family Anticipated Services at Transition none                   Discharge Plan       Row Name 07/20/23 1024       Plan    Plan Home    Plan Comments Spoke with pt to scrrren for DCP. Pt does reside at [facility] address where she does livbe alone,  Upon DC today pt will be going to her son's hometo stay for a few days prior to return back to her home.  Pt reports that she is totally independent with all her care needs and ambulation.  Pt confirmed her PCP as kvng [facility] and plans to use  [facility] med[facility] to bed at [place].  Pt voiced no DC needs at this time.                  Continued Care and Services - Admitted Since 7/18/2023    Coordination has not been started for this encounter.       Expected Discharge Date and Time       Expected Discharge Date Expected Discharge Time    Jul 20, 2023            Demographic Summary       Row Name 07/20/23 1022       General Information    Admission Type observation    Arrived From home    Referral Source admission list    Reason for Consult discharge planning    Preferred Language English                   Functional Status       Row Name 07/20/23 1023       Functional Status    Usual Activity Tolerance good    Current Activity Tolerance good        Functional Status, IADL    Medications independent    Meal Preparation independent    Housekeeping independent    Laundry independent    Shopping independent                   Psychosocial    No documentation.                  Abuse/Neglect    No documentation.                  Legal    No documentation.                  Substance Abuse    No documentation.                  Patient Forms    No documentation.                     AKASH Kruger

## 2023-07-21 PROBLEM — G43.109 MIGRAINE WITH AURA AND WITHOUT STATUS MIGRAINOSUS: Status: ACTIVE | Noted: 2023-07-21

## 2023-08-02 ENCOUNTER — OFFICE VISIT (OUTPATIENT)
Dept: FAMILY MEDICINE CLINIC | Facility: CLINIC | Age: 73
End: 2023-08-02
Payer: MEDICARE

## 2023-08-02 VITALS
OXYGEN SATURATION: 95 % | BODY MASS INDEX: 36.49 KG/M2 | HEART RATE: 79 BPM | DIASTOLIC BLOOD PRESSURE: 57 MMHG | WEIGHT: 219 LBS | HEIGHT: 65 IN | SYSTOLIC BLOOD PRESSURE: 118 MMHG

## 2023-08-02 DIAGNOSIS — Z00.00 MEDICARE ANNUAL WELLNESS VISIT, SUBSEQUENT: Primary | ICD-10-CM

## 2023-08-02 RX ORDER — ASPIRIN 81 MG/1
81 TABLET, CHEWABLE ORAL DAILY
COMMUNITY

## 2023-09-20 NOTE — PROGRESS NOTES
CC: hospital f/u    HPI:  Samina Marin is a  72 y.o.  right-handed female with hypertension, SVT status post ablation 2014, history of migraine with aura, prior stroke found on imaging, and depression who is being seen today for hospital follow-up.  She is accompanied by her son and daughter-in-law.      In July 2023 she presented to [facility] with a headache and word finding difficulty.  Today she indicates that symptoms started after a fall hitting her head in the shower in [place] in July was the beginning of all of her symptoms.  After that fall she went to the hospital in [place] and had a laceration repair.  I can see CT head and CT cervical spine were ordered and CT head showed chronic right basal ganglia stroke but no acute findings.  While admitted to [facility] MRI brain was completed and showed no acute findings.  There was mild to moderate small vessel disease and an old internal capsule stroke.  CTA head and neck showed minimal fibromuscular dysplasia involving bilateral vertebral arteries but no significant stenosis or aneurysm.  2D echo was unremarkable.  Zio patch was placed but report is pending.  It was felt she may have migraine versus possible TIA.  Aspirin was decreased to 81 mg daily and Lipitor 40 mg daily was started.    Today she reports balance problems for years with recent fall at home that occurred while taking the trash out in flip-flops.  She denies any neuropathic symptoms but does state that her legs tingle when in the shower.  She does have chronic right hip problems that has affected her gait.  Since her fall in July hitting her head she has had worsening balance problems, more persistent headache with pain shooting behind both of her eyes, and development of memory loss.  She was recently evaluated for eye pain by Kandi who found no concerning eye issue.  She reports fatigue and does snore at night per her son.    She has never  had a sleep study.  Patient and family reports difficulty with short-term memory and difficulty with concentration.  For her headaches she takes Advil a few times a week.    In July 2023 TSH was 1.86, LDL 98, hemoglobin A1c 5.4%.    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  They have not cleared her to resume driving due to the stroke seen on CT head.    Of note, chart indicates previous history of A-fib status post ablation however upon review of cardiology notes there is no indication that the patient previously had A-fib, notes indicate she had SVT status post ablation in 2014.  She continues to follow with Dr. Enrique.  Past Medical History:   Diagnosis Date    Allergic rhinitis     Chronic bronchitis     Hypertension     Perianal abscess     Sinus bradycardia          Past Surgical History:   Procedure Laterality Date    BACK SURGERY      CARDIAC CATHETERIZATION N/A 12/9/2021    Procedure: Left Heart Cath;  Surgeon: Marily Maya MD;  Location:  [facility];  Service: Cardiology;  Laterality: N/A;    CARDIAC CATHETERIZATION N/A 12/9/2021    Procedure: Coronary angiography;  Surgeon: Marily Maya MD;  Location:  [facility];  Service: Cardiology;  Laterality: N/A;    CARDIAC CATHETERIZATION N/A 12/9/2021    Procedure: Left ventriculography;  Surgeon: Marily Maya MD;  Location:  [facility];  Service: Cardiology;  Laterality: N/A;    CARDIAC ELECTROPHYSIOLOGY STUDY AND ABLATION      EPIDURAL Right 11/7/2022    Procedure: LUMBAR/SACRAL TRANSFORAMINAL EPIDURAL RIGHT L4, L5;  Surgeon: Jonelle Abraham MD;  Location: [facility] MA[facility] OR;  Service: Pain Management;  Laterality: Right;    HYSTERECTOMY      MEDIAL BRANCH BLOCK Bilateral 1/4/2023    Procedure: LUMBAR MEDIAL BRANCH BLOCK BILATERAL L3-L5 #1;  Surgeon: Jonelle Abraham MD;  Location: [facility];  Service: Pain Management;  Laterality: Bilateral;    ROTATOR CUFF  REPAIR Right     TENDON REPAIR Right     SHOULDER           Current Outpatient Medications:     amLODIPine (NORVASC) 10 MG tablet, , Disp: , Rfl:     aspirin 81 MG chewable tablet, Chew 1 tablet Daily., Disp: , Rfl:     atorvastatin (LIPITOR) 40 MG tablet, Take 1 tablet by mouth Every Night., Disp: 90 tablet, Rfl: 0    cetirizine (zyrTEC) 10 MG tablet, Take 1 tablet by mouth Daily., Disp: 30 tablet, Rfl: 0    diclofenac (VOLTAREN) 50 MG EC tablet, Take 1 tablet by mouth 2 (Two) Times a Day., Disp: , Rfl:     DULoxetine (CYMBALTA) 60 MG capsule, Take 1 capsule by mouth Daily., Disp: 30 capsule, Rfl: 0    esomeprazole (nexIUM) 40 MG capsule, Take 1 capsule by mouth Every Morning Before Breakfast., Disp: , Rfl:     metoprolol tartrate (LOPRESSOR) 25 MG tablet, Take 1 tablet by mouth 2 (Two) Times a Day., Disp: 60 tablet, Rfl: 0    tolterodine (DETROL) 2 MG tablet, Take 1 tablet by mouth 2 (Two) Times a Day., Disp: 60 tablet, Rfl: 0    ibuprofen (ADVIL,MOTRIN) 800 MG tablet, TK 1 T PO Q 8 H FOR 3 DAYS THEN PRN (Patient not taking: Reported on 9/27/2023), Disp: , Rfl:     Riboflavin-Magnesium-Feverfew (MigreLief) 200-180-50 MG tablet, Take 1 tablet by mouth 2 (Two) Times a Day., Disp: , Rfl:       Family History   Problem Relation Age of Onset    Heart disease Sister     Ovarian cancer Sister     Liver cancer Sister     Heart disease Brother     Hypertension Sister     Pancreatic cancer Sister          Social History     Socioeconomic History    Marital status:    Tobacco Use    Smoking status: Never     Passive exposure: Never    Smokeless tobacco: Never    Tobacco comments:     CAFFEINE USE   Vaping Use    Vaping Use: Never used   Substance and Sexual Activity    Alcohol use: Yes     Comment: RARE    Drug use: Never    Sexual activity: Defer         Allergies   Allergen Reactions    Meperidine Rash         Pain Scale:        ROS:  Review of Systems   Constitutional:  Positive for fatigue. Negative for unexpected  "weight change.   HENT:  Negative for ear discharge, hearing loss, nosebleeds, tinnitus, trouble swallowing and voice change.    Eyes:  Negative for photophobia, pain and visual disturbance.   Respiratory:  Negative for chest tightness, shortness of breath and wheezing.    Cardiovascular:  Negative for chest pain and palpitations.   Musculoskeletal:  Positive for gait problem (balance, fall with injury).   Neurological:  Positive for speech difficulty.   Psychiatric/Behavioral:  Positive for confusion, decreased concentration and sleep disturbance (sleeplessness). Negative for behavioral problems.        ROS completed with MA was reviewed by me and agree.    Physical Exam:  Vitals:    09/27/23 1208   BP: 112/80   Pulse: 81   SpO2: 97%   Weight: 98.9 kg (218 lb)   Height: 165.1 cm (65\")     Orthostatic BP:    Body mass index is 36.28 kg/m².    General appearance: Well developed, well nourished, well groomed, alert and cooperative.   HEENT: Normocephalic.   Neck: Supple  Cardiac: Regular rate and rhythm. No murmurs.   Peripheral Vasculature: Radial pulses are equal and symmetric.  Chest Exam: Clear to auscultation bilaterally, no wheezes, no rhonchi.  Extremities: Normal, no edema.   Skin: No rashes or birthmarks.     Higher integrative function: Awake and alert, oriented 5 out of 6 on the [place].  She scored 20 out of 30 on the [place].  Recent/remote memory intact, mildly impaired concentration/attention.  Speech fluent.  No neglect.  CN II: Normal visual fields.   CN III IV VI: Extraocular movements are full without nystagmus. Pupils are equal, round, and reactive to light.   CN V: Normal facial sensation.  CN VII: Facial movements are symmetric, no weakness.   CN VIII: Auditory acuity is normal.   CN IX & X: Symmetric palatal movement.   CN XI: Sternocleidomastoid and trapezius are normal. No weakness.   CN XII: The tongue is midline. No atrophy or fasciculations.   Motor: Normal muscle strength, bulk, and tone in upper " and lower extremities. No fasciculations, rigidity, spasticity or abnormal movements.   Sensation: Normal light touch, pinprick, vibration and temperature sensation  Station and gait: Comes to stand easily.  Narrow-based, antalgic gait.  Muscle stretch reflexes: Reflexes are normal and symmetric in the upper and lower extremities.   Coordination: Finger to nose test showed no dysmetria.  Heel to shin normal.       Results:      Lab Results   Component Value Date    GLUCOSE 102 (H) 07/19/2023    BUN 9 07/19/2023    CREATININE 0.74 07/19/2023    EGFRIFNONA 95 12/07/2021    BCR 12.2 07/19/2023    CO2 27.4 07/19/2023    CALCIUM 8.8 07/19/2023    ALBUMIN 3.3 (L) 07/19/2023    LABIL2 1.2 (L) 03/22/2021    AST 13 07/19/2023    ALT 8 07/19/2023       Lab Results   Component Value Date    WBC 5.32 07/19/2023    HGB 12.5 07/19/2023    HCT 38.0 07/19/2023    MCV 84.8 07/19/2023     07/19/2023         .No results found for: RPR      Lab Results   Component Value Date    TSH 1.860 07/19/2023         No results found for: [id number]      No results found for: FOLATE      Lab Results   Component Value Date    HGBA1C 5.40 07/19/2023         Lab Results   Component Value Date    GLUCOSE 102 (H) 07/19/2023    BUN 9 07/19/2023    CREATININE 0.74 07/19/2023    EGFRIFNONA 95 12/07/2021    BCR 12.2 07/19/2023    K 3.8 07/19/2023    CO2 27.4 07/19/2023    CALCIUM 8.8 07/19/2023    ALBUMIN 3.3 (L) 07/19/2023    LABIL2 1.2 (L) 03/22/2021    AST 13 07/19/2023    ALT 8 07/19/2023         Lab Results   Component Value Date    WBC 5.32 07/19/2023    HGB 12.5 07/19/2023    HCT 38.0 07/19/2023    MCV 84.8 07/19/2023     07/19/2023             Assessment/Plan:     Ms. Marin was seen today for multiple complaints including persistent headache, memory loss, and worsening balance following fall hitting her head in July 2023.    We discussed any medication for headaches and she prefers to try over-the-counter before starting  prescription.  She has risk factors for SENG.  No neuropathic findings on exam, balance felt most related to hip arthritis and pain.    Diagnoses and all orders for this visit:    1. Memory loss (Primary)  -     Vitamin B12; Future  -     Folate; Future  -     RPR; Future  -     Ambulatory Referral to Speech Therapy    2. At risk for sleep apnea  -     Ambulatory Referral to Sleep Medicine    3. Post concussion syndrome  -     Ambulatory Referral to Speech Therapy    4. Migraine with aura and without status migrainosus, not intractable    5. History of stroke    Other orders  -     Riboflavin-Magnesium-Feverfew (MigreLief) 200-180-50 MG tablet; Take 1 tablet by mouth 2 (Two) Times a Day.     Sleep study to evaluate for SENG which could be contributing to headaches and memory loss   Start Migrelief OTC, consider long-term medication such as Topamax if this is not helpful   Check labs for treatable causes of memory loss: RPR, B12/folate   Continue aspirin 81 mg daily for stroke found on imaging previously   Blood pressure control to <130/80   Goal LDL <70-recommend high dose statins-continue Lipitor 40 mg daily    Serum glucose < 140   Call 911 for stroke any stroke symptoms   Recommend discussing depression management with PCP   Can consider referral for neuropsych evaluation, this was discussed today   Follow-up in 4 months             Total time: Greater than 40 minutes.      Dictated utilizing Dragon dictation.

## 2023-09-26 ENCOUNTER — HOSPITAL ENCOUNTER (OUTPATIENT)
Dept: MAMMOGRAPHY | Facility: HOSPITAL | Age: 73
Discharge: HOME | End: 2023-09-26
Admitting: NURSE PRACTITIONER
Payer: MEDICARE

## 2023-09-26 DIAGNOSIS — Z12.31 ENCOUNTER FOR SCREENING MAMMOGRAM FOR MALIGNANT NEOPLASM OF BREAST: ICD-10-CM

## 2023-09-26 PROCEDURE — 77063 BREAST TOMOSYNTHESIS BI: CPT

## 2023-09-26 PROCEDURE — 77067 SCR MAMMO BI INCL CAD: CPT

## 2023-09-27 ENCOUNTER — LAB (OUTPATIENT)
Dept: LAB | Facility: HOSPITAL | Age: 73
End: 2023-09-27
Payer: MEDICARE

## 2023-09-27 ENCOUNTER — OFFICE VISIT (OUTPATIENT)
Dept: NEUROLOGY | Facility: CLINIC | Age: 73
End: 2023-09-27
Payer: MEDICARE

## 2023-09-27 VITALS
SYSTOLIC BLOOD PRESSURE: 112 MMHG | BODY MASS INDEX: 36.32 KG/M2 | HEART RATE: 81 BPM | DIASTOLIC BLOOD PRESSURE: 80 MMHG | WEIGHT: 218 LBS | OXYGEN SATURATION: 97 % | HEIGHT: 65 IN

## 2023-09-27 DIAGNOSIS — F07.81 POST CONCUSSION SYNDROME: ICD-10-CM

## 2023-09-27 DIAGNOSIS — R41.3 MEMORY LOSS: Primary | ICD-10-CM

## 2023-09-27 DIAGNOSIS — Z91.89 AT RISK FOR SLEEP APNEA: ICD-10-CM

## 2023-09-27 DIAGNOSIS — G43.109 MIGRAINE WITH AURA AND WITHOUT STATUS MIGRAINOSUS, NOT INTRACTABLE: ICD-10-CM

## 2023-09-27 DIAGNOSIS — Z86.73 HISTORY OF STROKE: ICD-10-CM

## 2023-09-27 DIAGNOSIS — R41.3 MEMORY LOSS: ICD-10-CM

## 2023-09-27 PROBLEM — R29.818 ACUTE FOCAL NEUROLOGICAL DEFICIT: Status: RESOLVED | Noted: 2023-07-18 | Resolved: 2023-09-27

## 2023-09-27 LAB
FOLATE SERPL-MCNC: 11.5 NG/ML (ref 4.78–24.2)
RPR SER QL: NORMAL
VIT B12 BLD-MCNC: 385 PG/ML (ref 211–946)

## 2023-09-27 PROCEDURE — 82607 VITAMIN B-12: CPT

## 2023-09-27 PROCEDURE — 3074F SYST BP LT 130 MM HG: CPT | Performed by: NURSE PRACTITIONER

## 2023-09-27 PROCEDURE — 86592 SYPHILIS TEST NON-TREP QUAL: CPT

## 2023-09-27 PROCEDURE — 1160F RVW MEDS BY RX/DR IN RCRD: CPT | Performed by: NURSE PRACTITIONER

## 2023-09-27 PROCEDURE — 3079F DIAST BP 80-89 MM HG: CPT | Performed by: NURSE PRACTITIONER

## 2023-09-27 PROCEDURE — 82746 ASSAY OF FOLIC ACID SERUM: CPT

## 2023-09-27 PROCEDURE — 99215 OFFICE O/P EST HI 40 MIN: CPT | Performed by: NURSE PRACTITIONER

## 2023-09-27 PROCEDURE — 1159F MED LIST DOCD IN RCRD: CPT | Performed by: NURSE PRACTITIONER

## 2023-09-27 PROCEDURE — 36415 COLL VENOUS BLD VENIPUNCTURE: CPT

## 2023-09-27 RX ORDER — B2/MAGNESIUM CIT,OXID/FEVERFEW 200-180-50
1 TABLET ORAL 2 TIMES DAILY
Start: 2023-09-27

## 2023-09-27 RX ORDER — ESOMEPRAZOLE MAGNESIUM 40 MG/1
40 CAPSULE, DELAYED RELEASE ORAL
COMMUNITY

## 2023-09-27 NOTE — PATIENT INSTRUCTIONS
Start Migrelief per bottle instructions, take at at least 3 mo   Gets labs drawn  Someone will call you to schedule sleep medicine visit

## 2023-09-28 DIAGNOSIS — E53.8 B12 DEFICIENCY: Primary | ICD-10-CM

## 2023-10-09 ENCOUNTER — HOSPITAL ENCOUNTER (OUTPATIENT)
Dept: MRI IMAGING | Facility: HOSPITAL | Age: 73
Discharge: HOME | End: 2023-10-09
Admitting: NURSE PRACTITIONER
Payer: MEDICARE

## 2023-10-09 DIAGNOSIS — M25.512 ACUTE PAIN OF LEFT SHOULDER: ICD-10-CM

## 2023-10-09 PROCEDURE — 73221 MRI JOINT UPR EXTREM W/O DYE: CPT

## 2023-10-10 DIAGNOSIS — R93.6 ABNORMAL MRI, SHOULDER: Primary | ICD-10-CM

## 2023-10-11 DIAGNOSIS — F32.A DEPRESSION, UNSPECIFIED DEPRESSION TYPE: ICD-10-CM

## 2023-10-11 RX ORDER — DULOXETIN HYDROCHLORIDE 60 MG/1
60 CAPSULE, DELAYED RELEASE ORAL DAILY
Qty: 30 CAPSULE | Refills: 0 | Status: SHIPPED | OUTPATIENT
Start: 2023-10-11

## 2023-10-26 ENCOUNTER — TELEPHONE (OUTPATIENT)
Dept: NEUROLOGY | Facility: CLINIC | Age: 73
End: 2023-10-26

## 2023-10-26 NOTE — TELEPHONE ENCOUNTER
Caller: Samina Marin    Relationship: Self    Best call back number: [phone] (home) [phone] (work)      What was the call regarding: PATIENT IS ASKING FOR A LETTER STATEMENT FOR HER WORK STATING THAT SHE IS TO BE OFF WORK UNTIL SHE COMES BACK FOR HER FOLLOW UP APPT ON 2/7/23. SHE ASKS IF THE LETTER COULD BE MAILED TO HER AND SHE WILL TAKE IT TO HER WORK.     Is it okay if the provider responds through MyChart: NO    PLEASE REVIEW  THANK YOU

## 2023-10-26 NOTE — TELEPHONE ENCOUNTER
I provided phone number for pt to call to schedule an appt for ST.  If pt cannot get an appt in the next month-2months, pt will call back and Ryanne will referral to a different office.  Pt voice understanding and agrees.

## 2023-10-26 NOTE — TELEPHONE ENCOUNTER
Caller: Samina Marin    Relationship: Self    Best call back number: [phone] (home) [phone] (work)      What was the call regarding: PATIENT CALLED REGARDING THE REFERRAL SHE HAS FOR MEMORY THERAPY. SHE SAID THE FACILITY CALLED HER AND TOLD HER THEY WERE COMPLETELY BOOKED AND SHE HAS NOT HEARD FROM THE SINCE AND IT'S BEEN 1 MONTH. SHE ASKS IF THERE IS SOMEWHERE ELSE SHE CAN BE REFERRED TO IN ITS PLACE.     Is it okay if the provider responds through MyChart: YES    PLEASE REVIEW  THANK YOU

## 2023-10-27 ENCOUNTER — TELEPHONE (OUTPATIENT)
Dept: NEUROLOGY | Facility: CLINIC | Age: 73
End: 2023-10-27
Payer: MEDICARE

## 2023-11-27 DIAGNOSIS — F32.A DEPRESSION, UNSPECIFIED DEPRESSION TYPE: ICD-10-CM

## 2023-11-28 ENCOUNTER — HOSPITAL ENCOUNTER (OUTPATIENT)
Dept: SPEECH THERAPY | Facility: HOSPITAL | Age: 73
Setting detail: THERAPIES SERIES
Discharge: HOME | End: 2023-11-28
Payer: OTHER MISCELLANEOUS

## 2023-11-28 DIAGNOSIS — F07.81 POST CONCUSSION SYNDROME: Primary | ICD-10-CM

## 2023-11-28 DIAGNOSIS — R41.841 COGNITIVE COMMUNICATION DEFICIT: ICD-10-CM

## 2023-11-28 PROCEDURE — 96125 COGNITIVE TEST BY HC PRO: CPT | Performed by: SPEECH-LANGUAGE PATHOLOGIST

## 2023-11-28 RX ORDER — DULOXETIN HYDROCHLORIDE 60 MG/1
60 CAPSULE, DELAYED RELEASE ORAL DAILY
Qty: 90 CAPSULE | Refills: 0 | Status: SHIPPED | OUTPATIENT
Start: 2023-11-28

## 2023-11-28 NOTE — THERAPY EVALUATION
Outpatient Speech Language Pathology   Adult Speech Language Cognitive Initial Evaluation  [facility]     Patient Name: Samina Marin  : 1950  MRN: [id number]  Today's Date: 2023        Visit Date: 2023   Patient Active Problem List   Diagnosis    Allergic rhinitis    Aortic valve regurgitation    Arthritis    Asthma    Carpal tunnel syndrome, bilateral    DDD (degenerative disc disease), lumbar    Esophageal reflux    Essential hypertension    Fibromyalgia    Major depressive disorder    Mixed stress and urge urinary incontinence    Mood disorder    Obesity    Paroxysmal supraventricular tachycardia    Postmenopausal    Shortness of breath    Skin cancer    Vitamin D deficiency    Angina pectoris    Lumbar radiculopathy    Trochanteric bursitis of right hip    Muscle spasm    Thoracic spine pain    Lumbar facet arthropathy    Closed head injury    Migraine with aura and without status migrainosus        Past Medical History:   Diagnosis Date    Allergic rhinitis     Chronic bronchitis     Hypertension     Perianal abscess     Sinus bradycardia         Past Surgical History:   Procedure Laterality Date    BACK SURGERY      CARDIAC CATHETERIZATION N/A 2021    Procedure: Left Heart Cath;  Surgeon: Marily Maya MD;  Location: [facility] INVASIVE LOCATION;  Service: Cardiology;  Laterality: N/A;    CARDIAC CATHETERIZATION N/A 2021    Procedure: Coronary angiography;  Surgeon: Marily Maya MD;  Location: [facility] INVASIVE LOCATION;  Service: Cardiology;  Laterality: N/A;    CARDIAC CATHETERIZATION N/A 2021    Procedure: Left ventriculography;  Surgeon: Marily Maya MD;  Location: [facility] INVASIVE LOCATION;  Service: Cardiology;  Laterality: N/A;    CARDIAC ELECTROPHYSIOLOGY STUDY AND ABLATION      EPIDURAL Right 2022    Procedure: LUMBAR/SACRAL TRANSFORAMINAL EPIDURAL RIGHT L4, L5;  Surgeon: Jonelle Abraham MD;  Location: [facility] MAIN OR;  Service: Pain  Management;  Laterality: Right;    HYSTERECTOMY      MEDIAL BRANCH BLOCK Bilateral 1/4/2023    Procedure: LUMBAR MEDIAL BRANCH BLOCK BILATERAL L3-L5 #1;  Surgeon: Jonelle Abraham MD;  Location: [facility] MAIN OR;  Service: Pain Management;  Laterality: Bilateral;    ROTATOR CUFF REPAIR Right     TENDON REPAIR Right     SHOULDER         Visit Dx:    ICD-10-CM ICD-9-CM   1. Post concussion syndrome  F07.81 310.2   2. Cognitive communication deficit  R41.841 799.52            OP SLP Assessment/Plan - 11/28/23 1620          SLP Assessment    Functional Problems Speech Language- Adult/Cognition  -KA    Impact on Function: Adult Speech Language/Cognition Unable to complete specified job requirements;Difficulty participating in avocational activities  -KA    Clinical Impression: Speech Language-Adult/Congnition Mild:;Cognitive Communication Impairment  -KA    Please refer to paper survey for additional self-reported information Yes  -KA    Please refer to items scanned into chart for additional diagnostic informaiton and handouts as provided by clinician Yes  -KA    Prognosis Good (comment)  -KA    Patient/caregiver participated in establishment of treatment plan and goals Yes  -KA    Patient would benefit from skilled therapy intervention Yes  -KA       SLP Plan    Frequency 1x a week  -KA    Duration 8 weeks  -KA    Planned CPT's? SLP DEV COG SKILLS INITIAL (15 MIN) : 67242;SLP DEV COG SKILLS ADD (15 MIN) : 58475  -KA    Expected Duration of Therapy Session (SLP Eval) 45  -KA              User Key  (r) = Recorded By, (t) = Taken By, (c) = Cosigned By      Initials Name Provider Type    KA Dilan Sims MA,CCC-SLP Speech and Language Pathologist                     SLP SLC Evaluation - 11/28/23 1500          Communication Assessment/Intervention    Document Type evaluation  -KA    Subjective Information no complaints  -KA    Patient Observations alert;cooperative  -KA    Patient Effort good  -KA    Symptoms Noted  "During/After Treatment none  -KA       General Information    Patient Profile Reviewed yes  -KA    Pertinent History Of Current Problem Patient referred for memory loss and post concussion syndrome. Patient has history of fall and hitting head in July, Patient hospitalized in July and CT showed chronic right basal ganglia stroke but no acute findings. Patient reports difficulty with short term memory, concentration and word finding difficulty since then. She reports when she talks her words get \"jumbled.\" Of note pt has history of depression, SLP discussed if there was increase in depression or anxiety since fall and pt stated yes to anxiety. Patient reports to feeling anxious when driving especially with traffic. SLP recommended pt f/u with her physician regarding her anxiety.  -KA    Precautions/Limitations, Vision WFL with corrective lenses  -KA    Precautions/Limitations, Hearing WFL  -KA    Patient Level of Education Patient works as a  and hit her head in hotel while in [place]  -KA    Plans/Goals Discussed with patient  -KA    Barriers to Rehab none identified  -KA    Patient's Goals for Discharge functional cognition;functional communication;return to work  -KA    Standardized Assessment Used CLQT  -KA       Standardized Tests    Cognitive/Memory Tests CLQT: Cognitive Linguistic Quick Test  -KA       CLQT (The Cognitive Linguistic Quick Test)    Attention Domain Score 196  -KA    Attention Severity Rating 4: WNL  -KA    Memory Domain Score 130  -KA    Memory Severity Rating 3: Mild  -KA    Executive Function Domain Score 30  -KA    Executive Function Severity Rating 4: WNL  -KA    Language Domain Score 27  -KA    Language Severity Rating 3: Mild  -KA    Visuospatial Domain Score 92  -KA    Visuospatial Severity Rating 4: WNL  -KA    Clock Drawing Total Score 12  -KA    Clock Drawing Severity Rating WNL  -KA    Composite Severity Rating 3.6  -KA    Composite Severity Rating Range 4.0 - 3.5: WNL  " -KA    CLQT Comments Patient demonstrates mild impairment in  memory and language. In story retelling task pt recalled 8 facts out of 18. In generative naming task pt named 16 items in concrete category in 60 seconds however only 4 in abstract category. Patients strenghts include mental flexibility, attention, planning and organization. Discussed recommend approximately 8 weeks of therapy to target education and implementation of memory and word finding strategies.  -KA       SLP Evaluation Clinical Impressions    SLP Diagnosis mild;cognitive-linguistic disorder  -KA    Rehab Potential/Prognosis good  -KA    SLC Criteria for Skilled Therapy Interventions Met yes  -KA    Functional Impact difficulty completing vocational tasks  -KA       Recommendations    Therapy Frequency (SLP SLC) 1 day per week  -KA    Predicted Duration Therapy Intervention (Days) until discharge  -KA    Anticipated Discharge Disposition (SLP) home  -KA              User Key  (r) = Recorded By, (t) = Taken By, (c) = Cosigned By      Initials Name Provider Type    Dilan Hinojosa MA,CCC-SLP Speech and Language Pathologist                                    OP SLP Education       Row Name 11/28/23 1620       Education    Barriers to Learning No barriers identified  -KA    Education Provided Described results of evaluation;Patient expressed understanding of evaluation  -KA    Assessed Learning needs;Learning motivation  -KA    Learning Motivation Strong  -    Learning Method Explanation  -KA    Teaching Response Verbalized understanding  -KA              User Key  (r) = Recorded By, (t) = Taken By, (c) = Cosigned By      Initials Name Effective Dates    Dilan Hinojosa MA,CCC-SLP 07/11/23 -                    SLP OP Goals       Row Name 11/28/23 1600          Goal Type Needed    Goal Type Needed Memory;Verbal Expression  -KA        Verbal Expression Goals    Verbal Expression LTG's Patient will be able to use verbal expressive language  skills to communicate effectively in social/avocational/work setting  -KA     Patient will be able to use verbal expressive language skills to communicate effectively in social/avocational/work setting 90%:;without cues  -KA     Status: Patient will be able to use verbal expressive language skills to communicate effectively in social/avocational/work setting New  -KA     Verbal Expression STG's Patient will improve verbal expressive language skills by describing attributes, function, action and/or uses of an object/item;Patient will improve verbal expressive language skills by completing divergent naming tasks  -KA     Patient will improve verbal expressive language skills by completing divergent naming tasks 90%:;without cues  -KA     Status: Patient will improve verbal expressive language skills by completing divergent naming tasks New  -KA     Patient will improve verbal expressive language skills by describing attributes, function, action and/or uses of an object/item 90%:;without cues  -KA     Status: Patient will improve verbal expressive language skills by describing attributes, function, action and/or uses of an object/item New  -KA        Memory Goals    Memory LTG's Patient and family will implement compensatory strategies to maximize patient’s Memory function so patient can continue to participate in daily activities  -KA     Patient and family will implement compensatory strategies to maximize patient’s Memory function so patient can continue to participate in daily activities 90%:;without cues  -KA     Status: Patient and family will implement compensatory strategies to maximize patient’s Memory function so patient can continue to participate in daily activities New  -KA     Memory STG's Patient will demonstrate improved ability to recall information by listening to paragraph and answering yes/no questions;Patient’s memory skills will be enhanced as reported by patient by utilizing internal memory  strategies to recall up to 3 pieces of information after a 5- minute delay;Patient’s memory skills will be enhanced as reported by patient by using external memory aides  -KA     Patient’s memory skills will be enhanced as reported by patient by utilizing internal memory strategies to recall up to 3 pieces of information after a 5- minute delay 90%:;without cues  -KA     Status: Patient’s memory skills will be enhanced as reported by patient by utilizing internal memory strategies to recall up to 3 pieces of information after a 5- minute delay New  -KA     Patient’s memory skills will be enhanced as reported by patient by using external memory aides 90%:;without cues  -KA     Status: Patient’s memory skills will be enhanced as reported by patient by using external memory aides New  -KA     Patient will demonstrate improved ability to recall information by listening to paragraph and answering yes/no questions 90%:;without cues  -KA     Status: Patient will demonstrate improved ability to recall information by listening to paragraph and answering yes/no questions New  -KA               User Key  (r) = Recorded By, (t) = Taken By, (c) = Cosigned By      Initials Name Provider Type    Dilan Hinojosa MA,CCC-SLP Speech and Language Pathologist                    12:30 to 12:45  15:20 to 16:20     Time Calculation:   SLP Start Time: 1345  SLP Stop Time: 1430  SLP Time Calculation (min): 45 min  Timed Charges  96125-Standardized cognitive performance testin  Total Minutes  Timed Charges Total Minutes: 120   Total Minutes: 120    Therapy Charges for Today       Code Description Service Date Service Provider Modifiers Qty    61920153488  ST STD COG PERF TEST PER HOUR 2023 Dilan Sims MA,CCC-SLP GN 2                     Dilan Sims MA,CCC-SLP  2023

## 2023-11-30 ENCOUNTER — LAB (OUTPATIENT)
Dept: LAB | Facility: HOSPITAL | Age: 73
End: 2023-11-30
Payer: MEDICARE

## 2023-11-30 ENCOUNTER — TRANSCRIBE ORDERS (OUTPATIENT)
Dept: LAB | Facility: HOSPITAL | Age: 73
End: 2023-11-30
Payer: MEDICARE

## 2023-11-30 DIAGNOSIS — E53.8 B12 DEFICIENCY: ICD-10-CM

## 2023-11-30 DIAGNOSIS — M25.512 LEFT SHOULDER PAIN, UNSPECIFIED CHRONICITY: Primary | ICD-10-CM

## 2023-11-30 DIAGNOSIS — M25.512 LEFT SHOULDER PAIN, UNSPECIFIED CHRONICITY: ICD-10-CM

## 2023-11-30 LAB
ALBUMIN SERPL-MCNC: 4 G/DL (ref 3.5–5.2)
ALBUMIN/GLOB SERPL: 1.6 G/DL
ALP SERPL-CCNC: 117 U/L (ref 39–117)
ALT SERPL W P-5'-P-CCNC: 7 U/L (ref 1–33)
ANION GAP SERPL CALCULATED.3IONS-SCNC: 8.6 MMOL/L (ref 5–15)
AST SERPL-CCNC: 14 U/L (ref 1–32)
BASOPHILS # BLD AUTO: 0.05 10*3/MM3 (ref 0–0.2)
BASOPHILS NFR BLD AUTO: 0.9 % (ref 0–1.5)
BILIRUB SERPL-MCNC: 0.6 MG/DL (ref 0–1.2)
BUN SERPL-MCNC: 12 MG/DL (ref 8–23)
BUN/CREAT SERPL: 13 (ref 7–25)
CALCIUM SPEC-SCNC: 9.1 MG/DL (ref 8.6–10.5)
CHLORIDE SERPL-SCNC: 103 MMOL/L (ref 98–107)
CO2 SERPL-SCNC: 28.4 MMOL/L (ref 22–29)
CREAT SERPL-MCNC: 0.92 MG/DL (ref 0.57–1)
DEPRECATED RDW RBC AUTO: 40.4 FL (ref 37–54)
EGFRCR SERPLBLD CKD-EPI 2021: 66.3 ML/MIN/1.73
EOSINOPHIL # BLD AUTO: 0.13 10*3/MM3 (ref 0–0.4)
EOSINOPHIL NFR BLD AUTO: 2.3 % (ref 0.3–6.2)
ERYTHROCYTE [DISTWIDTH] IN BLOOD BY AUTOMATED COUNT: 13.1 % (ref 12.3–15.4)
GLOBULIN UR ELPH-MCNC: 2.5 GM/DL
GLUCOSE SERPL-MCNC: 113 MG/DL (ref 65–99)
HCT VFR BLD AUTO: 40.6 % (ref 34–46.6)
HGB BLD-MCNC: 13.8 G/DL (ref 12–15.9)
IMM GRANULOCYTES # BLD AUTO: 0.02 10*3/MM3 (ref 0–0.05)
IMM GRANULOCYTES NFR BLD AUTO: 0.4 % (ref 0–0.5)
LYMPHOCYTES # BLD AUTO: 1.48 10*3/MM3 (ref 0.7–3.1)
LYMPHOCYTES NFR BLD AUTO: 26.5 % (ref 19.6–45.3)
MCH RBC QN AUTO: 28.7 PG (ref 26.6–33)
MCHC RBC AUTO-ENTMCNC: 34 G/DL (ref 31.5–35.7)
MCV RBC AUTO: 84.4 FL (ref 79–97)
MONOCYTES # BLD AUTO: 0.36 10*3/MM3 (ref 0.1–0.9)
MONOCYTES NFR BLD AUTO: 6.4 % (ref 5–12)
NEUTROPHILS NFR BLD AUTO: 3.55 10*3/MM3 (ref 1.7–7)
NEUTROPHILS NFR BLD AUTO: 63.5 % (ref 42.7–76)
NRBC BLD AUTO-RTO: 0 /100 WBC (ref 0–0.2)
PLATELET # BLD AUTO: 197 10*3/MM3 (ref 140–450)
PMV BLD AUTO: 9.6 FL (ref 6–12)
POTASSIUM SERPL-SCNC: 3.8 MMOL/L (ref 3.5–5.2)
PROT SERPL-MCNC: 6.5 G/DL (ref 6–8.5)
RBC # BLD AUTO: 4.81 10*6/MM3 (ref 3.77–5.28)
SODIUM SERPL-SCNC: 140 MMOL/L (ref 136–145)
VIT B12 BLD-MCNC: 675 PG/ML (ref 211–946)
WBC NRBC COR # BLD AUTO: 5.59 10*3/MM3 (ref 3.4–10.8)

## 2023-11-30 PROCEDURE — 80053 COMPREHEN METABOLIC PANEL: CPT

## 2023-11-30 PROCEDURE — 85025 COMPLETE CBC W/AUTO DIFF WBC: CPT

## 2023-11-30 PROCEDURE — 82607 VITAMIN B-12: CPT

## 2023-11-30 PROCEDURE — 36415 COLL VENOUS BLD VENIPUNCTURE: CPT

## 2023-12-05 ENCOUNTER — APPOINTMENT (OUTPATIENT)
Dept: SPEECH THERAPY | Facility: HOSPITAL | Age: 73
End: 2023-12-05
Payer: OTHER MISCELLANEOUS

## 2023-12-08 ENCOUNTER — HOSPITAL ENCOUNTER (OUTPATIENT)
Dept: SPEECH THERAPY | Facility: HOSPITAL | Age: 73
Setting detail: THERAPIES SERIES
Discharge: HOME | End: 2023-12-08
Payer: OTHER MISCELLANEOUS

## 2023-12-08 DIAGNOSIS — F07.81 POST CONCUSSION SYNDROME: Primary | ICD-10-CM

## 2023-12-08 DIAGNOSIS — R41.841 COGNITIVE COMMUNICATION DEFICIT: ICD-10-CM

## 2023-12-08 PROCEDURE — 97130 THER IVNTJ EA ADDL 15 MIN: CPT | Performed by: SPEECH-LANGUAGE PATHOLOGIST

## 2023-12-08 PROCEDURE — 97129 THER IVNTJ 1ST 15 MIN: CPT | Performed by: SPEECH-LANGUAGE PATHOLOGIST

## 2023-12-08 NOTE — THERAPY TREATMENT NOTE
Outpatient Speech Language Pathology   Adult Speech Language Cognitive Treatment Note  [facility]     Patient Name: Samina Marin  : 1950  MRN: [id number]  Today's Date: 2023         Visit Date: 2023   Patient Active Problem List   Diagnosis    Allergic rhinitis    Aortic valve regurgitation    Arthritis    Asthma    Carpal tunnel syndrome, bilateral    DDD (degenerative disc disease), lumbar    Esophageal reflux    Essential hypertension    Fibromyalgia    Major depressive disorder    Mixed stress and urge urinary incontinence    Mood disorder    Obesity    Paroxysmal supraventricular tachycardia    Postmenopausal    Shortness of breath    Skin cancer    Vitamin D deficiency    Angina pectoris    Lumbar radiculopathy    Trochanteric bursitis of right hip    Muscle spasm    Thoracic spine pain    Lumbar facet arthropathy    Closed head injury    Migraine with aura and without status migrainosus          Visit Dx:    ICD-10-CM ICD-9-CM   1. Post concussion syndrome  F07.81 310.2   2. Cognitive communication deficit  R41.841 799.52                              SLP OP Goals       Row Name 23 1500          Subjective Comments    Subjective Comments Patient is pleasant and cooperative.  -KA        Verbal Expression Goals    Patient will improve verbal expressive language skills by completing divergent naming tasks 90%:;without cues  -KA     Status: Patient will improve verbal expressive language skills by completing divergent naming tasks New  -KA     Comments: Patient will improve verbal expressive language skills by completing divergent naming tasks Average of 10 in concrete category and 8 in abstract without cues. 80% with abstract convergent naming task.  -KA     Patient will improve verbal expressive language skills by describing attributes, function, action and/or uses of an object/item 90%:;without cues  -KA     Status: Patient will improve verbal expressive language skills by  describing attributes, function, action and/or uses of an object/item New  -KA        Memory Goals    Patient’s memory skills will be enhanced as reported by patient by utilizing internal memory strategies to recall up to 3 pieces of information after a 5- minute delay 90%:;without cues  -KA     Status: Patient’s memory skills will be enhanced as reported by patient by utilizing internal memory strategies to recall up to 3 pieces of information after a 5- minute delay New  -KA     Comments: Patient’s memory skills will be enhanced as reported by patient by utilizing internal memory strategies to recall up to 3 pieces of information after a 5- minute delay Education completed on internal  memory strategies including visualization and repetition, pt practiced strategies with delayed recall of three unrelated words, 1/3 33% without cues after 10 minute delay and 2/3 66% without cues after another 10 minute delay and 100% with min cues. Immediate recall of three words and repeating in correct order in memory and mental mantipulation task-90% with occasional repetition required at times.  -KA               User Key  (r) = Recorded By, (t) = Taken By, (c) = Cosigned By      Initials Name Provider Type    Dilan Hinojosa MA,CCC-SLP Speech and Language Pathologist                      Education also completed on external memory strategies including calendars, post it notes, dry eraser board.      Time Calculation:   SLP Start Time: 1430  SLP Stop Time: 1515  SLP Time Calculation (min): 45 min  Timed Charges  03203-SV Dev of Cogn Skills Initial Minutes: 15  80818-IC Dev of Cogn Skills Add Minutes: 30  Total Minutes  Timed Charges Total Minutes: 45   Total Minutes: 45    Therapy Charges for Today       Code Description Service Date Service Provider Modifiers Qty    14628255861 HC ST DEV OF COGN SKILLS INITIAL 15 MIN 12/8/2023 Dilan Sims MA,CCC-SLP  1    64242473328 HC ST DEV OF COGN SKILLS EACH ADDT'L 15 MIN 12/8/2023  Dilan Sims MA,CCC-SLP  2                     Dilan Sims MA,CCC-SLP  12/8/2023

## 2023-12-12 ENCOUNTER — APPOINTMENT (OUTPATIENT)
Dept: SPEECH THERAPY | Facility: HOSPITAL | Age: 73
End: 2023-12-12
Payer: OTHER MISCELLANEOUS

## 2023-12-19 ENCOUNTER — HOSPITAL ENCOUNTER (OUTPATIENT)
Dept: SPEECH THERAPY | Facility: HOSPITAL | Age: 73
Setting detail: THERAPIES SERIES
Discharge: HOME | End: 2023-12-19
Payer: OTHER MISCELLANEOUS

## 2023-12-19 DIAGNOSIS — F07.81 POST CONCUSSION SYNDROME: Primary | ICD-10-CM

## 2023-12-19 DIAGNOSIS — R41.841 COGNITIVE COMMUNICATION DEFICIT: ICD-10-CM

## 2023-12-19 PROCEDURE — 97130 THER IVNTJ EA ADDL 15 MIN: CPT | Performed by: SPEECH-LANGUAGE PATHOLOGIST

## 2023-12-19 PROCEDURE — 97129 THER IVNTJ 1ST 15 MIN: CPT | Performed by: SPEECH-LANGUAGE PATHOLOGIST

## 2023-12-19 NOTE — THERAPY TREATMENT NOTE
Outpatient Speech Language Pathology   Adult Speech Language Cognitive Treatment Note  [facility]     Patient Name: Samina Marin  : 1950  MRN: [id number]  Today's Date: 2023         Visit Date: 2023   Patient Active Problem List   Diagnosis    Allergic rhinitis    Aortic valve regurgitation    Arthritis    Asthma    Carpal tunnel syndrome, bilateral    DDD (degenerative disc disease), lumbar    Esophageal reflux    Essential hypertension    Fibromyalgia    Major depressive disorder    Mixed stress and urge urinary incontinence    Mood disorder    Obesity    Paroxysmal supraventricular tachycardia    Postmenopausal    Shortness of breath    Skin cancer    Vitamin D deficiency    Angina pectoris    Lumbar radiculopathy    Trochanteric bursitis of right hip    Muscle spasm    Thoracic spine pain    Lumbar facet arthropathy    Closed head injury    Migraine with aura and without status migrainosus          Visit Dx:    ICD-10-CM ICD-9-CM   1. Post concussion syndrome  F07.81 310.2   2. Cognitive communication deficit  R41.841 799.52                              SLP OP Goals       Row Name 23 1500          Subjective Comments    Subjective Comments Patient is pleasant and cooperative.  -KA        Verbal Expression Goals    Patient will improve verbal expressive language skills by completing divergent naming tasks 90%:;without cues  -KA     Status: Patient will improve verbal expressive language skills by completing divergent naming tasks Progressing as expected  -KA     Comments: Patient will improve verbal expressive language skills by completing divergent naming tasks Average of 10 in concrete category and 8 in abstract without cues. 63% with abstract convergent naming task without cues and 90% with min cues.  -KA        Memory Goals    Patient’s memory skills will be enhanced as reported by patient by utilizing internal memory strategies to recall up to 3 pieces of information after a 5-  minute delay 90%:;without cues  -KA     Status: Patient’s memory skills will be enhanced as reported by patient by utilizing internal memory strategies to recall up to 3 pieces of information after a 5- minute delay Progressing as expected  -KA     Comments: Patient’s memory skills will be enhanced as reported by patient by utilizing internal memory strategies to recall up to 3 pieces of information after a 5- minute delay Delayed recall of three related words after 5 and 32 minute delay 100% without cues. Immediate recall of detail in picture scene 90% without cues. Repeating four words in correct order in memory and mental manipulation task 85% with repetitions required at times.  -KA     Patient will demonstrate improved ability to recall information by listening to paragraph and answering yes/no questions 90%:;without cues  -KA     Status: Patient will demonstrate improved ability to recall information by listening to paragraph and answering yes/no questions New  -KA     Comments: Patient will demonstrate improved ability to recall information by listening to paragraph and answering yes/no questions Recall of detail in voicemails (paragraph level) with multiple choice 80% without cues.  -KA               User Key  (r) = Recorded By, (t) = Taken By, (c) = Cosigned By      Initials Name Provider Type    Dilan Hinojosa MA,CCC-SLP Speech and Language Pathologist                           Time Calculation:   SLP Start Time: 1345  SLP Stop Time: 1430  SLP Time Calculation (min): 45 min  Timed Charges  90507-MM Dev of Cogn Skills Initial Minutes: 15  29389-MJ Dev of Cogn Skills Add Minutes: 30  Total Minutes  Timed Charges Total Minutes: 45   Total Minutes: 45    Therapy Charges for Today       Code Description Service Date Service Provider Modifiers Qty    18258150166 HC ST DEV OF COGN SKILLS INITIAL 15 MIN 12/19/2023 Dilan Sims MA,CCC-SLP  1    [id number] HC ST DEV OF COGN SKILLS EACH ADDT'L 15 MIN  12/19/2023 Dilan Sims MA,CCC-SLP  2                     Dilan Sims MA,CCC-SLP  12/19/2023

## 2023-12-26 ENCOUNTER — APPOINTMENT (OUTPATIENT)
Dept: SPEECH THERAPY | Facility: HOSPITAL | Age: 73
End: 2023-12-26
Payer: OTHER MISCELLANEOUS

## 2023-12-27 ENCOUNTER — PRE-ADMISSION TESTING (OUTPATIENT)
Dept: PREADMISSION TESTING | Facility: HOSPITAL | Age: 73
End: 2023-12-27
Payer: MEDICARE

## 2023-12-27 ENCOUNTER — HOSPITAL ENCOUNTER (OUTPATIENT)
Dept: GENERAL RADIOLOGY | Facility: HOSPITAL | Age: 73
Discharge: HOME | End: 2023-12-27
Payer: MEDICARE

## 2023-12-27 VITALS
HEART RATE: 91 BPM | BODY MASS INDEX: 37.22 KG/M2 | OXYGEN SATURATION: 94 % | TEMPERATURE: 97.1 F | SYSTOLIC BLOOD PRESSURE: 122 MMHG | WEIGHT: 218 LBS | HEIGHT: 64 IN | DIASTOLIC BLOOD PRESSURE: 85 MMHG | RESPIRATION RATE: 20 BRPM

## 2023-12-27 PROCEDURE — 71046 X-RAY EXAM CHEST 2 VIEWS: CPT

## 2023-12-27 RX ORDER — ACETAMINOPHEN 325 MG/1
975 TABLET ORAL EVERY 6 HOURS PRN
COMMUNITY

## 2023-12-27 NOTE — DISCHARGE INSTRUCTIONS
Take the following medications the morning of surgery:    Amlodipine   cymbalta  nexium  metoprolol  detrol    If you are on prescription narcotic pain medication to control your pain you may also take that medication the morning of surgery.    General Instructions:  Do not eat solid food after midnight the night before surgery.  You may drink clear liquids day of surgery but must stop at least one hour before your hospital arrival time.  It is beneficial for you to have a clear drink that contains carbohydrates the day of surgery.  We suggest a 12 to 20 ounce bottle of Gatorade or Powerade for non-diabetic patients or a 12 to 20 ounce bottle of G2 or Powerade Zero for diabetic patients. (Pediatric patients, are not advised to drink a 12 to 20 ounce carbohydrate drink)    Clear liquids are liquids you can see through.  Nothing red in color.     Plain water                               Sports drinks  Sodas                                   Gelatin (Jell-O)  Fruit juices without pulp such as white grape juice and apple juice  Popsicles that contain no fruit or yogurt  Tea or coffee (no cream or milk added)  Gatorade / Powerade  G2 / Powerade Zero    Infants may have breast milk up to four hours before surgery.  Infants drinking formula may drink formula up to six hours before surgery.   Patients who avoid smoking, chewing tobacco and alcohol for 4 weeks prior to surgery have a reduced risk of post-operative complications.  Quit smoking as many days before surgery as you can.  Do not smoke, use chewing tobacco or drink alcohol the day of surgery.   If applicable bring your C-PAP/ BI-PAP machine in with you to preop day of surgery.  Bring any papers given to you in the doctor’s office.  Wear clean comfortable clothes.  Do not wear contact lenses, false eyelashes or make-up.  Bring a case for your glasses.   Bring crutches or walker if applicable.  Remove all piercings.  Leave jewelry and any other valuables at  home.  Hair extensions with metal clips must be removed prior to surgery.  The Pre-Admission Testing nurse will instruct you to bring medications if unable to obtain an accurate list in Pre-Admission Testing.        If you were given a blood bank ID arm band remember to bring it with you the day of surgery.    Preventing a Surgical Site Infection:  For 2 to 3 days before surgery, avoid shaving with a razor because the razor can irritate skin and make it easier to develop an infection.    Any areas of open skin can increase the risk of a post-operative wound infection by allowing bacteria to enter and travel throughout the body.  Notify your surgeon if you have any skin wounds / rashes even if it is not near the expected surgical site.  The area will need assessed to determine if surgery should be delayed until it is healed.  The night prior to surgery shower using a fresh bar of anti-bacterial soap (such as Dial) and clean washcloth.  Sleep in a clean bed with clean clothing.  Do not allow pets to sleep with you.  Shower on the morning of surgery using a fresh bar of anti-bacterial soap (such as Dial) and clean washcloth.  Dry with a clean towel and dress in clean clothing.  Ask your surgeon if you will be receiving antibiotics prior to surgery.  Make sure you, your family, and all healthcare providers clean their hands with soap and water or an alcohol based hand  before caring for you or your wound.    Day of surgery:12/28/2023   0630  Your arrival time is approximately two hours before your scheduled surgery time.  Upon arrival, a Pre-op nurse and Anesthesiologist will review your health history, obtain vital signs, and answer questions you may have.  The only belongings needed at this time will be a list of your home medications and if applicable your C-PAP/BI-PAP machine.  A Pre-op nurse will start an IV and you may receive medication in preparation for surgery, including something to help you relax.      Please be aware that surgery does come with discomfort.  We want to make every effort to control your discomfort so please discuss any uncontrolled symptoms with your nurse.   Your doctor will most likely have prescribed pain medications.      If you are going home after surgery you will receive individualized written care instructions before being discharged.  A responsible adult must drive you to and from the hospital on the day of your surgery and stay with you for 24 hours.  Discharge prescriptions can be filled by the hospital pharmacy during regular pharmacy hours.  If you are having surgery late in the day/evening your prescription may be e-prescribed to your pharmacy.  Please verify your pharmacy hours or chose a 24 hour pharmacy to avoid not having access to your prescription because your pharmacy has closed for the day.    If you are staying overnight following surgery, you will be transported to your hospital room following the recovery period.  [facility] has all private rooms.    If you have any questions please call Pre-Admission Testing at [phone].  Deductibles and co-payments are collected on the day of service. Please be prepared to pay the required co-pay, deductible or deposit on the day of service as defined by your plan.    Call your surgeon immediately if you experience any of the following symptoms:  Sore Throat  Shortness of Breath or difficulty breathing  Cough  Chills  Body soreness or muscle pain  Headache  Fever  New loss of taste or smell  Do not arrive for your surgery ill.  Your procedure will need to be rescheduled to another time.  You will need to call your physician before the day of surgery to avoid any unnecessary exposure to hospital staff as well as other patients.

## 2023-12-28 ENCOUNTER — ANESTHESIA (OUTPATIENT)
Dept: PERIOP | Facility: HOSPITAL | Age: 73
End: 2023-12-28
Payer: MEDICARE

## 2023-12-28 ENCOUNTER — ANESTHESIA EVENT (OUTPATIENT)
Dept: PERIOP | Facility: HOSPITAL | Age: 73
End: 2023-12-28
Payer: MEDICARE

## 2023-12-28 ENCOUNTER — HOSPITAL ENCOUNTER (OUTPATIENT)
Facility: HOSPITAL | Age: 73
Setting detail: HOSPITAL OUTPATIENT SURGERY
Discharge: HOME | End: 2023-12-28
Attending: STUDENT IN AN ORGANIZED HEALTH CARE EDUCATION/TRAINING PROGRAM | Admitting: STUDENT IN AN ORGANIZED HEALTH CARE EDUCATION/TRAINING PROGRAM
Payer: MEDICARE

## 2023-12-28 VITALS
HEART RATE: 86 BPM | RESPIRATION RATE: 18 BRPM | SYSTOLIC BLOOD PRESSURE: 131 MMHG | OXYGEN SATURATION: 91 % | DIASTOLIC BLOOD PRESSURE: 93 MMHG | TEMPERATURE: 97.4 F

## 2023-12-28 DIAGNOSIS — M75.102 TEAR OF LEFT ROTATOR CUFF, UNSPECIFIED TEAR EXTENT, UNSPECIFIED WHETHER TRAUMATIC: Primary | ICD-10-CM

## 2023-12-28 PROCEDURE — 25010000002 FENTANYL CITRATE (PF) 50 MCG/ML SOLUTION: Performed by: STUDENT IN AN ORGANIZED HEALTH CARE EDUCATION/TRAINING PROGRAM

## 2023-12-28 PROCEDURE — C1713 ANCHOR/SCREW BN/BN,TIS/BN: HCPCS | Performed by: STUDENT IN AN ORGANIZED HEALTH CARE EDUCATION/TRAINING PROGRAM

## 2023-12-28 PROCEDURE — 25010000002 SUGAMMADEX 200 MG/2ML SOLUTION: Performed by: NURSE ANESTHETIST, CERTIFIED REGISTERED

## 2023-12-28 PROCEDURE — 25010000002 DEXAMETHASONE SODIUM PHOSPHATE 20 MG/5ML SOLUTION: Performed by: STUDENT IN AN ORGANIZED HEALTH CARE EDUCATION/TRAINING PROGRAM

## 2023-12-28 PROCEDURE — 25010000002 PROPOFOL 200 MG/20ML EMULSION: Performed by: NURSE ANESTHETIST, CERTIFIED REGISTERED

## 2023-12-28 PROCEDURE — 25010000002 CEFAZOLIN IN DEXTROSE 2-4 GM/100ML-% SOLUTION: Performed by: STUDENT IN AN ORGANIZED HEALTH CARE EDUCATION/TRAINING PROGRAM

## 2023-12-28 PROCEDURE — 25010000002 ROPIVACAINE PER 1 MG: Performed by: STUDENT IN AN ORGANIZED HEALTH CARE EDUCATION/TRAINING PROGRAM

## 2023-12-28 PROCEDURE — 25010000002 ONDANSETRON PER 1 MG: Performed by: NURSE ANESTHETIST, CERTIFIED REGISTERED

## 2023-12-28 PROCEDURE — 25810000003 LACTATED RINGERS PER 1000 ML: Performed by: STUDENT IN AN ORGANIZED HEALTH CARE EDUCATION/TRAINING PROGRAM

## 2023-12-28 DEVICE — SUTURETAPE FIBERLINK 1.3MM WH/BL
Type: IMPLANTABLE DEVICE | Site: SHOULDER | Status: FUNCTIONAL
Brand: ARTHREX®

## 2023-12-28 DEVICE — 5.5MM BC KNOTLESS SWIVELOCK
Type: IMPLANTABLE DEVICE | Site: SHOULDER | Status: FUNCTIONAL
Brand: ARTHREX®

## 2023-12-28 DEVICE — TIGERTAPE
Type: IMPLANTABLE DEVICE | Site: SHOULDER | Status: FUNCTIONAL
Brand: ARTHREX®

## 2023-12-28 DEVICE — LNT IMPLANT SYSTEM, 4.75 BC KL SWIVELOCK
Type: IMPLANTABLE DEVICE | Site: SHOULDER | Status: FUNCTIONAL
Brand: ARTHREX®

## 2023-12-28 RX ORDER — HYDROMORPHONE HYDROCHLORIDE 1 MG/ML
0.25 INJECTION, SOLUTION INTRAMUSCULAR; INTRAVENOUS; SUBCUTANEOUS
Status: DISCONTINUED | OUTPATIENT
Start: 2023-12-28 | End: 2023-12-28 | Stop reason: HOSPADM

## 2023-12-28 RX ORDER — DROPERIDOL 2.5 MG/ML
0.62 INJECTION, SOLUTION INTRAMUSCULAR; INTRAVENOUS
Status: DISCONTINUED | OUTPATIENT
Start: 2023-12-28 | End: 2023-12-28 | Stop reason: HOSPADM

## 2023-12-28 RX ORDER — EPHEDRINE SULFATE 50 MG/ML
5 INJECTION, SOLUTION INTRAVENOUS ONCE AS NEEDED
Status: DISCONTINUED | OUTPATIENT
Start: 2023-12-28 | End: 2023-12-28 | Stop reason: HOSPADM

## 2023-12-28 RX ORDER — PROPOFOL 10 MG/ML
INJECTION, EMULSION INTRAVENOUS AS NEEDED
Status: DISCONTINUED | OUTPATIENT
Start: 2023-12-28 | End: 2023-12-28 | Stop reason: SURG

## 2023-12-28 RX ORDER — ONDANSETRON 2 MG/ML
INJECTION INTRAMUSCULAR; INTRAVENOUS AS NEEDED
Status: DISCONTINUED | OUTPATIENT
Start: 2023-12-28 | End: 2023-12-28 | Stop reason: SURG

## 2023-12-28 RX ORDER — SODIUM CHLORIDE 0.9 % (FLUSH) 0.9 %
3 SYRINGE (ML) INJECTION EVERY 12 HOURS SCHEDULED
Status: DISCONTINUED | OUTPATIENT
Start: 2023-12-28 | End: 2023-12-28 | Stop reason: HOSPADM

## 2023-12-28 RX ORDER — CEFAZOLIN SODIUM 2 G/100ML
2000 INJECTION, SOLUTION INTRAVENOUS ONCE
Status: COMPLETED | OUTPATIENT
Start: 2023-12-28 | End: 2023-12-28

## 2023-12-28 RX ORDER — IPRATROPIUM BROMIDE AND ALBUTEROL SULFATE 2.5; .5 MG/3ML; MG/3ML
3 SOLUTION RESPIRATORY (INHALATION) ONCE AS NEEDED
Status: DISCONTINUED | OUTPATIENT
Start: 2023-12-28 | End: 2023-12-28 | Stop reason: HOSPADM

## 2023-12-28 RX ORDER — ONDANSETRON 2 MG/ML
4 INJECTION INTRAMUSCULAR; INTRAVENOUS ONCE AS NEEDED
Status: DISCONTINUED | OUTPATIENT
Start: 2023-12-28 | End: 2023-12-28 | Stop reason: HOSPADM

## 2023-12-28 RX ORDER — LABETALOL HYDROCHLORIDE 5 MG/ML
5 INJECTION, SOLUTION INTRAVENOUS
Status: DISCONTINUED | OUTPATIENT
Start: 2023-12-28 | End: 2023-12-28 | Stop reason: HOSPADM

## 2023-12-28 RX ORDER — FLUMAZENIL 0.1 MG/ML
0.2 INJECTION INTRAVENOUS AS NEEDED
Status: DISCONTINUED | OUTPATIENT
Start: 2023-12-28 | End: 2023-12-28 | Stop reason: HOSPADM

## 2023-12-28 RX ORDER — DEXAMETHASONE SODIUM PHOSPHATE 4 MG/ML
INJECTION, SOLUTION INTRA-ARTICULAR; INTRALESIONAL; INTRAMUSCULAR; INTRAVENOUS; SOFT TISSUE
Status: COMPLETED | OUTPATIENT
Start: 2023-12-28 | End: 2023-12-28

## 2023-12-28 RX ORDER — HYDROCODONE BITARTRATE AND ACETAMINOPHEN 7.5; 325 MG/1; MG/1
1 TABLET ORAL EVERY 4 HOURS PRN
Status: DISCONTINUED | OUTPATIENT
Start: 2023-12-28 | End: 2023-12-28 | Stop reason: HOSPADM

## 2023-12-28 RX ORDER — PHENYLEPHRINE HCL IN 0.9% NACL 1 MG/10 ML
SYRINGE (ML) INTRAVENOUS AS NEEDED
Status: DISCONTINUED | OUTPATIENT
Start: 2023-12-28 | End: 2023-12-28 | Stop reason: SURG

## 2023-12-28 RX ORDER — SODIUM CHLORIDE 0.9 % (FLUSH) 0.9 %
3-10 SYRINGE (ML) INJECTION AS NEEDED
Status: DISCONTINUED | OUTPATIENT
Start: 2023-12-28 | End: 2023-12-28 | Stop reason: HOSPADM

## 2023-12-28 RX ORDER — FENTANYL CITRATE 50 UG/ML
25 INJECTION, SOLUTION INTRAMUSCULAR; INTRAVENOUS
Status: DISCONTINUED | OUTPATIENT
Start: 2023-12-28 | End: 2023-12-28 | Stop reason: HOSPADM

## 2023-12-28 RX ORDER — ROPIVACAINE HYDROCHLORIDE 5 MG/ML
INJECTION, SOLUTION EPIDURAL; INFILTRATION; PERINEURAL
Status: COMPLETED | OUTPATIENT
Start: 2023-12-28 | End: 2023-12-28

## 2023-12-28 RX ORDER — SODIUM CHLORIDE, SODIUM LACTATE, POTASSIUM CHLORIDE, CALCIUM CHLORIDE 600; 310; 30; 20 MG/100ML; MG/100ML; MG/100ML; MG/100ML
9 INJECTION, SOLUTION INTRAVENOUS CONTINUOUS
Status: DISCONTINUED | OUTPATIENT
Start: 2023-12-28 | End: 2023-12-28 | Stop reason: HOSPADM

## 2023-12-28 RX ORDER — LIDOCAINE HYDROCHLORIDE 20 MG/ML
INJECTION, SOLUTION EPIDURAL; INFILTRATION; INTRACAUDAL; PERINEURAL AS NEEDED
Status: DISCONTINUED | OUTPATIENT
Start: 2023-12-28 | End: 2023-12-28 | Stop reason: SURG

## 2023-12-28 RX ORDER — PROMETHAZINE HYDROCHLORIDE 25 MG/1
25 SUPPOSITORY RECTAL ONCE AS NEEDED
Status: DISCONTINUED | OUTPATIENT
Start: 2023-12-28 | End: 2023-12-28 | Stop reason: HOSPADM

## 2023-12-28 RX ORDER — NALOXONE HCL 0.4 MG/ML
0.2 VIAL (ML) INJECTION AS NEEDED
Status: DISCONTINUED | OUTPATIENT
Start: 2023-12-28 | End: 2023-12-28 | Stop reason: HOSPADM

## 2023-12-28 RX ORDER — FENTANYL CITRATE 50 UG/ML
50 INJECTION, SOLUTION INTRAMUSCULAR; INTRAVENOUS ONCE AS NEEDED
Status: COMPLETED | OUTPATIENT
Start: 2023-12-28 | End: 2023-12-28

## 2023-12-28 RX ORDER — LIDOCAINE HYDROCHLORIDE 10 MG/ML
0.5 INJECTION, SOLUTION INFILTRATION; PERINEURAL ONCE AS NEEDED
Status: DISCONTINUED | OUTPATIENT
Start: 2023-12-28 | End: 2023-12-28 | Stop reason: HOSPADM

## 2023-12-28 RX ORDER — ACETAMINOPHEN 500 MG
1000 TABLET ORAL EVERY 8 HOURS
Qty: 180 TABLET | Refills: 1 | Status: SHIPPED | OUTPATIENT
Start: 2023-12-28 | End: 2024-02-26

## 2023-12-28 RX ORDER — DIPHENHYDRAMINE HYDROCHLORIDE 50 MG/ML
12.5 INJECTION INTRAMUSCULAR; INTRAVENOUS
Status: DISCONTINUED | OUTPATIENT
Start: 2023-12-28 | End: 2023-12-28 | Stop reason: HOSPADM

## 2023-12-28 RX ORDER — ONDANSETRON HYDROCHLORIDE 8 MG/1
8 TABLET, FILM COATED ORAL EVERY 8 HOURS PRN
Qty: 30 TABLET | Refills: 0 | Status: SHIPPED | OUTPATIENT
Start: 2023-12-28

## 2023-12-28 RX ORDER — TRANEXAMIC ACID 100 MG/ML
INJECTION, SOLUTION INTRAVENOUS AS NEEDED
Status: DISCONTINUED | OUTPATIENT
Start: 2023-12-28 | End: 2023-12-28 | Stop reason: SURG

## 2023-12-28 RX ORDER — HYDRALAZINE HYDROCHLORIDE 20 MG/ML
5 INJECTION INTRAMUSCULAR; INTRAVENOUS
Status: DISCONTINUED | OUTPATIENT
Start: 2023-12-28 | End: 2023-12-28 | Stop reason: HOSPADM

## 2023-12-28 RX ORDER — PROMETHAZINE HYDROCHLORIDE 25 MG/1
25 TABLET ORAL ONCE AS NEEDED
Status: DISCONTINUED | OUTPATIENT
Start: 2023-12-28 | End: 2023-12-28 | Stop reason: HOSPADM

## 2023-12-28 RX ORDER — OXYCODONE HYDROCHLORIDE 5 MG/1
5-10 TABLET ORAL EVERY 4 HOURS PRN
Qty: 30 TABLET | Refills: 0 | Status: SHIPPED | OUTPATIENT
Start: 2023-12-28

## 2023-12-28 RX ORDER — HYDROCODONE BITARTRATE AND ACETAMINOPHEN 5; 325 MG/1; MG/1
1 TABLET ORAL ONCE AS NEEDED
Status: DISCONTINUED | OUTPATIENT
Start: 2023-12-28 | End: 2023-12-28 | Stop reason: HOSPADM

## 2023-12-28 RX ORDER — ROCURONIUM BROMIDE 10 MG/ML
INJECTION, SOLUTION INTRAVENOUS AS NEEDED
Status: DISCONTINUED | OUTPATIENT
Start: 2023-12-28 | End: 2023-12-28 | Stop reason: SURG

## 2023-12-28 RX ADMIN — DEXAMETHASONE SODIUM PHOSPHATE 8 MG: 4 INJECTION, SOLUTION INTRAMUSCULAR; INTRAVENOUS at 11:20

## 2023-12-28 RX ADMIN — DEXAMETHASONE SODIUM PHOSPHATE 4 MG: 4 INJECTION, SOLUTION INTRAMUSCULAR; INTRAVENOUS at 08:10

## 2023-12-28 RX ADMIN — CEFAZOLIN SODIUM 2000 MG: 2 INJECTION, SOLUTION INTRAVENOUS at 10:53

## 2023-12-28 RX ADMIN — LIDOCAINE HYDROCHLORIDE 60 MG: 20 INJECTION, SOLUTION EPIDURAL; INFILTRATION; INTRACAUDAL; PERINEURAL at 11:03

## 2023-12-28 RX ADMIN — Medication 3 ML: at 10:54

## 2023-12-28 RX ADMIN — ONDANSETRON 4 MG: 2 INJECTION INTRAMUSCULAR; INTRAVENOUS at 12:28

## 2023-12-28 RX ADMIN — ROPIVACAINE HYDROCHLORIDE 20 ML: 5 INJECTION EPIDURAL; INFILTRATION; PERINEURAL at 08:10

## 2023-12-28 RX ADMIN — ROCURONIUM BROMIDE 50 MG: 10 INJECTION, SOLUTION INTRAVENOUS at 11:03

## 2023-12-28 RX ADMIN — FENTANYL CITRATE 50 MCG: 50 INJECTION, SOLUTION INTRAMUSCULAR; INTRAVENOUS at 08:10

## 2023-12-28 RX ADMIN — SUGAMMADEX 200 MG: 100 INJECTION, SOLUTION INTRAVENOUS at 12:42

## 2023-12-28 RX ADMIN — SODIUM CHLORIDE, POTASSIUM CHLORIDE, SODIUM LACTATE AND CALCIUM CHLORIDE 9 ML/HR: 600; 310; 30; 20 INJECTION, SOLUTION INTRAVENOUS at 07:33

## 2023-12-28 RX ADMIN — Medication 100 MCG: at 11:26

## 2023-12-28 RX ADMIN — PROPOFOL 150 MG: 10 INJECTION, EMULSION INTRAVENOUS at 11:03

## 2023-12-28 RX ADMIN — TRANEXAMIC ACID 1000 MG: 100 INJECTION INTRAVENOUS at 11:10

## 2023-12-28 NOTE — BRIEF OP NOTE
SHOULDER ARTHROSCOPY WITH ROTATOR CUFF REPAIR  Progress Note    Samina Marin  12/28/2023    Pre-op Diagnosis:   Left rotator cuff tear, SLAP tear, subacromial impingement, AC joint arthritis      Post-Op Diagnosis Codes:  Same    Procedure/CPT® Codes:        Procedure(s):  LEFT SHOULDER ARTHROSCOPY WITH ROTATOR CUFF REPAIR BICEP TENODESIS EXTENSIVE DEBRIDEMENT ACROMIOPLASTY DISTIAL CLAVICAL EXCISION        SURGICAL APPROACH: Single Row        Surgeon(s):  Yosvany Stock MD    Anesthesia: General with Block    Staff:   Circulator: Renetta Laughlin RN; Sunita Spicer RN  Scrub Person: Aditya Guallpa  Vendor Representative: Onesimo Cisneros  Assistant: Rico Chery IV, CSFA  Assistant: Rico Chery IV, CSFA      Estimated Blood Loss: minimal    Urine Voided: * No values recorded between 12/28/2023 10:56 AM and 12/28/2023 12:50 PM *    Specimens:                None          Drains: * No LDAs found *    Findings: See dictated op report        Complications: None apparent time of dictation    Assistant: Rico Chery IV, CSFA  was responsible for performing the following activities: Retraction, Suction, Irrigation, Suturing, Closing, and Placing Dressing and their skilled assistance was necessary for the success of this case.    Yosvany Stock MD     Date: 12/28/2023  Time: 12:56 EST

## 2023-12-28 NOTE — ANESTHESIA PROCEDURE NOTES
Peripheral Block    Pre-sedation assessment completed: 12/28/2023 8:00 AM    Patient reassessed immediately prior to procedure    Patient location during procedure: pre-op  Start time: 12/28/2023 8:10 AM  Stop time: 12/28/2023 8:15 AM  Reason for block: at surgeon's request and post-op pain management  Performed by  Anesthesiologist: Manuelito Edouard MD  Preanesthetic Checklist  Completed: patient identified, IV checked, site marked, risks and benefits discussed, surgical consent, monitors and equipment checked, pre-op evaluation and timeout performed  Prep:  Pt Position: supine  Sterile barriers:cap, gloves and mask  Prep: ChloraPrep  Patient monitoring: blood pressure monitoring, continuous pulse oximetry and EKG  Procedure    Sedation: yes  Performed under: local infiltration  Guidance:ultrasound guided    ULTRASOUND INTERPRETATION.  Using ultrasound guidance a 21 G gauge needle was placed in close proximity to the nerve, at which point, under ultrasound guidance anesthetic was injected in the area of the nerve and spread of the anesthesia was seen on ultrasound in close proximity thereto.  There were no abnormalities seen on ultrasound; a digital image was taken; and the patient tolerated the procedure with no complications. Images:still images obtained, printed/placed on chart    Laterality:left  Block Type:interscalene  Injection Technique:single-shot  Needle Type:echogenic and Tuohy  Needle Gauge:21 G  Resistance on Injection: none    Medications Used: dexamethasone (DECADRON) injection - Injection   4 mg - 12/28/2023 8:10:00 AM  ropivacaine (NAROPIN) 0.5 % injection - Injection   20 mL - 12/28/2023 8:10:00 AM      Post Assessment  Injection Assessment: negative aspiration for heme, no paresthesia on injection and incremental injection  Patient Tolerance:comfortable throughout block  Complications:no  Additional Notes  Ultrasound guidance used to visualize nerve anatomy, guide needle placement and verify  local anesthetic disbursement.

## 2023-12-28 NOTE — H&P
"    Orthopaedic & Sports Medicine Surgery  Dr. Yosvany Stock MD  [phone]       History & Physical       Patient: Samina Marin    Proposed Surgery and date: Procedure(s) (LRB):  LEFT SHOULDER ARTHROSCOPY WITH ROTATOR CUFF REPAIR BICEP TENODESIS EXTENSIVE DEBRIDEMENT ACROMIOPLASTY DISTIAL CLAVICAL EXCISION (Left)     YOB: 1950    Medical Record Number: [id number]  Wt Readings from Last 3 Encounters:   12/27/23 98.9 kg (218 lb)   09/27/23 98.9 kg (218 lb)   08/02/23 99.3 kg (219 lb)     Ht Readings from Last 3 Encounters:   12/27/23 162.6 cm (64\")   09/27/23 165.1 cm (65\")   08/02/23 165.1 cm (65\")     There is no height or weight on file to calculate BMI.  No height and weight on file for this encounter.      Surgeon:  Dr. Yosvany Stock M.D.        Chief Complaints: left shoulder pain    History of Present Illness: 73 y.o. female presents withleft rotator cuff tear, slap tear, AC arthritis.     Allergies:   Allergies   Allergen Reactions    Meperidine Rash       Medications:   Home Medications:  No current facility-administered medications on file prior to encounter.     Current Outpatient Medications on File Prior to Encounter   Medication Sig    amLODIPine (NORVASC) 10 MG tablet Take 1 tablet by mouth Every Morning.    aspirin 81 MG chewable tablet Chew 1 tablet Daily.    atorvastatin (LIPITOR) 40 MG tablet Take 1 tablet by mouth Every Night.    cetirizine (zyrTEC) 10 MG tablet Take 1 tablet by mouth Daily. (Patient taking differently: Take 1 tablet by mouth Daily As Needed.)    cyanocobalamin ([employer] Vitamin B-12) 1000 MCG tablet Take 1 tablet by mouth Daily. (Patient taking differently: Take 1 tablet by mouth Every Morning.)    diclofenac (VOLTAREN) 50 MG EC tablet Take 1 tablet by mouth Every Morning.    DULoxetine (CYMBALTA) 60 MG capsule TAKE 1 CAPSULE BY MOUTH DAILY (Patient taking differently: Take 1 capsule by mouth Every Morning.)    esomeprazole (nexIUM) 40 MG capsule Take 1 capsule by mouth " Every Morning Before Breakfast.    ibuprofen (ADVIL,MOTRIN) 800 MG tablet     metoprolol tartrate (LOPRESSOR) 25 MG tablet Take 1 tablet by mouth 2 (Two) Times a Day.    Riboflavin-Magnesium-Feverfew (MigreLief) 200-180-50 MG tablet Take 1 tablet by mouth 2 (Two) Times a Day.    tolterodine (DETROL) 2 MG tablet Take 1 tablet by mouth 2 (Two) Times a Day.     Current Medications:  Scheduled Meds:  Continuous Infusions:No current facility-administered medications for this encounter.    PRN Meds:.    Past Medical History:   Diagnosis Date    Allergic rhinitis     Anxiety and depression     Arthritis     Chronic bronchitis     Concussion 2023    Eczema     face & scalp    GERD (gastroesophageal reflux disease)     Hypertension     Perianal abscess     Sinus bradycardia     Stroke 2023    dx by [facility]  ? date    Urinary incontinence     wears pads        Past Surgical History:   Procedure Laterality Date    BACK SURGERY      lumbar    CARDIAC CATHETERIZATION N/A 12/09/2021    Procedure: Left Heart Cath;  Surgeon: Marily Maya MD;  [facility];  Service: Cardiology;  Laterality: N/A;    CARDIAC CATHETERIZATION N/A 12/09/2021    Procedure: Coronary angiography;  Surgeon: Marily Maya MD;  [facility];  Service: Cardiology;  Laterality: N/A;    CARDIAC CATHETERIZATION N/A 12/09/2021    Procedure: Left ventriculography;  Surgeon: Marily Maya MD;  Location:  [facility] CATH INVASIVE LOCATION;  Service: Cardiology;  Laterality: N/A;    CARDIAC ELECTROPHYSIOLOGY STUDY AND ABLATION      CATARACT EXTRACTION EXTRACAPSULAR W/ INTRAOCULAR LENS IMPLANTATION Bilateral     COLONOSCOPY      ENDOSCOPY      EPIDURAL Right 11/07/2022    Procedure: LUMBAR/SACRAL TRANSFORAMINAL EPIDURAL RIGHT L4, L5;  Surgeon: Jonelle Abraham MD;  Location: [facility] MAIN OR;  Service: Pain Management;  Laterality: Right;    HYSTERECTOMY      MEDIAL BRANCH BLOCK Bilateral 01/04/2023    Procedure:  LUMBAR MEDIAL BRANCH BLOCK BILATERAL L3-L5 #1;  Surgeon: Jonelle Abraham MD;  Location: [facility] MAIN OR;  Service: Pain Management;  Laterality: Bilateral;    ROTATOR CUFF REPAIR Right     TENDON REPAIR Right     SHOULDER        Social History     Occupational History    Not on file   Tobacco Use    Smoking status: Never     Passive exposure: Never    Smokeless tobacco: Never    Tobacco comments:     CAFFEINE USE   Vaping Use    Vaping Use: Never used   Substance and Sexual Activity    Alcohol use: Yes     Comment: 5 drinks yearly    Drug use: Never    Sexual activity: Defer      Social History     Social History Narrative    Not on file        Family History   Problem Relation Age of Onset    Heart disease Sister     Ovarian cancer Sister     Liver cancer Sister     Hypertension Sister     Pancreatic cancer Sister     Heart disease Brother     Malig Hyperthermia Neg Hx          Review of Systems: 14 point review of systems was performed and was negative except as documented in the history of present illness.      Physical Exam: 73 y.o. female    Vital signs reviewed.    General Appearance:    Alert, cooperative, in no acute distress                  General: alert, oriented  Eyes: conjunctiva clear  ENT: external ears and nose atraumatic  CV: no peripheral edema  Resp: normal respiratory effort  Skin: no rashes or wounds; normal turgor  Psych: mood and affect appropriate  Lymph: no nodes appreciated  Neuro: gross sensation intact  Vascular:  Palpable peripheral pulse in noted extremity  Musculoskeletal Extremities:  tenderness over operative extremity. Moves all extremities well, no to minimal LE edema, no cyanosis, no redness            Diagnostic Tests:  Lab Results (last 7 days)       ** No results found for the last 168 hours. **            Radiology images/reports reviewed and as indicated previously in the note.       Assessment: Left rotator cuff tear, SLAP tear, AC arthritis    Plan:  Left shoulder  arthroscopy, rotator cuff repair, biceps tenodesis, distal clavicle excision, debridement, acromioplasty. We will plan on proceeding with surgery at patient's request. Dr. Stock has reviewed details of procedure with patient today and discussed risks, benefits, alternatives, and limitations of the procedure in laymen's terms with the risks including but not limited to:  neurovascular damage, bleeding, infection, chronic pain, worsening of pain, chondrolysis, recurrent problem,  swelling, loss of motion, weakness, stiffness, instability, DVT, pulmonary embolus, death, stroke, complex regional pain syndrome, and need for additional procedures.  No guarantees were given regarding results of surgery.     Patient was given the opportunity to ask and have all questions answered today.  The patient voiced understanding of the risks, benefits, and alternative forms of treatment that were discussed and the patient consents to proceed with surgery.     Discharge Plan: Home with f/u in with Dr. Stock  Date: 12/28/2023    Yosvany Stock MD  [facility]  Orthopaedic Surgery, Sports Medicine  [phone]

## 2023-12-28 NOTE — ANESTHESIA POSTPROCEDURE EVALUATION
Patient: Samina Marin    Procedure Summary       Date: 12/28/23 Room / Location: [facility] OR [address] [place] [address] [facility]    Anesthesia Start: 1055 Anesthesia Stop: 1256    Procedure: LEFT SHOULDER ARTHROSCOPY WITH ROTATOR CUFF REPAIR BICEP TENODESIS EXTENSIVE DEBRIDEMENT ACROMIOPLASTY DISTIAL CLAVICAL EXCISION (Left: Shoulder) Diagnosis:     Surgeons: Yosvany Stock MD Provider: Manuelito Edouard MD    Anesthesia Type: general with block ASA Status: 3            Anesthesia Type: general with block    Vitals  Vitals Value Taken Time   /67 12/28/23 1300   Temp 36.4 °C (97.5 °F) 12/28/23 1253   Pulse 85 12/28/23 1312   Resp 16 12/28/23 1300   SpO2 91 % 12/28/23 1312   Vitals shown include unfiled device data.        Post Anesthesia Care and Evaluation    Patient location during evaluation: bedside  Patient participation: complete - patient participated  Level of consciousness: awake and alert  Pain management: adequate    Airway patency: patent  Anesthetic complications: No anesthetic complications  PONV Status: controlled  Cardiovascular status: blood pressure returned to baseline and acceptable  Respiratory status: acceptable  Hydration status: acceptable

## 2023-12-28 NOTE — ANESTHESIA PROCEDURE NOTES
Airway  Urgency: elective    Date/Time: 12/28/2023 11:06 AM  Airway not difficult    General Information and Staff    Patient location during procedure: OR  Anesthesiologist: Manuelito Edouard MD  CRNA/CAA: Minnie Horvath CRNA    Indications and Patient Condition  Indications for airway management: airway protection    Preoxygenated: yes  MILS maintained throughout  Mask difficulty assessment: 1 - vent by mask    Final Airway Details  Final airway type: endotracheal airway      Successful airway: ETT  Cuffed: yes   Successful intubation technique: direct laryngoscopy  Facilitating devices/methods: anterior pressure/BURP and intubating stylet  Endotracheal tube insertion site: oral  Blade: Bess  Blade size: 2  ETT size (mm): 7.0  Cormack-Lehane Classification: grade I - full view of glottis  Placement verified by: chest auscultation and capnometry   Cuff volume (mL): 8  Measured from: lips  ETT/EBT  to lips (cm): 21  Number of attempts at approach: 1  Assessment: lips, teeth, and gum same as pre-op and atraumatic intubation    Additional Comments  Pt preoxygenated, SIVI, bag mask vent, ATETI, dentition as before

## 2023-12-28 NOTE — OP NOTE
Orthopaedic & Sports Medicine Surgery  Dr. Yosvany Stock MD  [phone]    Operative Note    PATIENT NAME: Samina Marin  MRN: [id number]  : 1950 AGE: 73 y.o. GENDER: female  DATE OF OPERATION: 2023  PREOPERATIVE DIAGNOSIS: left rotator cuff tear, biceps tendinitis/SLAP tear, circumferential labral fraying, synovitis/subacromial bursitis, subacromial impingement, AC joint arthritis  POSTOPERATIVE DIAGNOSIS: Same  OPERATION PERFORMED:   -Rotator cuff repair, left ,  with 1 anchor upper border subscap repair and 1 anchor supraspinatus repair  -Biceps tenodesis   -Extensive Debridement (anterior inferior posterior labrum, rotator interval intra-articular synovitis, subacromial bursitis)  -Arthroscopic distal clavicle excision  -Subacromial Decompression with Acromioplasty   Well  SURGEON: Yosvany Stock MD  Circulator: Renetta Laughlin RN; Sunita Spicer RN  Scrub Person: Aditya Guallpa  Vendor Representative: Onesimo Cisneros  Assistant: Rico Chery IV, CSFA  ANESTHESIA: General with Block  ASSISTANT: Rico Chery CFA laterally an assistant was necessary and used to help with reduction, instrumentation, retracting, positioning, suture passing, camera positioning/holding, closing. The case would not have been possible without their skilled assistance.   ESTIMATED BLOOD LOSS: Minimal   ANTIBIOTICS: Ancef  SPONGE AND NEEDLE COUNT: Correct  COMPONENTS:   Arthrex 4.75 mm knotless biocomposite SwiveLock x 1 for the biceps tenodesis and subscap repair  Arthrex 5.5 mm knotless biocomposite SwiveLock x 1 for the supraspinatus repair    INDICATIONS: The patient was found to have a rotator cuff tear and the above mentioned findings. They failed conservative treatment and elected to undergo operative treatment. The risks, benefits, and alternatives to surgery were discussed with the patient and they include but are not limited to:  DVT, PE, heart attack, stroke, death, nerve or blood vessel injury,  scarring, bleeding, infection, loss of function, implant failure, complications with anesthetics, stiffness, frozen shoulder, iatrogenic fracture, re-injury/re-tear, cosmetic difference, incomplete resolution of symptoms, extended recovery, post-operative pain and immobilization, continued pain, and need for repeat surgery. The patient explained the procedures and risks back to me in their own words, they understand, and wish to proceed.     PERTINENT FINDINGS:   -Glenoid and humeral cartilage: Grade 1 wear  -Labrum: Anterior, inferior, posterior labral fraying and partial thickness tearing   -Biceps/SLAP: There was a type 2B SLAP tear, and biceps tendinitis   -Synovitis/Bursitis: Anterior rotator interval synovitis  as well as inflamed pathologic subacromial bursitis  -Subacromial impingement: downward sloping acromion with anterolateral prominence and signs of subacromial impingement  - Subscap: Partial-thickness upper border subscap tear that was repaired with 1 link  -Supraspinatus/Infraspinatus: Full-thickness anterior supraspinatus tear  -Bone Quality: Poor to moderate      DETAILS OF PROCEDURE:  The patient was identified in the preoperative holding area.  The operative extremity was marked.  Regional block was administered without any issues.  They was then brought to the operating room on a stretcher. He was then transferred onto the operating table in the supine position, general anesthesia was performed, and then placed in the Beach Chair position. An exam under anesthesia was performed confirming the above findings. Sequential compressive devices were placed on bilateral lower extremities for DVT prophylaxis.  Bony prominences were inspected, well-padded and free of any evidence for peripheral nerve compression. Operative extremity was then prepped and draped in normal standard fashion. The operative arm was placed in Trimano arm cummings.  Prior to incision, a time-out procedure was performed confirming  the patient, site, laterality and procedure.  All were in agreement prior to proceeding forward with the incision.    A standard posterior viewing portal was established.  The scope was introduced into the joint. A standard anterior working portal was established under direct spinal needle visualization outside-in technique.  A probe was introduced into the joint and diagnostic arthroscopy was performed confirming the above findings.      Shoulder extensive debridement:  While working in the intraarticular space, degenerative circumferential unstable frayed labrum was identified. This was completed and extensively debrided with an arthroscopic shaver to remove the unstable portion to a stable of labrum.  A shaver/wand was used to debride any articular synovitis completely to a stable, non-pathologic edge.      Later in the procedure, while working in the subacromial space, extensive bursitis was encountered in the subacromial space with inflammatory changes of the rotator cuff tendons.  An arthroscopic extensive debridement was opted.  A lateral working portal was made using direct visualization with a spinal needle in an outside-in technique, and the arthroscopic shaver was used to perform extensive debridement and complete bursectomy to remove the inflamed bursal tissue from anterior to posterior and lateral to medial.      Intra-articular Biceps Tenodesis w/ SubScap Repair:-  -While in the intra-articular space, an arthroscopic biceps tenodesis and SubScap repair were opted given the previous mentioned findings, age, and activity level.  The radiofrequency device was used to release the SubScap adhesions which then had appropriate excursion to the footprint without undue tension. An anterior working cannula was placed.  Bicipital groove and lesser tuberosity footprint were prepared with an RF device and arthroscopic bur.  A Link was placed through the long head biceps with a Loop and Tack type technique using a  suture retriever/passer.  A link my was then placed through the upper SubScap. Arthroscopic scissors were used to perform a tenotomy of the biceps. The sutures were then secured in an anchor within the upper footprint of the SubScap with appropriate tension on both to perform a firm tenodesis and repair.    Subacromial Decompression with Acromioplasty:  After the scope was introduced into the subacromial space, a lateral working portal was made, and the bursectomy was performed as previously described, the radiofrequency device was used to clear soft tissues off the undersurface of the acromion. To address symptoms related to mechanical impingement, a formal acromioplasty was opted.  Arthroscopic bur was used to perform a formal acromioplasty using a cutting block technique proceeding from anterior to posterior and lateral and medial.  Thus, this completed the arthroscopic subacromial decompression with formal acromioplasty.    Rotator Cuff Repair:  -1x1 Double Row: A cannula was introduced in the lateral portal as our main working portal. With direct visualization using a spinal needle an accessory posterolateral viewing portal was created and the scope introduced. The full characteristics of the rotator cuff tear were identified and described in detail in the above findings. An arthroscopic shaver and radiofrequency device was used to clear the footprint of soft tissue and the unhealthy edges of the tendon. The arthroscopic bur was used to abrade the cortical surface to a bleeding surface for a good healing bed for the repair.  The tear was less than 1 cm and believed to be amenable for a 1 anchor repair.  So that point we placed a fiber tape in a inverted mattress type fashion through the cuff, and brought out laterally and secured into a 5.5 mm swivel lock anchor.  Had adequate bone fixation and good full-thickness repair of the supraspinatus without any dogears.      Distal Clavicle Excision:  The arthroscopic  wand was then used to clear off the soft tissue from the undersurface of the acromion, coming medially, until the AC joint was identified and cleared. The arthroscopic alton was used to perform a distal clavicle excision working inferior to superior and lateral to medial until the superior AC joint ligament was reached. We made sure to visualize that the superior aspect of the distal clavicle was fully excised and there was approximately 5-8mm of clearance between the end of the clavicle and the acromion while leaving the superior ligament/capsule intact.       Arthroscopic fluid was used to lavage the joint and then suctioned out.  The portal sites were closed with 3-0 Monocryl sutures in buried interrupted fashion.  The closure was reinforced with Steri-Strips. Standard sterile dressings were placed.  An shoulder immobilizer sling was placed.  The patient was then woken up in stable fashion and transferred back on to the hospital stretcher. There were no apparent complications, and all counts were correct at the end of the procedure. I was present and scrubbed for all critical portions of the case.    Post Operative Course:   Quicker Rotator Cuff with SubScap Repair Protocol: The patient will remain immobilized in a sling for a total of 4 weeks and then transition out from the 4-6 week eugenio. From weeks 4-6 they can come out of the sling at home, but still wear to sleep and in public until 6 weeks out from surgery. Formal PT with PROM/pendulums will be initiated at the first postoperative visit. AAROM and AROM at 6 weeks postop.  No active elbow flexion for 2 weeks. No active internal rotation or arm behind the back, and no external rotation past neutral for 4 weeks; then progress to no extremal rotation past 35 degrees until 6wks post-op for the SubScap repair. They will see me back in the office at approximately the 10-14 day eugenio.       Yosvany Stock MD  [facility]  Orthopaedic Surgery, Sports  Medicine  [phone]

## 2023-12-29 ENCOUNTER — TELEPHONE (OUTPATIENT)
Dept: NEUROLOGY | Facility: CLINIC | Age: 73
End: 2023-12-29
Payer: MEDICARE

## 2023-12-29 NOTE — TELEPHONE ENCOUNTER
Left vm, MyChart (if applicable) & mailed reminder regarding appt reschedule due to provider being out of office.

## 2024-01-02 ENCOUNTER — APPOINTMENT (OUTPATIENT)
Dept: SPEECH THERAPY | Facility: HOSPITAL | Age: 74
End: 2024-01-02
Payer: OTHER MISCELLANEOUS

## 2024-01-09 ENCOUNTER — APPOINTMENT (OUTPATIENT)
Dept: SPEECH THERAPY | Facility: HOSPITAL | Age: 74
End: 2024-01-09
Payer: OTHER MISCELLANEOUS

## 2024-01-16 ENCOUNTER — APPOINTMENT (OUTPATIENT)
Dept: SPEECH THERAPY | Facility: HOSPITAL | Age: 74
End: 2024-01-16
Payer: OTHER MISCELLANEOUS

## 2024-01-23 ENCOUNTER — APPOINTMENT (OUTPATIENT)
Dept: SPEECH THERAPY | Facility: HOSPITAL | Age: 74
End: 2024-01-23
Payer: OTHER MISCELLANEOUS

## 2024-01-23 ENCOUNTER — HOSPITAL ENCOUNTER (OUTPATIENT)
Dept: SPEECH THERAPY | Facility: HOSPITAL | Age: 74
Setting detail: THERAPIES SERIES
Discharge: HOME | End: 2024-01-23
Payer: OTHER MISCELLANEOUS

## 2024-01-23 DIAGNOSIS — F07.81 POST CONCUSSION SYNDROME: Primary | ICD-10-CM

## 2024-01-23 DIAGNOSIS — R41.841 COGNITIVE COMMUNICATION DEFICIT: ICD-10-CM

## 2024-01-23 PROCEDURE — 97130 THER IVNTJ EA ADDL 15 MIN: CPT | Performed by: SPEECH-LANGUAGE PATHOLOGIST

## 2024-01-23 PROCEDURE — 97129 THER IVNTJ 1ST 15 MIN: CPT | Performed by: SPEECH-LANGUAGE PATHOLOGIST

## 2024-01-23 NOTE — THERAPY TREATMENT NOTE
Outpatient Speech Language Pathology   Adult Speech Language Cognitive Treatment Note  [facility]     Patient Name: Samina Marin  : 1950  MRN: [id number]  Today's Date: 2024         Visit Date: 2024   Patient Active Problem List   Diagnosis    Allergic rhinitis    Aortic valve regurgitation    Arthritis    Asthma    Carpal tunnel syndrome, bilateral    DDD (degenerative disc disease), lumbar    Esophageal reflux    Essential hypertension    Fibromyalgia    Major depressive disorder    Mixed stress and urge urinary incontinence    Mood disorder    Obesity    Paroxysmal supraventricular tachycardia    Postmenopausal    Shortness of breath    Skin cancer    Vitamin D deficiency    Angina pectoris    Lumbar radiculopathy    Trochanteric bursitis of right hip    Muscle spasm    Thoracic spine pain    Lumbar facet arthropathy    Closed head injury    Migraine with aura and without status migrainosus          Visit Dx:    ICD-10-CM ICD-9-CM   1. Post concussion syndrome  F07.81 310.2   2. Cognitive communication deficit  R41.841 799.52        OP SLP Assessment/Plan - 24 1444          SLP Assessment    Functional Problems Speech Language- Adult/Cognition  -KA    Impact on Function: Adult Speech Language/Cognition Trouble learning or remembering new information;Unable to complete specified job requirements  -KA    Clinical Impression: Speech Language-Adult/Congnition Mild:;Cognitive Communication Impairment  -KA    Prognosis Good (comment)  -KA    Patient/caregiver participated in establishment of treatment plan and goals Yes  -KA    Patient would benefit from skilled therapy intervention Yes  -KA       SLP Plan    Frequency 1x a week  -KA    Duration 4 weeks  -KA    Planned CPT's? SLP DEV COG SKILLS INITIAL (15 MIN) : 63515;SLP DEV COG SKILLS ADD (15 MIN) : 01130  -KA    Expected Duration of Therapy Session (SLP Eval) 45  -KA              User Key  (r) = Recorded By, (t) = Taken By, (c) =  Cosigned By      Initials Name Provider Type    Dilan Hinojosa, ALYSSIA Speech and Language Pathologist                                       SLP OP Goals       Row Name 01/23/24 1400          Subjective Comments    Subjective Comments Patient is pleasant and cooperative, pt had shoulder surgery recently and no showed several appointments and returned today. Patient has PT right after ST and took oxycodone as she reports she is supposed to before PT, this may have impacted performance at times pt reported to feeling a little foggy.  -KA        Subjective Pain    Able to rate subjective pain? yes  -KA     Pre-Treatment Pain Level 0  -KA     Post-Treatment Pain Level 0  -KA        Memory Goals    Status: Patient and family will implement compensatory strategies to maximize patient’s Memory function so patient can continue to participate in daily activities Progressing as expected  -KA     Comments: Patient and family will implement compensatory strategies to maximize patient’s Memory function so patient can continue to participate in daily activities Patient averaging 100% with delayed recall of three unrelated words and 100% with recall of detail from picture scene. 90% with recall of detail from voicemails  -KA     Patient’s memory skills will be enhanced as reported by patient by utilizing internal memory strategies to recall up to 3 pieces of information after a 5- minute delay 90%:;without cues  -KA     Status: Patient’s memory skills will be enhanced as reported by patient by utilizing internal memory strategies to recall up to 3 pieces of information after a 5- minute delay Progressing as expected  -KA     Comments: Patient’s memory skills will be enhanced as reported by patient by utilizing internal memory strategies to recall up to 3 pieces of information after a 5- minute delay Delayed recall of three related words after 5 and 30 minute delay 100% without cues. Immediate recall of detail in picture scene 100%  without cues. Repeating four words in correct order in memory and mental manipulation task difficulty with four words and reduced to 3 words with 80% accuracy.  -KA     Patient will demonstrate improved ability to recall information by listening to paragraph and answering yes/no questions 90%:;without cues  -KA     Status: Patient will demonstrate improved ability to recall information by listening to paragraph and answering yes/no questions Progressing as expected  -RUFINO     Comments: Patient will demonstrate improved ability to recall information by listening to paragraph and answering yes/no questions Recall of detail in voicemails (paragraph level) with multiple choice 90% without cues.  -RUFINO               User Key  (r) = Recorded By, (t) = Taken By, (c) = Cosigned By      Initials Name Provider Type    Dilan Hinojosa SLP Speech and Language Pathologist                   OP SLP Education       Row Name 01/23/24 1444       Education    Assessed Learning needs;Learning motivation  -RUFINO    Learning Motivation Strong  -RUFINO    Learning Method Explanation  -RUFINO    Teaching Response Verbalized understanding  -RUFINO              User Key  (r) = Recorded By, (t) = Taken By, (c) = Cosigned By      Initials Name Effective Dates    Dilan Hinojosa SLP 01/05/24 -                          Time Calculation:   SLP Start Time: 1345  SLP Stop Time: 1430  SLP Time Calculation (min): 45 min  Timed Charges  90076-YD Dev of Cogn Skills Initial Minutes: 15  53940-HG Dev of Cogn Skills Add Minutes: 30  Total Minutes  Timed Charges Total Minutes: 45   Total Minutes: 45    Therapy Charges for Today       Code Description Service Date Service Provider Modifiers Qty    53375842334 HC ST DEV OF COGN SKILLS INITIAL 15 MIN 1/23/2024 Dilan Sims SLP  1    34930405517 HC ST DEV OF COGN SKILLS EACH ADDT'L 15 MIN 1/23/2024 Dilna Sims SLP  2                     ALYSSIA Esquivel  1/23/2024

## 2024-01-30 ENCOUNTER — APPOINTMENT (OUTPATIENT)
Dept: SPEECH THERAPY | Facility: HOSPITAL | Age: 74
End: 2024-01-30
Payer: OTHER MISCELLANEOUS

## 2024-01-30 ENCOUNTER — HOSPITAL ENCOUNTER (OUTPATIENT)
Dept: SPEECH THERAPY | Facility: HOSPITAL | Age: 74
Setting detail: THERAPIES SERIES
Discharge: HOME | End: 2024-01-30
Payer: OTHER MISCELLANEOUS

## 2024-01-30 DIAGNOSIS — F07.81 POST CONCUSSION SYNDROME: Primary | ICD-10-CM

## 2024-01-30 DIAGNOSIS — R41.841 COGNITIVE COMMUNICATION DEFICIT: ICD-10-CM

## 2024-01-30 PROCEDURE — 96125 COGNITIVE TEST BY HC PRO: CPT | Performed by: SPEECH-LANGUAGE PATHOLOGIST

## 2024-02-02 DIAGNOSIS — F32.A DEPRESSION, UNSPECIFIED DEPRESSION TYPE: ICD-10-CM

## 2024-02-05 RX ORDER — DULOXETIN HYDROCHLORIDE 60 MG/1
60 CAPSULE, DELAYED RELEASE ORAL DAILY
Qty: 90 CAPSULE | Refills: 0 | Status: SHIPPED | OUTPATIENT
Start: 2024-02-05

## 2024-02-06 NOTE — TELEPHONE ENCOUNTER
HUB TO RELAY & SCHEDULE     2nd attempt - LMTCB to reschedule canceled 6mo f/u for additional refills

## 2024-02-07 NOTE — TELEPHONE ENCOUNTER
HUB TO RELAY & SCHEDULE      3rd attempt - LMTCB to reschedule canceled 6mo f/u for additional refills.

## 2024-02-20 ENCOUNTER — OFFICE VISIT (OUTPATIENT)
Dept: NEUROLOGY | Facility: CLINIC | Age: 74
End: 2024-02-20
Payer: MEDICARE

## 2024-02-20 VITALS
SYSTOLIC BLOOD PRESSURE: 120 MMHG | BODY MASS INDEX: 37.05 KG/M2 | WEIGHT: 217 LBS | OXYGEN SATURATION: 97 % | HEART RATE: 76 BPM | DIASTOLIC BLOOD PRESSURE: 70 MMHG | HEIGHT: 64 IN

## 2024-02-20 DIAGNOSIS — F07.81 POST CONCUSSION SYNDROME: ICD-10-CM

## 2024-02-20 DIAGNOSIS — Z86.73 HISTORY OF STROKE: ICD-10-CM

## 2024-02-20 DIAGNOSIS — R41.3 MEMORY LOSS: Primary | ICD-10-CM

## 2024-02-20 PROBLEM — M25.512 PAIN IN JOINT OF LEFT SHOULDER: Status: ACTIVE | Noted: 2023-10-18

## 2024-02-20 PROCEDURE — 1160F RVW MEDS BY RX/DR IN RCRD: CPT | Performed by: NURSE PRACTITIONER

## 2024-02-20 PROCEDURE — 99215 OFFICE O/P EST HI 40 MIN: CPT | Performed by: NURSE PRACTITIONER

## 2024-02-20 PROCEDURE — 3078F DIAST BP <80 MM HG: CPT | Performed by: NURSE PRACTITIONER

## 2024-02-20 PROCEDURE — 1159F MED LIST DOCD IN RCRD: CPT | Performed by: NURSE PRACTITIONER

## 2024-02-20 PROCEDURE — 3074F SYST BP LT 130 MM HG: CPT | Performed by: NURSE PRACTITIONER

## 2024-02-20 RX ORDER — ATORVASTATIN CALCIUM 40 MG/1
40 TABLET, FILM COATED ORAL NIGHTLY
Qty: 90 TABLET | Refills: 3 | Status: SHIPPED | OUTPATIENT
Start: 2024-02-20

## 2024-02-20 NOTE — PROGRESS NOTES
CC: Follow-up for history of stroke, postconcussive syndrome and memory loss    HPI:  Samina Marin is a  73 y.o.  right-handed female with hypertension, SVT status post ablation 2014, history of migraine with aura, prior stroke found on imaging, and depression who is being seen follow-up today for postconcussive syndrome and memory loss as well as history of stroke.  She is unaccompanied.  She was last seen by me in September 2023.    Please see my last note for details on her hospital admission in July 2023 at which time it is determined that she had a chronic right basal ganglia stroke and findings of minimal fibromuscular dysplasia involving bilateral vertebral arteries.  She was started on aspirin 81 mg daily and Lipitor 40 mg daily.    Last visit she was reporting worsening balance problems, persistent headache, and memory loss since a fall with head injury that had occurred in July 2023.  She scored 20 out of 30 on the MoCA in September 2023; she scored 25 out of 30 on the MoCA today.  In July 2023 TSH was 1.86, In September 2023 B12 was low normal at 385, folate was 11.5, and RPR was nonreactive.  She was started on oral B12 replacement and B12 level was rechecked November 30, 2023 and was 675.    Today she reports overall she is doing a little better.  She does note she will frequently lose her train of thought when in conversations.  Her headaches have since resolved on their own; she is not taking any over-the-counter medications for her headaches.  Balance has improved but she states it is still an issue and she notes she bumps into walls but has not had any recent falls.  She is driving in her car but notes she does get a little anxious at times but has not had any issues getting lost or having any accidents.    She underwent a left shoulder surgery in December 2023.  She has 2 months more of physical therapy.      Past Medical History:   Diagnosis Date    Allergic rhinitis     Anxiety and depression      Arthritis     Chronic bronchitis     Concussion 2023    Eczema     face & scalp    GERD (gastroesophageal reflux disease)     Hypertension     Perianal abscess     Sinus bradycardia     Stroke 2023    dx by scns  ? date    Urinary incontinence     wears pads         Past Surgical History:   Procedure Laterality Date    BACK SURGERY      lumbar    CARDIAC CATHETERIZATION N/A 12/09/2021    Procedure: Left Heart Cath;  Surgeon: Marily Maya MD;  Location:  [facility];  Service: Cardiology;  Laterality: N/A;    CARDIAC CATHETERIZATION N/A 12/09/2021    Procedure: Coronary angiography;  Surgeon: Marily Maya MD;  Location:  [facility];  Service: Cardiology;  Laterality: N/A;    CARDIAC CATHETERIZATION N/A 12/09/2021    Procedure: Left ventriculography;  Surgeon: Marily Maya MD;  Location:  [facility];  Service: Cardiology;  Laterality: N/A;    CARDIAC ELECTROPHYSIOLOGY STUDY AND ABLATION      CATARACT EXTRACTION EXTRACAPSULAR W/ INTRAOCULAR LENS IMPLANTATION Bilateral     COLONOSCOPY      ENDOSCOPY      EPIDURAL Right 11/07/2022    Procedure: LUMBAR/SACRAL TRANSFORAMINAL EPIDURAL RIGHT L4, L5;  Surgeon: Jonelle Abraham MD;  Location: [facility] OR;  Service: Pain Management;  Laterality: Right;    HYSTERECTOMY      MEDIAL BRANCH BLOCK Bilateral 01/04/2023    Procedure: LUMBAR MEDIAL BRANCH BLOCK BILATERAL L3-L5 #1;  Surgeon: Jonelle Abraham MD;  Location: [facility] MAIN OR;  Service: Pain Management;  Laterality: Bilateral;    ROTATOR CUFF REPAIR Right     SHOULDER ARTHROSCOPY W/ ROTATOR CUFF REPAIR Left 12/28/2023    Procedure: LEFT SHOULDER ARTHROSCOPY WITH ROTATOR CUFF REPAIR BICEP TENODESIS EXTENSIVE DEBRIDEMENT ACROMIOPLASTY DISTIAL CLAVICAL EXCISION;  Surgeon: Yosvany Stock MD;  Location: [facility];  Service: Orthopedics;  Laterality: Left;    TENDON REPAIR Right     SHOULDER           Current Outpatient Medications:     acetaminophen (TYLENOL)  500 MG tablet, Take 2 tablets by mouth Every 8 (Eight) Hours for 60 days., Disp: 180 tablet, Rfl: 1    amLODIPine (NORVASC) 10 MG tablet, Take 1 tablet by mouth Every Morning., Disp: , Rfl:     aspirin 81 MG chewable tablet, Chew 1 tablet Daily., Disp: , Rfl:     atorvastatin (LIPITOR) 40 MG tablet, Take 1 tablet by mouth Every Night., Disp: 90 tablet, Rfl: 3    cetirizine (zyrTEC) 10 MG tablet, Take 1 tablet by mouth Daily. (Patient taking differently: Take 1 tablet by mouth Daily As Needed.), Disp: 30 tablet, Rfl: 0    cyanocobalamin ([employer] Vitamin B-12) 1000 MCG tablet, Take 1 tablet by mouth Daily. (Patient taking differently: Take 1 tablet by mouth Every Morning.), Disp: 100 tablet, Rfl: 2    diclofenac (VOLTAREN) 50 MG EC tablet, Take 1 tablet by mouth Every Morning., Disp: , Rfl:     DULoxetine (CYMBALTA) 60 MG capsule, TAKE 1 CAPSULE BY MOUTH DAILY, Disp: 90 capsule, Rfl: 0    esomeprazole (nexIUM) 40 MG capsule, Take 1 capsule by mouth Every Morning Before Breakfast., Disp: , Rfl:     ibuprofen (ADVIL,MOTRIN) 800 MG tablet, , Disp: , Rfl:     metoprolol tartrate (LOPRESSOR) 25 MG tablet, Take 1 tablet by mouth 2 (Two) Times a Day., Disp: 60 tablet, Rfl: 0    tolterodine (DETROL) 2 MG tablet, Take 1 tablet by mouth 2 (Two) Times a Day., Disp: 60 tablet, Rfl: 0    ondansetron (ZOFRAN) 8 MG tablet, Take 1 tablet by mouth Every 8 (Eight) Hours As Needed for Nausea or Vomiting. (Patient not taking: Reported on 2/20/2024), Disp: 30 tablet, Rfl: 0    oxyCODONE (ROXICODONE) 5 MG immediate release tablet, Take 1-2 tablets by mouth Every 4 (Four) Hours As Needed for Moderate Pain. (Patient not taking: Reported on 2/20/2024), Disp: 30 tablet, Rfl: 0      Family History   Problem Relation Age of Onset    Heart disease Sister     Ovarian cancer Sister     Liver cancer Sister     Hypertension Sister     Pancreatic cancer Sister     Heart disease Brother     Malig Hyperthermia Neg Hx          Social History  "    Socioeconomic History    Marital status:    Tobacco Use    Smoking status: Never     Passive exposure: Never    Smokeless tobacco: Never    Tobacco comments:     CAFFEINE USE   Vaping Use    Vaping Use: Never used   Substance and Sexual Activity    Alcohol use: Yes     Comment: 5 drinks yearly    Drug use: Never    Sexual activity: Defer         Allergies   Allergen Reactions    Meperidine Rash         Pain Scale:        ROS:  Review of Systems   Musculoskeletal:  Positive for gait problem (balance problem wuth >2 falls since LOV).   Psychiatric/Behavioral:  Positive for confusion and decreased concentration.        ROS completed by MA reviewed by me and agree.      Physical Exam:  Vitals:    02/20/24 0958   BP: 120/70   Pulse: 76   SpO2: 97%   Weight: 98.4 kg (217 lb)   Height: 162.6 cm (64\")     Orthostatic BP:    Body mass index is 37.25 kg/m².    General appearance: Well developed, well nourished, well groomed, alert and cooperative.   HEENT: Normocephalic.   Cardiac: Regular rate and rhythm. No murmurs.   Chest Exam: Clear to auscultation bilaterally, no wheezes, no rhonchi.  Extremities: Normal, no edema.   Skin: No rashes or birthmarks.     Higher integrative function: Oriented to 6/6 on the MoCA. Intact recent and remote memory, attention span, concentration and language. Spontaneous speech, fund of vocabulary are normal. Scored 25/30 on the MoCA.   CN II: VF intact.   CN III IV VI: Extraocular movements are full without nystagmus. Pupils are equal, round, and reactive to light.   CN V: Normal facial sensation.  CN VII: Facial movements are symmetric, no weakness.   CN VIII: Auditory acuity is normal.   CN IX & X: Symmetric palatal movement.   CN XI: Sternocleidomastoid and trapezius are normal. No weakness.   CN XII: The tongue is midline. No atrophy or fasciculations.   Motor: Normal muscle strength, bulk, and tone in upper and lower extremities except LUE with limited shoulder ROM. No " fasciculations, rigidity, spasticity or abnormal movements.   Sensation: Normal light touch in all extremities.   Station and gait: Normal gait and station.   Coordination: Finger to nose test showed no dysmetria.       Results:      Lab Results   Component Value Date    GLUCOSE 113 (H) 11/30/2023    BUN 12 11/30/2023    CREATININE 0.92 11/30/2023    EGFRIFNONA 95 12/07/2021    BCR 13.0 11/30/2023    CO2 28.4 11/30/2023    CALCIUM 9.1 11/30/2023    ALBUMIN 4.0 11/30/2023    LABIL2 1.2 (L) 03/22/2021    AST 14 11/30/2023    ALT 7 11/30/2023       Lab Results   Component Value Date    WBC 5.59 11/30/2023    HGB 13.8 11/30/2023    HCT 40.6 11/30/2023    MCV 84.4 11/30/2023     11/30/2023         .  Lab Results   Component Value Date    RPR Non-Reactive 09/27/2023         Lab Results   Component Value Date    TSH 1.860 07/19/2023         Lab Results   Component Value Date    [id number] 675 11/30/2023         Lab Results   Component Value Date    FOLATE 11.50 09/27/2023         Lab Results   Component Value Date    HGBA1C 5.40 07/19/2023         Lab Results   Component Value Date    GLUCOSE 113 (H) 11/30/2023    BUN 12 11/30/2023    CREATININE 0.92 11/30/2023    EGFRIFNONA 95 12/07/2021    BCR 13.0 11/30/2023    K 3.8 11/30/2023    CO2 28.4 11/30/2023    CALCIUM 9.1 11/30/2023    ALBUMIN 4.0 11/30/2023    LABIL2 1.2 (L) 03/22/2021    AST 14 11/30/2023    ALT 7 11/30/2023         Lab Results   Component Value Date    WBC 5.59 11/30/2023    HGB 13.8 11/30/2023    HCT 40.6 11/30/2023    MCV 84.4 11/30/2023     11/30/2023             Assessment/Plan:        Diagnoses and all orders for this visit:    1. Memory loss (Primary)  -     Ambulatory Referral to Neuropsychology    2. Post concussion syndrome    3. History of stroke    Other orders  -     atorvastatin (LIPITOR) 40 MG tablet; Take 1 tablet by mouth Every Night.  Dispense: 90 tablet; Refill: 3    For Memory loss- this is improving. I suspect related to  post concussive syndrome. She would like to pursue further testing with neuropsychological evaluation, will refer to [facility]. Will wait on results before releasing her back to work.      Continue oral B12 replacement.  I've recommended CBT for anxiety, she will let me know if she decides to pursue this.     For stroke prevention:    Continue asa 81 mg daily  Blood pressure control to <130/80   Goal LDL <70-recommend high dose statins-Continue Lipitor 40mg    Serum glucose < 140   Call 911 for stroke any stroke symptoms     Follow-up 6 mo after neuropsychological evaluation  Total time >40 min                  Dictated utilizing Dragon dictation.

## 2024-02-20 NOTE — PATIENT INSTRUCTIONS
Call to schedule cognitive testing with [facility].     [address] [address]    [phone]  Fall Prevention in the Home, Adult  Falls can cause injuries and affect people of all ages. There are many simple things that you can do to make your home safe and to help prevent falls.  If you need it, ask for help making these changes.  What actions can I take to prevent falls?  General information  Use good lighting in all rooms. Make sure to:  Replace any light bulbs that burn out.  Turn on lights if it is dark and use night-lights.  Keep items that you use often in easy-to-reach places. Lower the shelves around your home if needed.  Move furniture so that there are clear paths around it.  Do not keep throw rugs or other things on the floor that can make you trip.  If any of your floors are uneven, fix them.  Add color or contrast paint or tape to clearly eugenio and help you see:  Grab bars or handrails.  First and last steps of staircases.  Where the edge of each step is.  If you use a ladder or stepladder:  Make sure that it is fully opened. Do not climb a closed ladder.  Make sure the sides of the ladder are locked in place.  Have someone hold the ladder while you use it.  Know where your pets are as you move through your home.  What can I do in the bathroom?         Keep the floor dry. Clean up any water that is on the floor right away.  Remove soap buildup in the bathtub or shower. Buildup makes bathtubs and showers slippery.  Use non-skid mats or decals on the floor of the bathtub or shower.  Attach bath mats securely with double-sided, non-slip rug tape.  If you need to sit down while you are in the shower, use a non-slip stool.  Install grab bars by the toilet and in the bathtub and shower. Do not use towel bars as grab bars.  What can I do in the bedroom?  Make sure that you have a light by your bed that is easy to reach.  Do not use any sheets or blankets on your bed that hang to the  floor.  Have a firm bench or chair with side arms that you can use for support when you get dressed.  What can I do in the kitchen?  Clean up any spills right away.  If you need to reach something above you, use a sturdy step stool that has a grab bar.  Keep electrical cables out of the way.  Do not use floor polish or wax that makes floors slippery.  What can I do with my stairs?  Do not leave anything on the stairs.  Make sure that you have a light switch at the top and the bottom of the stairs. Have them installed if you do not have them.  Make sure that there are handrails on both sides of the stairs. Fix handrails that are broken or loose. Make sure that handrails are as long as the staircases.  Install non-slip stair treads on all stairs in your home if they do not have carpet.  Avoid having throw rugs at the top or bottom of stairs, or secure the rugs with carpet tape to prevent them from moving.  Choose a carpet design that does not hide the edge of steps on the stairs. Make sure that carpet is firmly attached to the stairs. Fix any carpet that is loose or worn.  What can I do on the outside of my home?  Use bright outdoor lighting.  Repair the edges of walkways and driveways and fix any cracks. Clear paths of anything that can make you trip, such as tools or rocks.  Add color or contrast paint or tape to clearly eugenio and help you see high doorway thresholds.  Trim any bushes or trees on the main path into your home.  Check that handrails are securely fastened and in good repair. Both sides of all steps should have handrails.  Install guardrails along the edges of any raised decks or porches.  Have leaves, snow, and ice cleared regularly. Use sand, salt, or ice melt on walkways during winter months if you live where there is ice and snow.  In the garage, clean up any spills right away, including grease or oil spills.  What other actions can I take?  Review your medicines with your health care provider. Some  medicines can make you confused or feel dizzy. This can increase your chance of falling.  Wear closed-toe shoes that fit well and support your feet. Wear shoes that have rubber soles and low heels.  Use a cane, walker, scooter, or crutches that help you move around if needed.  Talk with your provider about other ways that you can decrease your risk of falls. This may include seeing a physical therapist to learn to do exercises to improve movement and strength.  Where to find more information  Centers for Disease Control and Prevention, [place]: cdc.gov  National [place] on Aging: rain.nih.gov  National [place] on Aging: rain.nih.gov  Contact a health care provider if:  You are afraid of falling at home.  You feel weak, drowsy, or dizzy at home.  You fall at home.  Get help right away if you:  Lose consciousness or have trouble moving after a fall.  Have a fall that causes a head injury.  These symptoms may be an emergency. Get help right away. Call 911.  Do not wait to see if the symptoms will go away.  Do not drive yourself to the hospital.  This information is not intended to replace advice given to you by your health care provider. Make sure you discuss any questions you have with your health care provider.  Document Revised: 08/21/2023 Document Reviewed: 08/21/2023  Elsevier Patient Education © 2023 [employer]

## 2024-02-20 NOTE — LETTER
February 20, 2024       No Recipients    Patient: Samina Marin   YOB: 1950   Date of Visit: 2/20/2024     Dear Samina Marin:       Thank you for referring Samina Marin to me for evaluation. Below are the relevant portions of my assessment and plan of care.    If you have questions, please do not hesitate to call me. I look forward to following Samina along with you.         Sincerely,        HORTENSIA Paul        CC:   No Recipients    Elle Pedro APRN  02/20/24 1219  Sign when Signing Visit  CC: Follow-up for history of stroke, postconcussive syndrome and memory loss    HPI:  Samina Marin is a  73 y.o.  right-handed female with hypertension, SVT status post ablation 2014, history of migraine with aura, prior stroke found on imaging, and depression who is being seen follow-up today for postconcussive syndrome and memory loss as well as history of stroke.  She is unaccompanied.  She was last seen by me in September 2023.    Please see my last note for details on her hospital admission in July 2023 at which time it is determined that she had a chronic right basal ganglia stroke and findings of minimal fibromuscular dysplasia involving bilateral vertebral arteries.  She was started on aspirin 81 mg daily and Lipitor 40 mg daily.    Last visit she was reporting worsening balance problems, persistent headache, and memory loss since a fall with head injury that had occurred in July 2023.  She scored 20 out of 30 on the MoCA in September 2023; she scored 25 out of 30 on the MoCA today.  In July 2023 TSH was 1.86, In September 2023 B12 was low normal at 385, folate was 11.5, and RPR was nonreactive.  She was started on oral B12 replacement and B12 level was rechecked November 30, 2023 and was 675.    Today she reports overall she is doing a little better.  She does note she will frequently lose her train of thought when in conversations.  Her headaches have since resolved on their own;  she is not taking any over-the-counter medications for her headaches.  Balance has improved but she states it is still an issue and she notes she bumps into walls but has not had any recent falls.  She is driving in her car but notes she does get a little anxious at times but has not had any issues getting lost or having any accidents.    She underwent a left shoulder surgery in December 2023.  She has 2 months more of physical therapy.      Past Medical History:   Diagnosis Date   • Allergic rhinitis    • Anxiety and depression    • Arthritis    • Chronic bronchitis    • Concussion 2023   • Eczema     face & scalp   • GERD (gastroesophageal reflux disease)    • Hypertension    • Perianal abscess    • Sinus bradycardia    • Stroke 2023    dx by scns  ? date   • Urinary incontinence     wears pads         Past Surgical History:   Procedure Laterality Date   • BACK SURGERY      lumbar   • CARDIAC CATHETERIZATION N/A 12/09/2021    Procedure: Left Heart Cath;  Surgeon: Marily Maya MD;  Location: [facility] INVASIVE LOCATION;  Service: Cardiology;  Laterality: N/A;   • CARDIAC CATHETERIZATION N/A 12/09/2021    Procedure: Coronary angiography;  Surgeon: Marily Maya MD;  Location: [facility] INVASIVE LOCATION;  Service: Cardiology;  Laterality: N/A;   • CARDIAC CATHETERIZATION N/A 12/09/2021    Procedure: Left ventriculography;  Surgeon: Marily Maya MD;  Location: [facility] INVASIVE LOCATION;  Service: Cardiology;  Laterality: N/A;   • CARDIAC ELECTROPHYSIOLOGY STUDY AND ABLATION     • CATARACT EXTRACTION EXTRACAPSULAR W/ INTRAOCULAR LENS IMPLANTATION Bilateral    • COLONOSCOPY     • ENDOSCOPY     • EPIDURAL Right 11/07/2022    Procedure: LUMBAR/SACRAL TRANSFORAMINAL EPIDURAL RIGHT L4, L5;  Surgeon: Jonelle Abraham MD;  Location: [facility] OR;  Service: Pain Management;  Laterality: Right;   • HYSTERECTOMY     • MEDIAL BRANCH BLOCK Bilateral 01/04/2023    Procedure: LUMBAR MEDIAL BRANCH BLOCK BILATERAL  L3-L5 #1;  Surgeon: Jonelle Abraham MD;  [place] [facility] [place];  Service: Pain Management;  Laterality: Bilateral;   • ROTATOR CUFF REPAIR Right    • SHOULDER ARTHROSCOPY W/ ROTATOR CUFF REPAIR Left 12/28/2023    Procedure: LEFT SHOULDER ARTHROSCOPY WITH ROTATOR CUFF REPAIR BICEP TENODESIS EXTENSIVE DEBRIDEMENT ACROMIOPLASTY DISTIAL CLAVICAL EXCISION;  Surgeon: Yosvany Stock MD;  [facility] [place]  Service: Orthopedics;  Laterality: Left;   • TENDON REPAIR Right     SHOULDER           Current Outpatient Medications:   •  acetaminophen (TYLENOL) 500 MG tablet, Take 2 tablets by mouth Every 8 (Eight) Hours for 60 days., Disp: 180 tablet, Rfl: 1  •  amLODIPine (NORVASC) 10 MG tablet, Take 1 tablet by mouth Every Morning., Disp: , Rfl:   •  aspirin 81 MG chewable tablet, Chew 1 tablet Daily., Disp: , Rfl:   •  atorvastatin (LIPITOR) 40 MG tablet, Take 1 tablet by mouth Every Night., Disp: 90 tablet, Rfl: 3  •  cetirizine (zyrTEC) 10 MG tablet, Take 1 tablet by mouth Daily. (Patient taking differently: Take 1 tablet by mouth Daily As Needed.), Disp: 30 tablet, Rfl: 0  •  cyanocobalamin ([employer] Vitamin B-12) 1000 MCG tablet, Take 1 tablet by mouth Daily. (Patient taking differently: Take 1 tablet by mouth Every Morning.), Disp: 100 tablet, Rfl: 2  •  diclofenac (VOLTAREN) 50 MG EC tablet, Take 1 tablet by mouth Every Morning., Disp: , Rfl:   •  DULoxetine (CYMBALTA) 60 MG capsule, TAKE 1 CAPSULE BY MOUTH DAILY, Disp: 90 capsule, Rfl: 0  •  esomeprazole (nexIUM) 40 MG capsule, Take 1 capsule by mouth Every Morning Before Breakfast., Disp: , Rfl:   •  ibuprofen (ADVIL,MOTRIN) 800 MG tablet, , Disp: , Rfl:   •  metoprolol tartrate (LOPRESSOR) 25 MG tablet, Take 1 tablet by mouth 2 (Two) Times a Day., Disp: 60 tablet, Rfl: 0  •  tolterodine (DETROL) 2 MG tablet, Take 1 tablet by mouth 2 (Two) Times a Day., Disp: 60 tablet, Rfl: 0  •  ondansetron (ZOFRAN) 8 MG tablet, Take 1 tablet by mouth Every 8 (Eight) Hours As Needed  "for Nausea or Vomiting. (Patient not taking: Reported on 2/20/2024), Disp: 30 tablet, Rfl: 0  •  oxyCODONE (ROXICODONE) 5 MG immediate release tablet, Take 1-2 tablets by mouth Every 4 (Four) Hours As Needed for Moderate Pain. (Patient not taking: Reported on 2/20/2024), Disp: 30 tablet, Rfl: 0      Family History   Problem Relation Age of Onset   • Heart disease Sister    • Ovarian cancer Sister    • Liver cancer Sister    • Hypertension Sister    • Pancreatic cancer Sister    • Heart disease Brother    • Malig Hyperthermia Neg Hx          Social History     Socioeconomic History   • Marital status:    Tobacco Use   • Smoking status: Never     Passive exposure: Never   • Smokeless tobacco: Never   • Tobacco comments:     CAFFEINE USE   Vaping Use   • Vaping Use: Never used   Substance and Sexual Activity   • Alcohol use: Yes     Comment: 5 drinks yearly   • Drug use: Never   • Sexual activity: Defer         Allergies   Allergen Reactions   • Meperidine Rash         Pain Scale:        ROS:  Review of Systems   Musculoskeletal:  Positive for gait problem (balance problem wuth >2 falls since LOV).   Psychiatric/Behavioral:  Positive for confusion and decreased concentration.        ROS completed by MA reviewed by me and agree.      Physical Exam:  Vitals:    02/20/24 0958   BP: 120/70   Pulse: 76   SpO2: 97%   Weight: 98.4 kg (217 lb)   Height: 162.6 cm (64\")     Orthostatic BP:    Body mass index is 37.25 kg/m².    General appearance: Well developed, well nourished, well groomed, alert and cooperative.   HEENT: Normocephalic.   Cardiac: Regular rate and rhythm. No murmurs.   Chest Exam: Clear to auscultation bilaterally, no wheezes, no rhonchi.  Extremities: Normal, no edema.   Skin: No rashes or birthmarks.     Higher integrative function: Oriented to 6/6 on the MoCA. Intact recent and remote memory, attention span, concentration and language. Spontaneous speech, fund of vocabulary are normal. Scored 25/30 on " the MoCA.   CN II: VF intact.   CN III IV VI: Extraocular movements are full without nystagmus. Pupils are equal, round, and reactive to light.   CN V: Normal facial sensation.  CN VII: Facial movements are symmetric, no weakness.   CN VIII: Auditory acuity is normal.   CN IX & X: Symmetric palatal movement.   CN XI: Sternocleidomastoid and trapezius are normal. No weakness.   CN XII: The tongue is midline. No atrophy or fasciculations.   Motor: Normal muscle strength, bulk, and tone in upper and lower extremities except LUE with limited shoulder ROM. No fasciculations, rigidity, spasticity or abnormal movements.   Sensation: Normal light touch in all extremities.   Station and gait: Normal gait and station.   Coordination: Finger to nose test showed no dysmetria.       Results:      Lab Results   Component Value Date    GLUCOSE 113 (H) 11/30/2023    BUN 12 11/30/2023    CREATININE 0.92 11/30/2023    EGFRIFNONA 95 12/07/2021    BCR 13.0 11/30/2023    CO2 28.4 11/30/2023    CALCIUM 9.1 11/30/2023    ALBUMIN 4.0 11/30/2023    LABIL2 1.2 (L) 03/22/2021    AST 14 11/30/2023    ALT 7 11/30/2023       Lab Results   Component Value Date    WBC 5.59 11/30/2023    HGB 13.8 11/30/2023    HCT 40.6 11/30/2023    MCV 84.4 11/30/2023     11/30/2023         .  Lab Results   Component Value Date    RPR Non-Reactive 09/27/2023         Lab Results   Component Value Date    TSH 1.860 07/19/2023         Lab Results   Component Value Date    RUOHFJGN93 675 11/30/2023         Lab Results   Component Value Date    FOLATE 11.50 09/27/2023         Lab Results   Component Value Date    HGBA1C 5.40 07/19/2023         Lab Results   Component Value Date    GLUCOSE 113 (H) 11/30/2023    BUN 12 11/30/2023    CREATININE 0.92 11/30/2023    EGFRIFNONA 95 12/07/2021    BCR 13.0 11/30/2023    K 3.8 11/30/2023    CO2 28.4 11/30/2023    CALCIUM 9.1 11/30/2023    ALBUMIN 4.0 11/30/2023    LABIL2 1.2 (L) 03/22/2021    AST 14 11/30/2023    ALT 7  11/30/2023         Lab Results   Component Value Date    WBC 5.59 11/30/2023    HGB 13.8 11/30/2023    HCT 40.6 11/30/2023    MCV 84.4 11/30/2023     11/30/2023             Assessment/Plan:        Diagnoses and all orders for this visit:    1. Memory loss (Primary)  -     Ambulatory Referral to Neuropsychology    2. Post concussion syndrome    3. History of stroke    Other orders  -     atorvastatin (LIPITOR) 40 MG tablet; Take 1 tablet by mouth Every Night.  Dispense: 90 tablet; Refill: 3    For Memory loss- this is improving. I suspect related to post concussive syndrome. She would like to pursue further testing with neuropsychological evaluation, will refer to [facility]. Will wait on results before releasing her back to work.      Continue oral B12 replacement.  I've recommended CBT for anxiety, she will let me know if she decides to pursue this.     For stroke prevention:    Continue asa 81 mg daily  Blood pressure control to <130/80   Goal LDL <70-recommend high dose statins-Continue Lipitor 40mg    Serum glucose < 140   Call 911 for stroke any stroke symptoms     Follow-up 6 mo after neuropsychological evaluation  Total time >40 min                  Dictated utilizing Dragon dictation.

## 2024-03-14 ENCOUNTER — TELEPHONE (OUTPATIENT)
Dept: NEUROLOGY | Facility: CLINIC | Age: 74
End: 2024-03-14

## 2024-03-14 NOTE — TELEPHONE ENCOUNTER
----- Message from John Lindsey MD sent at 1/13/2023  4:12 PM CST -----  Pediatric urology nurse, please call mom and indicate that the official report is normal terms of kidneys and bladder on the ultrasound.  I reviewed all the images.  I do not see any concerns.  The child does have a very large bladder, and she empties well.  My best advice is to try not to over react to the child complaining of pain up in there when she voids.  If this persists, asked mom to call us back, we would consider cystourethroscopy under general anesthesia.  Thank you.   PATIENT CALLING TO ADVISE:    TINGLING ALL OVER BODY - STARTED LAST COUPLE OF DAYS - SLEEPING MORE THAN NORMAL SHE STATES HER VISION/PERSPECTIVE IS OFF - THINGS THAT ARE SMALLER SUDDENLY LOOK LARGER.    ISSUES AFTER HER ACCIDENT - STUTTERING, SHORT-TERM FORGETTING  ARE COMING BACK  TO HOW IT WAS BEFORE THERAPY    PLEASE ADVISE PATIENT

## 2024-03-14 NOTE — TELEPHONE ENCOUNTER
Pt states she has had an increase in stressors.   Pt states the vision issues are new but have resolved.  No medications have been d/c  Pt finished speech therapy x1mo     Pt would like to know if her last office note was faxed to her workmans comp lady. She states she gave you her info at her last ov.

## 2024-03-18 ENCOUNTER — TELEPHONE (OUTPATIENT)
Dept: NEUROLOGY | Facility: CLINIC | Age: 74
End: 2024-03-18

## 2024-03-18 NOTE — TELEPHONE ENCOUNTER
Provider: SIDNEY ARMSTRONG APRN    Caller: BHAVYA    Relationship to Patient: [employer]AR [employer] WITH WORKMAN'S COMP     Phone Number: [phone]     Reason for Call: CALLING REGARDING PATIENT BEING OFF WORK BY PROVIDER IN FEBRUARY 2024 FOR WORKMAN'S COMP D/T FALL IN JULY 13, 2023.  STATED THE OV NOTES FROM 9-27-24 DOES NOT STATE ANYTHING REGARDING A WORKMAN'S COMP CLAIM.  STATED THIS HAPPENED AT THE END OF JULY 2023 WHILE OUT OF STATE FOR HER JOB.  STATED SHE FELL AND SLIPPED IN THE SHOWER.  STATED PATIENT IS STILL NOT WORKING. DOES CLINIC HAVE ANY OTHER OV NOTES PERTAINING TO THE FALL FOR WORKMAN'S COMP?      STATED FAX NUMBER [phone]    When was the patient last seen: 2-20-24    PLEASE CALL & ADVISE

## 2024-04-01 ENCOUNTER — TELEPHONE (OUTPATIENT)
Dept: NEUROLOGY | Facility: CLINIC | Age: 74
End: 2024-04-01

## 2024-04-01 NOTE — TELEPHONE ENCOUNTER
Provider: SIDNEY ARMSTRONG APRN    Caller: HEMALATHA    Relationship to Patient:   SELF    Phone Number: [phone]    Reason for Call: CALLING TO S/W PROVIDER.   STATED SHE WANTS TO KNOW WHAT SHE SENT WORKMAN'S COMP.  STATED IT IS NOT WHAT PROVIDER TOLD HER SHE WAS GOING TO SEND.    PLEASE CALL & ADVISE.

## 2024-04-05 DIAGNOSIS — F32.A DEPRESSION, UNSPECIFIED DEPRESSION TYPE: ICD-10-CM

## 2024-04-08 RX ORDER — DULOXETIN HYDROCHLORIDE 60 MG/1
60 CAPSULE, DELAYED RELEASE ORAL DAILY
Qty: 90 CAPSULE | Refills: 3 | OUTPATIENT
Start: 2024-04-08

## 2024-06-03 DIAGNOSIS — N32.81 OVERACTIVE BLADDER: ICD-10-CM

## 2024-06-03 RX ORDER — TOLTERODINE TARTRATE 2 MG/1
2 TABLET, EXTENDED RELEASE ORAL 2 TIMES DAILY
Qty: 60 TABLET | Refills: 0 | Status: SHIPPED | OUTPATIENT
Start: 2024-06-03 | End: 2024-06-05 | Stop reason: SDUPTHER

## 2024-06-03 NOTE — TELEPHONE ENCOUNTER
Patient's last appt was 8/2/23. Patient stopped seeing Jew due to [employer] insurance. She has an appt on 6/11/24 @ 1:15 pm. She is requesting a refill of tolterodine (DETROL) 2 MG tablet  before her appt.

## 2024-06-05 DIAGNOSIS — N32.81 OVERACTIVE BLADDER: ICD-10-CM

## 2024-06-05 RX ORDER — TOLTERODINE TARTRATE 2 MG/1
2 TABLET, EXTENDED RELEASE ORAL 2 TIMES DAILY
Qty: 60 TABLET | Refills: 0 | Status: SHIPPED | OUTPATIENT
Start: 2024-06-05

## 2024-06-05 NOTE — TELEPHONE ENCOUNTER
Caller: Samina Marin    Relationship: Self    Best call back number: [phone]     Requested Prescriptions:   Requested Prescriptions     Pending Prescriptions Disp Refills    tolterodine (DETROL) 2 MG tablet 60 tablet 0     Sig: Take 1 tablet by mouth 2 (Two) Times a Day.        Pharmacy where request should be sent: [id number] - [place], [place] - [address] AT [facility] [address] - [phone] [address] [phone] FX     Last office visit with prescribing clinician: 8/2/2023   Last telemedicine visit with prescribing clinician: Visit date not found   Next office visit with prescribing clinician: 6/11/2024     Does the patient have less than a 3 day supply:  [x] Yes  [] No    Would you like a call back once the refill request has been completed: [x] Yes [] No    If the office needs to give you a call back, can they leave a voicemail: [x] Yes [] No    Tavon Spann Rep   06/05/24 12:49 EDT

## 2024-06-11 ENCOUNTER — OFFICE VISIT (OUTPATIENT)
Dept: FAMILY MEDICINE CLINIC | Facility: CLINIC | Age: 74
End: 2024-06-11
Payer: MEDICARE

## 2024-06-11 VITALS
HEART RATE: 77 BPM | OXYGEN SATURATION: 98 % | DIASTOLIC BLOOD PRESSURE: 49 MMHG | BODY MASS INDEX: 36.37 KG/M2 | WEIGHT: 213 LBS | HEIGHT: 64 IN | SYSTOLIC BLOOD PRESSURE: 99 MMHG

## 2024-06-11 DIAGNOSIS — E53.8 B12 DEFICIENCY: ICD-10-CM

## 2024-06-11 DIAGNOSIS — Z78.0 MENOPAUSE: ICD-10-CM

## 2024-06-11 DIAGNOSIS — M25.551 RIGHT HIP PAIN: ICD-10-CM

## 2024-06-11 DIAGNOSIS — Z13.820 SCREENING FOR OSTEOPOROSIS: Primary | ICD-10-CM

## 2024-06-11 DIAGNOSIS — I10 PRIMARY HYPERTENSION: ICD-10-CM

## 2024-06-11 DIAGNOSIS — E78.5 HYPERLIPIDEMIA, UNSPECIFIED HYPERLIPIDEMIA TYPE: ICD-10-CM

## 2024-06-11 DIAGNOSIS — Z23 NEED FOR PNEUMOCOCCAL VACCINE: ICD-10-CM

## 2024-06-11 DIAGNOSIS — F32.A DEPRESSION, UNSPECIFIED DEPRESSION TYPE: ICD-10-CM

## 2024-06-11 DIAGNOSIS — K21.9 GASTROESOPHAGEAL REFLUX DISEASE WITHOUT ESOPHAGITIS: ICD-10-CM

## 2024-06-11 DIAGNOSIS — Z13.29 SCREENING FOR THYROID DISORDER: ICD-10-CM

## 2024-06-11 DIAGNOSIS — Z12.31 ENCOUNTER FOR SCREENING MAMMOGRAM FOR MALIGNANT NEOPLASM OF BREAST: ICD-10-CM

## 2024-06-11 DIAGNOSIS — N39.46 MIXED STRESS AND URGE URINARY INCONTINENCE: ICD-10-CM

## 2024-06-11 PROCEDURE — 90677 PCV20 VACCINE IM: CPT | Performed by: NURSE PRACTITIONER

## 2024-06-11 PROCEDURE — 1125F AMNT PAIN NOTED PAIN PRSNT: CPT | Performed by: NURSE PRACTITIONER

## 2024-06-11 PROCEDURE — 3074F SYST BP LT 130 MM HG: CPT | Performed by: NURSE PRACTITIONER

## 2024-06-11 PROCEDURE — 1159F MED LIST DOCD IN RCRD: CPT | Performed by: NURSE PRACTITIONER

## 2024-06-11 PROCEDURE — G0009 ADMIN PNEUMOCOCCAL VACCINE: HCPCS | Performed by: NURSE PRACTITIONER

## 2024-06-11 PROCEDURE — 1160F RVW MEDS BY RX/DR IN RCRD: CPT | Performed by: NURSE PRACTITIONER

## 2024-06-11 PROCEDURE — 99214 OFFICE O/P EST MOD 30 MIN: CPT | Performed by: NURSE PRACTITIONER

## 2024-06-11 PROCEDURE — 3078F DIAST BP <80 MM HG: CPT | Performed by: NURSE PRACTITIONER

## 2024-06-11 RX ORDER — TOLTERODINE TARTRATE 2 MG/1
2 TABLET, EXTENDED RELEASE ORAL 2 TIMES DAILY
Qty: 180 TABLET | Refills: 1 | Status: SHIPPED | OUTPATIENT
Start: 2024-06-11

## 2024-06-11 RX ORDER — DULOXETIN HYDROCHLORIDE 60 MG/1
60 CAPSULE, DELAYED RELEASE ORAL DAILY
Qty: 90 CAPSULE | Refills: 1 | Status: SHIPPED | OUTPATIENT
Start: 2024-06-11

## 2024-06-11 NOTE — PROGRESS NOTES
Chief Complaint  Hypertension, Hyperlipidemia, Depression, Heartburn, OAB, and Hip Pain    Subjective            Samina Marin presents to [facility]  History of Present Illness  Pt is a f/u for OAB, HTN, HLD, GERD, and depression. Pt has c/o (R) hip pain. Pt states this has been ongoing for 3 yrs. Pt states she is having a hard time going up steps and walking. Pt states her hip will give out at times. Pt has not had any recent imaging.     Pt has handicap parking form to be signed.     Pt is due labs/orders placed.    Pt is due mammogram 9/27/24/orders placed.    Pt is due dexa/orders placed.     Pt is due PCV20 vaccine. Pt will get today.         Past Medical History:   Diagnosis Date    Allergic rhinitis     Anxiety and depression     Arthritis     Chronic bronchitis     Concussion 2023    Eczema     face & scalp    GERD (gastroesophageal reflux disease)     Hypertension     Perianal abscess     Sinus bradycardia     Stroke 2023    dx by scns  ? date    Urinary incontinence     wears pads       Allergies   Allergen Reactions    Meperidine Rash        Past Surgical History:   Procedure Laterality Date    BACK SURGERY      lumbar    CARDIAC CATHETERIZATION N/A 12/09/2021    Procedure: Left Heart Cath;  Surgeon: Marily Maya MD;  Location:  [facility];  Service: Cardiology;  Laterality: N/A;    CARDIAC CATHETERIZATION N/A 12/09/2021    Procedure: Coronary angiography;  Surgeon: Marily Maya MD;  Location:  [facility];  Service: Cardiology;  Laterality: N/A;    CARDIAC CATHETERIZATION N/A 12/09/2021    Procedure: Left ventriculography;  Surgeon: Marily Maya MD;  Location:  [facility];  Service: Cardiology;  Laterality: N/A;    CARDIAC ELECTROPHYSIOLOGY STUDY AND ABLATION      CATARACT EXTRACTION EXTRACAPSULAR W/ INTRAOCULAR LENS IMPLANTATION Bilateral     COLONOSCOPY      ENDOSCOPY      EPIDURAL Right 11/07/2022     Procedure: LUMBAR/SACRAL TRANSFORAMINAL EPIDURAL RIGHT L4, L5;  Surgeon: Jonelle Abraham MD;  Location: [facility];  Service: Pain Management;  Laterality: Right;    HYSTERECTOMY      MEDIAL BRANCH BLOCK Bilateral 01/04/2023    Procedure: LUMBAR MEDIAL BRANCH BLOCK BILATERAL L3-L5 #1;  Surgeon: Jonelle Abraham MD;  Location: [facility];  Service: Pain Management;  Laterality: Bilateral;    ROTATOR CUFF REPAIR Right     SHOULDER ARTHROSCOPY W/ ROTATOR CUFF REPAIR Left 12/28/2023    Procedure: LEFT SHOULDER ARTHROSCOPY WITH ROTATOR CUFF REPAIR BICEP TENODESIS EXTENSIVE DEBRIDEMENT ACROMIOPLASTY DISTIAL CLAVICAL EXCISION;  Surgeon: Yosvany Stock MD;  [facility];  Service: Orthopedics;  Laterality: Left;    TENDON REPAIR Right     SHOULDER        Social History     Tobacco Use    Smoking status: Never     Passive exposure: Never    Smokeless tobacco: Never    Tobacco comments:     CAFFEINE USE   Substance Use Topics    Alcohol use: Yes     Comment: 5 drinks yearly       Family History   Problem Relation Age of Onset    Heart disease Sister     Ovarian cancer Sister     Liver cancer Sister     Hypertension Sister     Pancreatic cancer Sister     Heart disease Brother     Malig Hyperthermia Neg Hx         Current Outpatient Medications on File Prior to Visit   Medication Sig    amLODIPine (NORVASC) 10 MG tablet Take 1 tablet by mouth Every Morning.    aspirin 81 MG chewable tablet Chew 1 tablet Daily.    atorvastatin (LIPITOR) 40 MG tablet Take 1 tablet by mouth Every Night.    cetirizine (zyrTEC) 10 MG tablet Take 1 tablet by mouth Daily. (Patient taking differently: Take 1 tablet by mouth Daily As Needed.)    esomeprazole (nexIUM) 40 MG capsule Take 1 capsule by mouth Every Morning Before Breakfast.    ibuprofen (ADVIL,MOTRIN) 800 MG tablet     [DISCONTINUED] DULoxetine (CYMBALTA) 60 MG capsule TAKE 1 CAPSULE BY MOUTH DAILY    [DISCONTINUED] metoprolol tartrate (LOPRESSOR) 25 MG tablet Take 1 tablet  "by mouth 2 (Two) Times a Day.    [DISCONTINUED] tolterodine (DETROL) 2 MG tablet Take 1 tablet by mouth 2 (Two) Times a Day.    diclofenac (VOLTAREN) 50 MG EC tablet Take 1 tablet by mouth Every Morning.    [DISCONTINUED] cyanocobalamin ([employer] Vitamin B-12) 1000 MCG tablet Take 1 tablet by mouth Daily. (Patient not taking: Reported on 6/11/2024)     No current facility-administered medications on file prior to visit.       Health Maintenance Due   Topic Date Due    Pneumococcal Vaccine 65+ (1 of 2 - PCV) Never done    BMI FOLLOWUP  06/05/2024    ANNUAL WELLNESS VISIT  08/02/2024    DXA SCAN  08/25/2024       Objective     BP 99/49   Pulse 77   Ht 162.6 cm (64\")   Wt 96.6 kg (213 lb)   SpO2 98%   BMI 36.56 kg/m²       Physical Exam  Constitutional:       General: She is not in acute distress.     Appearance: Normal appearance. She is not ill-appearing.   HENT:      Head: Normocephalic and atraumatic.      Right Ear: Tympanic membrane, ear canal and external ear normal.      Left Ear: Tympanic membrane, ear canal and external ear normal.      Nose: Nose normal.   Cardiovascular:      Rate and Rhythm: Normal rate and regular rhythm.      Heart sounds: Normal heart sounds. No murmur heard.  Pulmonary:      Effort: Pulmonary effort is normal. No respiratory distress.      Breath sounds: Normal breath sounds.   Chest:      Chest wall: No tenderness.   Abdominal:      General: Abdomen is flat. Bowel sounds are normal. There is no distension.      Palpations: Abdomen is soft. There is no mass.      Tenderness: There is no abdominal tenderness. There is no guarding.   Musculoskeletal:         General: No swelling or tenderness. Normal range of motion.      Cervical back: Normal range of motion and neck supple.      Right hip: Tenderness present. No crepitus. Decreased range of motion.   Skin:     General: Skin is warm and dry.      Findings: No rash.   Neurological:      General: No focal deficit present.      Mental " Status: She is alert and oriented to person, place, and time. Mental status is at baseline.      Gait: Gait normal.   Psychiatric:         Attention and Perception: Attention normal.         Mood and Affect: Mood and affect normal.         Speech: Speech normal.         Behavior: Behavior normal. Behavior is cooperative.         Thought Content: Thought content normal. Thought content does not include suicidal ideation.         Judgment: Judgment normal.         Class 2 Severe Obesity (BMI >=35 and <=39.9). Obesity-related health conditions include the following: hypertension, dyslipidemias, and GERD. Obesity is unchanged. BMI is is above average; BMI management plan is completed. We discussed portion control and increasing exercise.        Result Review :                           Assessment and Plan        Diagnoses and all orders for this visit:    1. Screening for osteoporosis (Primary)  -     DEXA Bone Density Axial    2. Menopause  -     DEXA Bone Density Axial    3. Encounter for screening mammogram for malignant neoplasm of breast  -     Mammo Screening Digital Tomosynthesis Bilateral With CAD; Future    4. Primary hypertension  Comments:  stable  on Norvasc 10mg and Lopressor 25mg, continue  Orders:  -     CBC w AUTO Differential; Future  -     metoprolol tartrate (LOPRESSOR) 25 MG tablet; Take 1 tablet by mouth 2 (Two) Times a Day.  Dispense: 180 tablet; Refill: 1    5. Hyperlipidemia, unspecified hyperlipidemia type  Comments:  stable on Lipitor 40mg, continue  Orders:  -     Comprehensive metabolic panel; Future  -     Lipid panel; Future    6. B12 deficiency  -     Vitamin B12; Future  -     Folate; Future    7. Gastroesophageal reflux disease without esophagitis  Comments:  stable on Nexium 40mg, continue    8. Depression, unspecified depression type  Comments:  stabe on Cymbalta 60mg, cotinue  Orders:  -     DULoxetine (CYMBALTA) 60 MG capsule; Take 1 capsule by mouth Daily.  Dispense: 90 capsule;  Refill: 1    9. Mixed stress and urge urinary incontinence  Comments:  stable on Detrol 2mg, continue  Orders:  -     tolterodine (DETROL) 2 MG tablet; Take 1 tablet by mouth 2 (Two) Times a Day.  Dispense: 180 tablet; Refill: 1    10. Screening for thyroid disorder  -     TSH; Future    11. Need for pneumococcal vaccine  -     Pneumococcal Conjugate Vaccine 20-Valent (PCV20)    12. Right hip pain  -     XR Hip With or Without Pelvis 2 - 3 View Right; Future              Follow Up     Return in about 6 months (around 12/11/2024).    Patient was given instructions and counseling regarding her condition or for health maintenance advice. Please see specific information pulled into the AVS if appropriate.     Samina CARLOS Marin  reports that she has never smoked. She has never been exposed to tobacco smoke. She has never used smokeless tobacco.

## 2024-06-13 ENCOUNTER — TELEPHONE (OUTPATIENT)
Dept: NEUROLOGY | Facility: CLINIC | Age: 74
End: 2024-06-13

## 2024-06-13 NOTE — TELEPHONE ENCOUNTER
Caller: Samina Marin    Relationship: Self    Best call back number: [phone]    What form or medical record are you requesting: RETURN TO WORK LETTER    Who is requesting this form or medical record from you: EMPLOYER    How would you like to receive the form or medical records (pick-up, mail, fax):  OR MYCHART    Timeframe paperwork needed: ASAP    Additional notes: PATIENT CANNOT LIFT LUGGAGE BUT CAN OTHERWISE RETURN TO WORK WITH NO OTHER RESTRICTIONS.

## 2024-06-14 NOTE — TELEPHONE ENCOUNTER
(Elle Pedro pt).  She is requesting a letter to return to work. She also reports that she was not able to get the referred neurophych testing, due to her having outstanding debt to pay @ this time.  Provider is out of office until week after next.  Patient says her workman's comp was not approved.  I advised pt that she may need to be seen in our office for a determination  letter to return to work.  Please review  Thank you

## 2024-06-26 NOTE — TELEPHONE ENCOUNTER
Caller: Samina Marin    Relationship: Self    Best call back number:   Telephone Information:   Mobile [phone]         Who are you requesting to speak with (clinical staff, provider,  specific staff member): CLINICAL    What was the call regarding: CALLING TO CHECK ON STATUS, PT WAS TRYING TO RETURN BACK TO WORK WITHIN THE NEXT TWO WEEKS.    IF IT CAN BE FAXED TO HER EMPLOYER : [phone] TIFFANIE MONET

## 2024-06-27 NOTE — PROGRESS NOTES
CC: f/u history of stroke and post concussive syndrome with associated memory loss    HPI:  Samina Marin is a  73 y.o.  right-handed female with hypertension, SVT status post ablation 2014, history of migraine with aura, prior stroke found on imaging, and depression who is being seen in follow-up today for postconcussive syndrome with associated memory loss.  She is unaccompanied.  She was last seen by me in February 2024.    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  She was started on aspirin 81 mg daily and Lipitor 40 mg daily.    Following her fall in July 2023 she had worsening balance problems, more persistent headache, and complaints of memory loss.  She scored 20 out of 30 on the MoCA in September 2023; she scored 25 out of 30 on the MoCA in February 2024. Today she scored 20 out of 30 on the MoCA. In July 2023 TSH was 1.86, In September 2023 B12 was low normal at 385, folate was 11.5, and RPR was nonreactive.  She was started on oral B12 replacement and B12 level was rechecked November 30, 2023 and was 675.  Last visit due to ongoing complaints and referred her for cognitive testing with [facility] but patient states she was unable to complete this due to financial issues.    She resumed having interpersonal cars and has not had any issues, but has not gone back to work.  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    The above history was reviewed and updated.   Past Medical History:   Diagnosis Date    Allergic rhinitis     Anxiety and depression     Arthritis     Chronic bronchitis     Concussion 2023    Eczema      face & scalp    GERD (gastroesophageal reflux disease)     Hypertension     Perianal abscess     Sinus bradycardia     Stroke 2023    dx by scns  ? date    Urinary incontinence     wears pads         Past Surgical History:   Procedure Laterality Date    BACK SURGERY      lumbar    CARDIAC CATHETERIZATION N/A 12/09/2021    Procedure: Left Heart Cath;  Surgeon: Marily Maya MD;  Location:  [facility] C[facility]VASIVE [facility];  Service: Cardiology;  Laterality: N/A;    CARDIAC CATHETERIZATION N/A 12/09/2021    Procedure: Coronary angiography;  Surgeon: Marily Maya MD;  Location:  [facility]AS[facility];  Service: Cardiology;  Laterality: N/A;    CARDIAC CATHETERIZATION N/A 12/09/2021    Procedure: Left ventriculography;  Surgeon: Marily Maay MD;  Location:  [facility] INVASIVE [facility]ATION;  Service: Cardiology;  Laterality: N/A;    CARDIAC ELECTROPHYSIOLOGY STUDY AND ABLATION      CATARACT EXTRACTION EXTRACAPSULAR W/ INTRAOCULAR LENS IMPLANTATION Bilateral     COLONOSCOPY      ENDOSCOPY      EPIDURAL Right 11/07/2022    Procedure: LUMBAR/SACRAL TRANSFORAMINAL EPIDURAL RIGHT L4, L5;  Surgeon: Jonelle Abraham MD;  [place] [facility] [place];  Service: Pain Management;  Laterality: Right;    HYSTERECTOMY      MEDIAL BRANCH BLOCK Bilateral 01/04/2023    Procedure: LUMBAR MEDIAL BRANCH BLOCK BILATERAL L3-L5 #1;  Surgeon: Jonelle Abraham MD;  Location: [facility] [place] [facility] [place];  Service: Pain Management;  Laterality: Bilateral;    ROTATOR CUFF REPAIR Right     SHOULDER ARTHROSCOPY W/ ROTATOR CUFF REPAIR Left 12/28/2023    Procedure: LEFT SHOULDER ARTHROSCOPY WITH ROTATOR CUFF REPAIR BICEP TENODESIS EXTENSIVE DEBRIDEMENT ACROMIOPLASTY DISTIAL CLAVICAL EXCISION;  Surgeon: Yosvany Stock MD;  Location: [facility];  Service: Orthopedics;  Laterality: Left;    TENDON REPAIR Right     SHOULDER           Current Outpatient Medications:     amLODIPine (NORVASC) 10 MG tablet, Take 1 tablet by mouth Every Morning., Disp: , Rfl:      "aspirin 81 MG chewable tablet, Chew 1 tablet Daily., Disp: , Rfl:     atorvastatin (LIPITOR) 40 MG tablet, Take 1 tablet by mouth Every Night., Disp: 90 tablet, Rfl: 3    cetirizine (zyrTEC) 10 MG tablet, Take 1 tablet by mouth Daily. (Patient taking differently: Take 1 tablet by mouth Daily As Needed.), Disp: 30 tablet, Rfl: 0    diclofenac (VOLTAREN) 50 MG EC tablet, Take 1 tablet by mouth Every Morning., Disp: , Rfl:     DULoxetine (CYMBALTA) 60 MG capsule, Take 1 capsule by mouth Daily., Disp: 90 capsule, Rfl: 1    esomeprazole (nexIUM) 40 MG capsule, Take 1 capsule by mouth Every Morning Before Breakfast., Disp: , Rfl:     ibuprofen (ADVIL,MOTRIN) 800 MG tablet, , Disp: , Rfl:     metoprolol tartrate (LOPRESSOR) 25 MG tablet, Take 1 tablet by mouth 2 (Two) Times a Day., Disp: 180 tablet, Rfl: 1    tolterodine (DETROL) 2 MG tablet, Take 1 tablet by mouth 2 (Two) Times a Day., Disp: 180 tablet, Rfl: 1      Family History   Problem Relation Age of Onset    Heart disease Sister     Ovarian cancer Sister     Liver cancer Sister     Hypertension Sister     Pancreatic cancer Sister     Heart disease Brother     Malig Hyperthermia Neg Hx          Social History     Socioeconomic History    Marital status:    Tobacco Use    Smoking status: Never     Passive exposure: Never    Smokeless tobacco: Never    Tobacco comments:     CAFFEINE USE   Vaping Use    Vaping status: Never Used   Substance and Sexual Activity    Alcohol use: Yes     Comment: 5 drinks yearly    Drug use: Never    Sexual activity: Defer         Allergies   Allergen Reactions    Meperidine Rash           Physical Exam:  Vitals:    07/03/24 0837   BP: 112/70   Pulse: 80   SpO2: 97%   Weight: 97.3 kg (214 lb 9.6 oz)   Height: 162.6 cm (64\")     Orthostatic BP:    Body mass index is 36.84 kg/m².    General appearance: Well developed, well nourished, well groomed, alert and cooperative.   HEENT: Normocephalic.   Cardiac: Regular rate and rhythm. No " murmurs.   Chest Exam: Clear to auscultation bilaterally, no wheezes, no rhonchi.  Extremities: Normal, no edema.   Skin: No rashes or birthmarks.      Higher integrative function: Oriented 6 out of 6 on the MoCA.  Intact recent remote memory, attention span, concentration and language.  Speech fluent.  Scored 20 out of 30 on the MoCA.  CN II: VF intact.   CN III IV VI: Extraocular movements are full without nystagmus. Pupils are equal, round, and reactive to light.   CN V: Normal facial sensation.  CN VII: Facial movements are symmetric, no weakness.   CN VIII: Auditory acuity is normal.   CN IX & X: Symmetric palatal movement.   CN XI: Sternocleidomastoid and trapezius are normal. No weakness.   CN XII: The tongue is midline  Motor: Normal muscle strength, bulk, and tone in upper and lower extremities. No fasciculations, rigidity, spasticity or abnormal movements.   Sensation: Normal light touch in all extremities.   Station and gait: Normal gait and station.   Coordination: Finger to nose test showed no dysmetria.   The patient was reexamined, changes noted.   Results:      Lab Results   Component Value Date    GLUCOSE 113 (H) 11/30/2023    BUN 12 11/30/2023    CREATININE 0.92 11/30/2023    EGFRIFNONA 95 12/07/2021    BCR 13.0 11/30/2023    CO2 28.4 11/30/2023    CALCIUM 9.1 11/30/2023    ALBUMIN 4.0 11/30/2023    LABIL2 1.2 (L) 03/22/2021    AST 14 11/30/2023    ALT 7 11/30/2023       Lab Results   Component Value Date    WBC 5.59 11/30/2023    HGB 13.8 11/30/2023    HCT 40.6 11/30/2023    MCV 84.4 11/30/2023     11/30/2023         .  Lab Results   Component Value Date    RPR Non-Reactive 09/27/2023         Lab Results   Component Value Date    TSH 1.860 07/19/2023         Lab Results   Component Value Date    [id number] 675 11/30/2023         Lab Results   Component Value Date    FOLATE 11.50 09/27/2023         Lab Results   Component Value Date    HGBA1C 5.40 07/19/2023         Lab Results   Component  Value Date    GLUCOSE 113 (H) 11/30/2023    BUN 12 11/30/2023    CREATININE 0.92 11/30/2023    EGFRIFNONA 95 12/07/2021    BCR 13.0 11/30/2023    K 3.8 11/30/2023    CO2 28.4 11/30/2023    CALCIUM 9.1 11/30/2023    ALBUMIN 4.0 11/30/2023    LABIL2 1.2 (L) 03/22/2021    AST 14 11/30/2023    ALT 7 11/30/2023         Lab Results   Component Value Date    WBC 5.59 11/30/2023    HGB 13.8 11/30/2023    HCT 40.6 11/30/2023    MCV 84.4 11/30/2023     11/30/2023             Assessment/Plan:       Diagnoses and all orders for this visit:    1. Memory loss (Primary)    2. History of stroke        She is reporting improvement in postconcussive symptoms and wants to return to driving.  She feels her memory is back to baseline but scored 20 out of 30 on the [place].  I offered OT driving evaluation but she declined this for now, wants to have her driving company evaluate her and also states she has a DOT physical coming up.  She is going to have her work send me their evaluation will decide if further evaluation is needed here prior to releasing her back to work.    For history of stroke:   Continue aspirin 81 mg daily   Blood pressure control to <130/80   Goal LDL <70-recommend high dose statins-continue Lipitor 40 mg    Serum glucose < 140   Call 911 for stroke any stroke symptoms    Will plan 6-month follow-up for now but may need to see her sooner depending on the above    Total time: Greater than 30 minutes      Dictated utilizing Dragon dictation.

## 2024-07-03 ENCOUNTER — OFFICE VISIT (OUTPATIENT)
Dept: NEUROLOGY | Facility: CLINIC | Age: 74
End: 2024-07-03
Payer: MEDICARE

## 2024-07-03 VITALS
DIASTOLIC BLOOD PRESSURE: 70 MMHG | BODY MASS INDEX: 36.64 KG/M2 | HEART RATE: 80 BPM | OXYGEN SATURATION: 97 % | SYSTOLIC BLOOD PRESSURE: 112 MMHG | HEIGHT: 64 IN | WEIGHT: 214.6 LBS

## 2024-07-03 DIAGNOSIS — R41.3 MEMORY LOSS: Primary | ICD-10-CM

## 2024-07-03 DIAGNOSIS — Z86.73 HISTORY OF STROKE: ICD-10-CM

## 2024-07-03 PROCEDURE — 3078F DIAST BP <80 MM HG: CPT | Performed by: NURSE PRACTITIONER

## 2024-07-03 PROCEDURE — 1159F MED LIST DOCD IN RCRD: CPT | Performed by: NURSE PRACTITIONER

## 2024-07-03 PROCEDURE — 3074F SYST BP LT 130 MM HG: CPT | Performed by: NURSE PRACTITIONER

## 2024-07-03 PROCEDURE — 99214 OFFICE O/P EST MOD 30 MIN: CPT | Performed by: NURSE PRACTITIONER

## 2024-07-03 PROCEDURE — 1160F RVW MEDS BY RX/DR IN RCRD: CPT | Performed by: NURSE PRACTITIONER

## 2024-07-03 NOTE — TELEPHONE ENCOUNTER
PATIENT STATED A REFERRAL IS REQUIRED BY THE PLACE SHE HAS TO COMPLETE HER EVALUATION AT. PATIENT DOES NOT REMEMBER THE NAME OF THE PLACE.

## 2024-07-03 NOTE — LETTER
July 3, 2024       No Recipients    Patient: Samina Marin   YOB: 1950   Date of Visit: 7/3/2024     Dear HORTENSIA Schmitt:       Thank you for referring Samina Marin to me for evaluation. Below are the relevant portions of my assessment and plan of care.    If you have questions, please do not hesitate to call me. I look forward to following Samina along with you.         Sincerely,        HORTENSIA Paul        CC:   No Recipients    Elle Pedro APRN  07/03/24 1157  Sign when Signing Visit  CC: f/u history of stroke and post concussive syndrome with associated memory loss    HPI:  Samina Marin is a  73 y.o.  right-handed female with hypertension, SVT status post ablation 2014, history of migraine with aura, prior stroke found on imaging, and depression who is being seen in follow-up today for postconcussive syndrome with associated memory loss.  She is unaccompanied.  She was last seen by me in February 2024.    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  She was started on aspirin 81 mg daily and Lipitor 40 mg daily.    Following her fall in July 2023 she had worsening balance problems, more persistent headache, and complaints of memory loss.  She scored 20 out of 30 on the MoCA in September 2023; she scored 25 out of 30 on the MoCA in February 2024. Today she scored 20 out of 30 on the MoCA. In July 2023 TSH was 1.86, In September 2023 B12 was low normal at 385, folate was 11.5, and RPR was nonreactive.  She was started on oral B12 replacement and B12 level was rechecked November 30, 2023 and was 675.  Last visit due to ongoing complaints and referred her for cognitive testing with [facility] but patient states she was unable to complete this due to  financial issues.    She resumed having interpersonal cars and has not had any issues, but has not gone back to work.  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    The above history was reviewed and updated.   Past Medical History:   Diagnosis Date   • Allergic rhinitis    • Anxiety and depression    • Arthritis    • Chronic bronchitis    • Concussion 2023   • Eczema     face & scalp   • GERD (gastroesophageal reflux disease)    • Hypertension    • Perianal abscess    • Sinus bradycardia    • Stroke 2023    dx by scns  ? date   • Urinary incontinence     wears pads         Past Surgical History:   Procedure Laterality Date   • BACK SURGERY      lumbar   • CARDIAC CATHETERIZATION N/A 12/09/2021    Procedure: Left Heart Cath;  Surgeon: Marily Maya MD;  [facility];  Service: Cardiology;  Laterality: N/A;   • CARDIAC CATHETERIZATION N/A 12/09/2021    Procedure: Coronary angiography;  Surgeon: Marliy Maya MD;  [facility];  Service: Cardiology;  Laterality: N/A;   • CARDIAC CATHETERIZATION N/A 12/09/2021    Procedure: Left ventriculography;  Surgeon: Marily Maya MD;  Location:  [facility] CATH INVASIVE LOCATION;  Service: Cardiology;  Laterality: N/A;   • CARDIAC ELECTROPHYSIOLOGY STUDY AND ABLATION     • CATARACT EXTRACTION EXTRACAPSULAR W/ INTRAOCULAR LENS IMPLANTATION Bilateral    • COLONOSCOPY     • ENDOSCOPY     • EPIDURAL Right 11/07/2022    Procedure: LUMBAR/SACRAL TRANSFORAMINAL EPIDURAL RIGHT L4, L5;  Surgeon: Jonelle Abraham MD;  [place] [facility];  Service: Pain Management;  Laterality: Right;   • HYSTERECTOMY     • MEDIAL BRANCH BLOCK Bilateral 01/04/2023    Procedure: LUMBAR MEDIAL BRANCH BLOCK BILATERAL L3-L5 #1;  Surgeon: Jonelle Abraham MD;  [place] [facility]  [place];  Service: Pain Management;  Laterality: Bilateral;   • ROTATOR CUFF REPAIR Right    • SHOULDER ARTHROSCOPY W/ ROTATOR CUFF REPAIR Left 12/28/2023    Procedure: LEFT SHOULDER ARTHROSCOPY WITH ROTATOR CUFF REPAIR BICEP TENODESIS EXTENSIVE DEBRIDEMENT ACROMIOPLASTY DISTIAL CLAVICAL EXCISION;  Surgeon: Yosvany Stock MD;  Location: [facility] [place];  Service: Orthopedics;  Laterality: Left;   • TENDON REPAIR Right     SHOULDER           Current Outpatient Medications:   •  amLODIPine (NORVASC) 10 MG tablet, Take 1 tablet by mouth Every Morning., Disp: , Rfl:   •  aspirin 81 MG chewable tablet, Chew 1 tablet Daily., Disp: , Rfl:   •  atorvastatin (LIPITOR) 40 MG tablet, Take 1 tablet by mouth Every Night., Disp: 90 tablet, Rfl: 3  •  cetirizine (zyrTEC) 10 MG tablet, Take 1 tablet by mouth Daily. (Patient taking differently: Take 1 tablet by mouth Daily As Needed.), Disp: 30 tablet, Rfl: 0  •  diclofenac (VOLTAREN) 50 MG EC tablet, Take 1 tablet by mouth Every Morning., Disp: , Rfl:   •  DULoxetine (CYMBALTA) 60 MG capsule, Take 1 capsule by mouth Daily., Disp: 90 capsule, Rfl: 1  •  esomeprazole (nexIUM) 40 MG capsule, Take 1 capsule by mouth Every Morning Before Breakfast., Disp: , Rfl:   •  ibuprofen (ADVIL,MOTRIN) 800 MG tablet, , Disp: , Rfl:   •  metoprolol tartrate (LOPRESSOR) 25 MG tablet, Take 1 tablet by mouth 2 (Two) Times a Day., Disp: 180 tablet, Rfl: 1  •  tolterodine (DETROL) 2 MG tablet, Take 1 tablet by mouth 2 (Two) Times a Day., Disp: 180 tablet, Rfl: 1      Family History   Problem Relation Age of Onset   • Heart disease Sister    • Ovarian cancer Sister    • Liver cancer Sister    • Hypertension Sister    • Pancreatic cancer Sister    • Heart disease Brother    • Malig Hyperthermia Neg Hx          Social History     Socioeconomic History   • Marital status:    Tobacco Use   • Smoking status: Never     Passive exposure: Never   • Smokeless tobacco: Never   • Tobacco comments:     CAFFEINE  "USE   Vaping Use   • Vaping status: Never Used   Substance and Sexual Activity   • Alcohol use: Yes     Comment: 5 drinks yearly   • Drug use: Never   • Sexual activity: Defer         Allergies   Allergen Reactions   • Meperidine Rash           Physical Exam:  Vitals:    07/03/24 0837   BP: 112/70   Pulse: 80   SpO2: 97%   Weight: 97.3 kg (214 lb 9.6 oz)   Height: 162.6 cm (64\")     Orthostatic BP:    Body mass index is 36.84 kg/m².    General appearance: Well developed, well nourished, well groomed, alert and cooperative.   HEENT: Normocephalic.   Cardiac: Regular rate and rhythm. No murmurs.   Chest Exam: Clear to auscultation bilaterally, no wheezes, no rhonchi.  Extremities: Normal, no edema.   Skin: No rashes or birthmarks.      Higher integrative function: Oriented 6 out of 6 on the MoCA.  Intact recent remote memory, attention span, concentration and language.  Speech fluent.  Scored 20 out of 30 on the MoCA.  CN II: VF intact.   CN III IV VI: Extraocular movements are full without nystagmus. Pupils are equal, round, and reactive to light.   CN V: Normal facial sensation.  CN VII: Facial movements are symmetric, no weakness.   CN VIII: Auditory acuity is normal.   CN IX & X: Symmetric palatal movement.   CN XI: Sternocleidomastoid and trapezius are normal. No weakness.   CN XII: The tongue is midline  Motor: Normal muscle strength, bulk, and tone in upper and lower extremities. No fasciculations, rigidity, spasticity or abnormal movements.   Sensation: Normal light touch in all extremities.   Station and gait: Normal gait and station.   Coordination: Finger to nose test showed no dysmetria.   The patient was reexamined, changes noted.   Results:      Lab Results   Component Value Date    GLUCOSE 113 (H) 11/30/2023    BUN 12 11/30/2023    CREATININE 0.92 11/30/2023    EGFRIFNONA 95 12/07/2021    BCR 13.0 11/30/2023    CO2 28.4 11/30/2023    CALCIUM 9.1 11/30/2023    ALBUMIN 4.0 11/30/2023    LABIL2 1.2 (L) " 03/22/2021    AST 14 11/30/2023    ALT 7 11/30/2023       Lab Results   Component Value Date    WBC 5.59 11/30/2023    HGB 13.8 11/30/2023    HCT 40.6 11/30/2023    MCV 84.4 11/30/2023     11/30/2023         .  Lab Results   Component Value Date    RPR Non-Reactive 09/27/2023         Lab Results   Component Value Date    TSH 1.860 07/19/2023         Lab Results   Component Value Date    [id number] 675 11/30/2023         Lab Results   Component Value Date    FOLATE 11.50 09/27/2023         Lab Results   Component Value Date    HGBA1C 5.40 07/19/2023         Lab Results   Component Value Date    GLUCOSE 113 (H) 11/30/2023    BUN 12 11/30/2023    CREATININE 0.92 11/30/2023    EGFRIFNONA 95 12/07/2021    BCR 13.0 11/30/2023    K 3.8 11/30/2023    CO2 28.4 11/30/2023    CALCIUM 9.1 11/30/2023    ALBUMIN 4.0 11/30/2023    LABIL2 1.2 (L) 03/22/2021    AST 14 11/30/2023    ALT 7 11/30/2023         Lab Results   Component Value Date    WBC 5.59 11/30/2023    HGB 13.8 11/30/2023    HCT 40.6 11/30/2023    MCV 84.4 11/30/2023     11/30/2023             Assessment/Plan:       Diagnoses and all orders for this visit:    1. Memory loss (Primary)    2. History of stroke        She is reporting improvement in postconcussive symptoms and wants to return to driving.  She feels her memory is back to baseline but scored 20 out of 30 on the [place].  I offered OT driving evaluation but she declined this for now, wants to have her driving company evaluate her and also states she has a DOT physical coming up.  She is going to have her work send me their evaluation will decide if further evaluation is needed here prior to releasing her back to work.    For history of stroke:   Continue aspirin 81 mg daily   Blood pressure control to <130/80   Goal LDL <70-recommend high dose statins-continue Lipitor 40 mg    Serum glucose < 140   Call 911 for stroke any stroke symptoms    Will plan 6-month follow-up for now but may need to see  her sooner depending on the above    Total time: Greater than 30 minutes      Dictated utilizing Dragon dictation.

## 2024-08-08 ENCOUNTER — TELEPHONE (OUTPATIENT)
Dept: NEUROLOGY | Facility: CLINIC | Age: 74
End: 2024-08-08

## 2024-08-08 NOTE — TELEPHONE ENCOUNTER
Provider: SIDNEY ARMSTRONG APRN    Caller: HEMALATHA    Relationship to Patient: SELF      Phone Number: [phone]    Reason for Call: CALLING TO SEE IF CLINIC SENT THE REFERRAL TO [facility].   STATED JACK[facility] HAS NOT RECEIVED THE REFERRAL AS OF TODAY (8-8-24).    PLEASE CALL & ADVISE

## 2024-08-13 DIAGNOSIS — R41.3 MEMORY LOSS: Primary | ICD-10-CM

## 2024-10-29 DIAGNOSIS — F32.A DEPRESSION, UNSPECIFIED DEPRESSION TYPE: ICD-10-CM

## 2024-10-30 RX ORDER — DULOXETIN HYDROCHLORIDE 60 MG/1
60 CAPSULE, DELAYED RELEASE ORAL DAILY
Qty: 90 CAPSULE | Refills: 1 | Status: SHIPPED | OUTPATIENT
Start: 2024-10-30

## 2024-12-05 ENCOUNTER — TELEPHONE (OUTPATIENT)
Dept: FAMILY MEDICINE CLINIC | Facility: CLINIC | Age: 74
End: 2024-12-05
Payer: MEDICARE

## 2024-12-06 NOTE — TELEPHONE ENCOUNTER
"Relay     \"Called pt to schedule MAW before end of year. Can go in 15 min time slot. \"                "

## 2025-01-10 NOTE — ANESTHESIA PREPROCEDURE EVALUATION
Anesthesia Evaluation     Patient summary reviewed and Nursing notes reviewed   no history of anesthetic complications:   NPO Solid Status: > 8 hours  NPO Liquid Status: > 2 hours           Airway   Mallampati: II  TM distance: >3 FB  Neck ROM: full  Dental      Pulmonary    (+) asthma,  Cardiovascular     (+) hypertension    ROS comment: Echo in 7/2023 unremarkable    Neuro/Psych  (+) CVA  GI/Hepatic/Renal/Endo    (+) obesity, GERD    Musculoskeletal     Abdominal   (+) obese   Substance History      OB/GYN          Other                          Anesthesia Plan    ASA 3     general with block     intravenous induction     Anesthetic plan, risks, benefits, and alternatives have been provided, discussed and informed consent has been obtained with: patient.        CODE STATUS:          [FreeTextEntry1] : I had the pleasure of Ms. Angelia Turner today in follow up.   Her previous history and physical findings have been reviewed.   She is under our care for relapsing-remitting MS. She remains on a regimen of Gilenya 0.5 mg daily, gabapentin 300 mg TID and baclofen 10 mg prn for spasms. She states this regimen continues to keep her MS stable without adverse side effects.  She does also use Modafanil 200 mg BID which promotes wakefulness as the MS makes her tired. MRI of the brain did not show any new demyelinating plaques or lesions and no contrast enhancing lesions. Findings were similar to previous MRIs. 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 She states her father also has that and he was also a smoker just like the patient. Shes had a follow-up with vascular surgery and a consideration of major vascular occlusive disease is being evaluated. Since this is not primarily a neurological complaint, I did not have any significant contribution to make to the resolution of the vascular insufficiency in the right LEs. She indicated that there's definitely decreased blood flow through the arteries in the right leg but the MRI of the brain did not show any worsening of her MS changes.   Patient expressed new concerns regarding dizziness, vision changes and difficulty with balance and coordination, she also notes chronic involuntary movements from long term use of Mertazepine which is prescribed by her psychiatrist. 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 She previously saw Neurosurgery for her neck at which time her MRI was reviewed, and she was advised to f/u with pain management. She did have an MRA of the cervical vessels which the neuroradiologist suggested decreased flow through the left vertebral artery. The MRA shows a dominant right vertebral artery. Patient may have been born with this. The left vertebral artery is not visualized either due to congenital absence of the left vertebral artery or occlusion or stenosis.    In addition, patient complained that for the past three months she's been having numbness in the right lower leg from the knee down to the foot. Initially she thought it might be an injury, but it has persisted. We ordered MRI of the lumbar spine and EMG of the lower extremities. MRI of the lumbar spine revealed moderately severe spinal stenosis at L4-5 which has progressed since February 2016. EMG was abnormal at L5 on the right.    Patient also complains of pain in the clavicles and biceps muscles bilaterally without trauma or injury. I suggested this might be an orthopedic issue and referred her there for evaluation.

## 2025-01-21 DIAGNOSIS — I10 PRIMARY HYPERTENSION: ICD-10-CM

## 2025-01-21 DIAGNOSIS — N39.46 MIXED STRESS AND URGE URINARY INCONTINENCE: ICD-10-CM

## 2025-01-21 RX ORDER — METOPROLOL TARTRATE 25 MG/1
25 TABLET, FILM COATED ORAL 2 TIMES DAILY
Qty: 60 TABLET | Refills: 0 | Status: SHIPPED | OUTPATIENT
Start: 2025-01-21

## 2025-01-21 RX ORDER — TOLTERODINE TARTRATE 2 MG/1
2 TABLET, EXTENDED RELEASE ORAL 2 TIMES DAILY
Qty: 60 TABLET | Refills: 0 | Status: SHIPPED | OUTPATIENT
Start: 2025-01-21

## 2025-01-21 NOTE — TELEPHONE ENCOUNTER
Urinary Anticholinergics Protocol [id number]/21/2025 05:33 AM   Protocol Details Most recent eGFR is above 30 in the past 12 months.    Recent or Future Visit with Provider (12MO/90D)

## 2025-01-22 NOTE — TELEPHONE ENCOUNTER
HUB TO RELAY & SCHEDULE:      2ND ATTEMPT: Called patient to schedule appt for med refills. LVM

## 2025-01-23 NOTE — TELEPHONE ENCOUNTER
HUB TO RELAY & SCHEDULE:      3RD ATTEMPT: Called patient to schedule appt for med refills. LVM

## 2025-04-01 DIAGNOSIS — F32.A DEPRESSION, UNSPECIFIED DEPRESSION TYPE: ICD-10-CM

## 2025-04-02 RX ORDER — DULOXETIN HYDROCHLORIDE 60 MG/1
60 CAPSULE, DELAYED RELEASE ORAL DAILY
Qty: 30 CAPSULE | Refills: 0 | Status: SHIPPED | OUTPATIENT
Start: 2025-04-02

## 2025-04-02 RX ORDER — ATORVASTATIN CALCIUM 40 MG/1
40 TABLET, FILM COATED ORAL
Qty: 90 TABLET | Refills: 0 | Status: SHIPPED | OUTPATIENT
Start: 2025-04-02

## 2025-04-17 ENCOUNTER — LAB (OUTPATIENT)
Dept: LAB | Facility: HOSPITAL | Age: 75
End: 2025-04-17
Payer: MEDICARE

## 2025-04-17 ENCOUNTER — OFFICE VISIT (OUTPATIENT)
Dept: FAMILY MEDICINE CLINIC | Facility: CLINIC | Age: 75
End: 2025-04-17
Payer: MEDICARE

## 2025-04-17 VITALS
HEIGHT: 64 IN | WEIGHT: 210 LBS | OXYGEN SATURATION: 96 % | HEART RATE: 76 BPM | DIASTOLIC BLOOD PRESSURE: 44 MMHG | BODY MASS INDEX: 35.85 KG/M2 | SYSTOLIC BLOOD PRESSURE: 107 MMHG

## 2025-04-17 DIAGNOSIS — Z13.820 SCREENING FOR OSTEOPOROSIS: ICD-10-CM

## 2025-04-17 DIAGNOSIS — I10 PRIMARY HYPERTENSION: ICD-10-CM

## 2025-04-17 DIAGNOSIS — Z13.29 SCREENING FOR THYROID DISORDER: ICD-10-CM

## 2025-04-17 DIAGNOSIS — M19.90 ARTHRITIS: ICD-10-CM

## 2025-04-17 DIAGNOSIS — Z12.31 ENCOUNTER FOR SCREENING MAMMOGRAM FOR MALIGNANT NEOPLASM OF BREAST: ICD-10-CM

## 2025-04-17 DIAGNOSIS — I10 PRIMARY HYPERTENSION: Primary | ICD-10-CM

## 2025-04-17 DIAGNOSIS — Z78.0 MENOPAUSE: ICD-10-CM

## 2025-04-17 DIAGNOSIS — K21.9 GASTROESOPHAGEAL REFLUX DISEASE WITHOUT ESOPHAGITIS: ICD-10-CM

## 2025-04-17 DIAGNOSIS — R41.3 MEMORY LOSS: ICD-10-CM

## 2025-04-17 DIAGNOSIS — F33.0 MAJOR DEPRESSIVE DISORDER, RECURRENT, MILD: ICD-10-CM

## 2025-04-17 DIAGNOSIS — M50.30 DDD (DEGENERATIVE DISC DISEASE), CERVICAL: ICD-10-CM

## 2025-04-17 DIAGNOSIS — E78.5 HYPERLIPIDEMIA, UNSPECIFIED HYPERLIPIDEMIA TYPE: ICD-10-CM

## 2025-04-17 DIAGNOSIS — Z12.11 SCREENING FOR COLON CANCER: ICD-10-CM

## 2025-04-17 DIAGNOSIS — N39.46 MIXED STRESS AND URGE URINARY INCONTINENCE: ICD-10-CM

## 2025-04-17 DIAGNOSIS — E53.8 B12 DEFICIENCY: ICD-10-CM

## 2025-04-17 LAB
ALBUMIN SERPL-MCNC: 3.2 G/DL (ref 3.5–5.2)
ALBUMIN/GLOB SERPL: 0.9 G/DL
ALP SERPL-CCNC: 116 U/L (ref 39–117)
ALT SERPL W P-5'-P-CCNC: 8 U/L (ref 1–33)
ANION GAP SERPL CALCULATED.3IONS-SCNC: 9.5 MMOL/L (ref 5–15)
AST SERPL-CCNC: 18 U/L (ref 1–32)
BASOPHILS # BLD AUTO: 0.04 10*3/MM3 (ref 0–0.2)
BASOPHILS NFR BLD AUTO: 0.6 % (ref 0–1.5)
BILIRUB SERPL-MCNC: 0.6 MG/DL (ref 0–1.2)
BUN SERPL-MCNC: 13 MG/DL (ref 8–23)
BUN/CREAT SERPL: 16.7 (ref 7–25)
CALCIUM SPEC-SCNC: 9.3 MG/DL (ref 8.6–10.5)
CHLORIDE SERPL-SCNC: 105 MMOL/L (ref 98–107)
CHOLEST SERPL-MCNC: 131 MG/DL (ref 0–200)
CO2 SERPL-SCNC: 25.5 MMOL/L (ref 22–29)
CREAT SERPL-MCNC: 0.78 MG/DL (ref 0.57–1)
DEPRECATED RDW RBC AUTO: 42.7 FL (ref 37–54)
EGFRCR SERPLBLD CKD-EPI 2021: 79.8 ML/MIN/1.73
EOSINOPHIL # BLD AUTO: 0.19 10*3/MM3 (ref 0–0.4)
EOSINOPHIL NFR BLD AUTO: 2.9 % (ref 0.3–6.2)
ERYTHROCYTE [DISTWIDTH] IN BLOOD BY AUTOMATED COUNT: 13.4 % (ref 12.3–15.4)
FOLATE SERPL-MCNC: 7.01 NG/ML (ref 4.78–24.2)
GLOBULIN UR ELPH-MCNC: 3.5 GM/DL
GLUCOSE SERPL-MCNC: 85 MG/DL (ref 65–99)
HCT VFR BLD AUTO: 38.6 % (ref 34–46.6)
HDLC SERPL-MCNC: 54 MG/DL (ref 40–60)
HGB BLD-MCNC: 12.5 G/DL (ref 12–15.9)
IMM GRANULOCYTES # BLD AUTO: 0.01 10*3/MM3 (ref 0–0.05)
IMM GRANULOCYTES NFR BLD AUTO: 0.2 % (ref 0–0.5)
LDLC SERPL CALC-MCNC: 64 MG/DL (ref 0–100)
LDLC/HDLC SERPL: 1.19 {RATIO}
LYMPHOCYTES # BLD AUTO: 1.61 10*3/MM3 (ref 0.7–3.1)
LYMPHOCYTES NFR BLD AUTO: 24.7 % (ref 19.6–45.3)
MCH RBC QN AUTO: 28.3 PG (ref 26.6–33)
MCHC RBC AUTO-ENTMCNC: 32.4 G/DL (ref 31.5–35.7)
MCV RBC AUTO: 87.5 FL (ref 79–97)
MONOCYTES # BLD AUTO: 0.39 10*3/MM3 (ref 0.1–0.9)
MONOCYTES NFR BLD AUTO: 6 % (ref 5–12)
NEUTROPHILS NFR BLD AUTO: 4.28 10*3/MM3 (ref 1.7–7)
NEUTROPHILS NFR BLD AUTO: 65.6 % (ref 42.7–76)
NRBC BLD AUTO-RTO: 0 /100 WBC (ref 0–0.2)
PLATELET # BLD AUTO: 202 10*3/MM3 (ref 140–450)
PMV BLD AUTO: 11 FL (ref 6–12)
POTASSIUM SERPL-SCNC: 4.4 MMOL/L (ref 3.5–5.2)
PROT SERPL-MCNC: 6.7 G/DL (ref 6–8.5)
RBC # BLD AUTO: 4.41 10*6/MM3 (ref 3.77–5.28)
SODIUM SERPL-SCNC: 140 MMOL/L (ref 136–145)
TRIGL SERPL-MCNC: 64 MG/DL (ref 0–150)
TSH SERPL DL<=0.05 MIU/L-ACNC: 1.76 UIU/ML (ref 0.27–4.2)
VIT B12 BLD-MCNC: 1009 PG/ML (ref 211–946)
VLDLC SERPL-MCNC: 13 MG/DL (ref 5–40)
WBC NRBC COR # BLD AUTO: 6.52 10*3/MM3 (ref 3.4–10.8)

## 2025-04-17 PROCEDURE — 82607 VITAMIN B-12: CPT

## 2025-04-17 PROCEDURE — 84443 ASSAY THYROID STIM HORMONE: CPT

## 2025-04-17 PROCEDURE — 80061 LIPID PANEL: CPT

## 2025-04-17 PROCEDURE — 85025 COMPLETE CBC W/AUTO DIFF WBC: CPT

## 2025-04-17 PROCEDURE — 82746 ASSAY OF FOLIC ACID SERUM: CPT

## 2025-04-17 PROCEDURE — 36415 COLL VENOUS BLD VENIPUNCTURE: CPT

## 2025-04-17 PROCEDURE — 80053 COMPREHEN METABOLIC PANEL: CPT

## 2025-04-17 NOTE — PROGRESS NOTES
Chief Complaint  Hypertension, Hyperlipidemia, Arthritis, Depression, Heartburn, Urinary Incontinence, and Back Pain    Subjective            Samina Marin presents to [facility]  History of Present Illness  Pt is a f/u for HTN, HLD, arthritis, depression, GERD, and Urinary incontinence. Pt has c/o back pain. Pt states this has been ongoing for approximately many years. Pt reports it is mid to lower back pain and is worsening.  Pt states no longer to go up and down stairs. She was seen in the past by Dr. Mazariegos's office, neurosurgery and was referred to pain mgmt , Dr. Dario Nuñez, and Jonelle Abraham she would like to go back to them.    PT also c/o memory loss and was seeing Neurology, Elle Pedro in the past and states the memory loss has gotten worse and she would like to go back to see her.    Pt is due labs/orders placed.    Pt is due mammogram/orders placed.    Pt is due dexa/orders placed.     Pt is due colon screening, last one in 2017 and was to repeat in 5 years, orders placed.    Pt is due MAW/will schedule.     Pt is accompanied by the son today.     Pt is followed by Cardiology, Dr. Enrique.            Past Medical History:   Diagnosis Date    Allergic rhinitis     Anxiety and depression     Arthritis     Chronic bronchitis     Concussion 2023    Eczema     face & scalp    GERD (gastroesophageal reflux disease)     Hypertension     Perianal abscess     Sinus bradycardia     Stroke 2023    dx by [facility]  ? date    Urinary incontinence     wears pads       Allergies   Allergen Reactions    Meperidine Rash        Past Surgical History:   Procedure Laterality Date    BACK SURGERY      lumbar    CARDIAC CATHETERIZATION N/A 12/09/2021    Procedure: Left Heart Cath;  Surgeon: Marily Maya MD;  Location: [facility] INVASIVE LOCATION;  Service: Cardiology;  Laterality: N/A;    CARDIAC CATHETERIZATION N/A 12/09/2021    Procedure: Coronary angiography;  Surgeon: Francesco  MD Marily;  Location:  [facility] CATH INVASIVE LOCATION;  Service: Cardiology;  Laterality: N/A;    CARDIAC CATHETERIZATION N/A 12/09/2021    Procedure: Left ventriculography;  Surgeon: Marily Maya MD;  Location: [facility] CATH INVASIVE LOCATION;  Service: Cardiology;  Laterality: N/A;    CARDIAC ELECTROPHYSIOLOGY STUDY AND ABLATION      CATARACT EXTRACTION EXTRACAPSULAR W/ INTRAOCULAR LENS IMPLANTATION Bilateral     COLONOSCOPY      ENDOSCOPY      EPIDURAL Right 11/07/2022    Procedure: LUMBAR/SACRAL TRANSFORAMINAL EPIDURAL RIGHT L4, L5;  Surgeon: Jonelle Abraham MD;  Location: [facility];  Service: Pain Management;  Laterality: Right;    HYSTERECTOMY      MEDIAL BRANCH BLOCK Bilateral 01/04/2023    Procedure: LUMBAR MEDIAL BRANCH BLOCK BILATERAL L3-L5 #1;  Surgeon: Jonelle Abraham MD;  Location: [facility];  Service: Pain Management;  Laterality: Bilateral;    ROTATOR CUFF REPAIR Right     SHOULDER ARTHROSCOPY W/ ROTATOR CUFF REPAIR Left 12/28/2023    Procedure: LEFT SHOULDER ARTHROSCOPY WITH ROTATOR CUFF REPAIR BICEP TENODESIS EXTENSIVE DEBRIDEMENT ACROMIOPLASTY DISTIAL CLAVICAL EXCISION;  Surgeon: Yosvany Stock MD;  Location:  [facility];  Service: Orthopedics;  Laterality: Left;    TENDON REPAIR Right     SHOULDER        Social History     Tobacco Use    Smoking status: Never     Passive exposure: Never    Smokeless tobacco: Never    Tobacco comments:     CAFFEINE USE   Substance Use Topics    Alcohol use: Yes     Comment: 5 drinks yearly       Family History   Problem Relation Age of Onset    Heart disease Sister     Ovarian cancer Sister     Liver cancer Sister     Hypertension Sister     Pancreatic cancer Sister     Heart disease Brother     Malig Hyperthermia Neg Hx         Current Outpatient Medications on File Prior to Visit   Medication Sig    amLODIPine (NORVASC) 10 MG tablet Take 1 tablet by mouth Every Morning.    aspirin 81 MG chewable tablet Chew 1 tablet Daily.    atorvastatin (LIPITOR) 40  "MG tablet TAKE 1 TABLET BY MOUTH AT NIGHT    cetirizine (zyrTEC) 10 MG tablet Take 1 tablet by mouth Daily. (Patient taking differently: Take 1 tablet by mouth Daily As Needed.)    diclofenac (VOLTAREN) 50 MG EC tablet Take 1 tablet by mouth Every Morning.    DULoxetine (CYMBALTA) 60 MG capsule TAKE 1 CAPSULE BY MOUTH DAILY    esomeprazole (nexIUM) 40 MG capsule Take 1 capsule by mouth Every Morning Before Breakfast.    ibuprofen (ADVIL,MOTRIN) 800 MG tablet     metoprolol tartrate (LOPRESSOR) 25 MG tablet TAKE 1 TABLET BY MOUTH TWICE  DAILY    tolterodine (DETROL) 2 MG tablet TAKE 1 TABLET BY MOUTH TWICE  DAILY     No current facility-administered medications on file prior to visit.       Health Maintenance Due   Topic Date Due    COLORECTAL CANCER SCREENING  07/18/2022    LIPID PANEL  07/19/2024    ANNUAL WELLNESS VISIT  08/02/2024    DXA SCAN  08/25/2024       Objective     /44   Pulse 76   Ht 162.6 cm (64\")   Wt 95.3 kg (210 lb)   SpO2 96%   BMI 36.05 kg/m²       Physical Exam  Constitutional:       General: She is not in acute distress.     Appearance: Normal appearance. She is not ill-appearing.   HENT:      Head: Normocephalic and atraumatic.      Right Ear: Tympanic membrane, ear canal and external ear normal.      Left Ear: Tympanic membrane, ear canal and external ear normal.      Nose: Nose normal.   Eyes:      Extraocular Movements: Extraocular movements intact.   Cardiovascular:      Rate and Rhythm: Normal rate and regular rhythm.      Heart sounds: Normal heart sounds. No murmur heard.  Pulmonary:      Effort: Pulmonary effort is normal. No respiratory distress.      Breath sounds: Normal breath sounds.   Chest:      Chest wall: No tenderness.   Abdominal:      General: Abdomen is flat. Bowel sounds are normal. There is no distension.      Palpations: Abdomen is soft. There is no mass.      Tenderness: There is no abdominal tenderness. There is no guarding.   Musculoskeletal:         " General: No swelling or tenderness. Normal range of motion.      Cervical back: Normal range of motion and neck supple.      Thoracic back: Tenderness present. No swelling, edema or deformity.      Lumbar back: Tenderness present. No swelling, edema or deformity.   Skin:     General: Skin is warm and dry.      Findings: No rash.   Neurological:      General: No focal deficit present.      Mental Status: She is alert and oriented to person, place, and time. Mental status is at baseline.      Gait: Gait normal.   Psychiatric:         Mood and Affect: Mood normal.         Behavior: Behavior normal.         Thought Content: Thought content normal.         Judgment: Judgment normal.           Result Review :                           Assessment and Plan        Diagnoses and all orders for this visit:    1. Primary hypertension (Primary)  Comments:  stable on Norvasc 10mg and Lopressor 25mg, continue, pt to continue f/u with Cardiology for mgmt  Orders:  -     CBC w AUTO Differential; Future    2. Hyperlipidemia, unspecified hyperlipidemia type  Comments:  stable on Lipitor 40mg, continue  Orders:  -     Comprehensive metabolic panel; Future  -     Lipid panel; Future    3. Arthritis  Comments:  stableon Diclofenac 50mg, continue    4. Gastroesophageal reflux disease without esophagitis  Comments:  stableon Nexium 40mg, continue    5. Major depressive disorder, recurrent, mild  Comments:  stable on Cymbalta 60mg, continue    6. Mixed stress and urge urinary incontinence  Comments:  stable on Detrol 2mg, continue    7. Screening for thyroid disorder  -     TSH; Future    8. Encounter for screening mammogram for malignant neoplasm of breast  -     Mammo Screening Digital Tomosynthesis Bilateral With CAD; Future    9. Menopause  -     Cancel: DEXA Bone Density Axial  -     DEXA Bone Density Axial    10. Screening for osteoporosis  -     Cancel: DEXA Bone Density Axial  -     DEXA Bone Density Axial    11. Screening for colon  cancer  -     Ambulatory Referral For Screening Colonoscopy    12. DDD (degenerative disc disease), cervical  -     Ambulatory Referral to Pain Management    13. Memory loss  -     Ambulatory Referral to Neurology              Follow Up     Return in about 6 months (around 10/17/2025), or if symptoms worsen or fail to improve.      PT to schedule MAW visit with our office.    Patient was given instructions and counseling regarding her condition or for health maintenance advice. Please see specific information pulled into the AVS if appropriate.     Samina CARLOS Marin  reports that she has never smoked. She has never been exposed to tobacco smoke. She has never used smokeless tobacco.

## 2025-04-23 DIAGNOSIS — F32.A DEPRESSION, UNSPECIFIED DEPRESSION TYPE: ICD-10-CM

## 2025-04-24 ENCOUNTER — TELEPHONE (OUTPATIENT)
Dept: FAMILY MEDICINE CLINIC | Facility: CLINIC | Age: 75
End: 2025-04-24
Payer: MEDICARE

## 2025-04-24 DIAGNOSIS — M54.50 LOW BACK PAIN, UNSPECIFIED BACK PAIN LATERALITY, UNSPECIFIED CHRONICITY, UNSPECIFIED WHETHER SCIATICA PRESENT: Primary | ICD-10-CM

## 2025-04-24 RX ORDER — DULOXETIN HYDROCHLORIDE 60 MG/1
60 CAPSULE, DELAYED RELEASE ORAL DAILY
Qty: 90 CAPSULE | Refills: 1 | Status: SHIPPED | OUTPATIENT
Start: 2025-04-24

## 2025-04-24 RX ORDER — LIDOCAINE 50 MG/G
1 PATCH TOPICAL EVERY 24 HOURS
Qty: 14 EACH | Refills: 1 | Status: SHIPPED | OUTPATIENT
Start: 2025-04-24

## 2025-04-24 NOTE — TELEPHONE ENCOUNTER
Patient called wanting to see if Jonelle would be able to write her a prescription for pain patches for her back with lidocaine and if they can be sent to [facility] at [address] [place] [address] in [place], [place].

## 2025-04-28 DIAGNOSIS — N39.46 MIXED STRESS AND URGE URINARY INCONTINENCE: ICD-10-CM

## 2025-04-28 DIAGNOSIS — I10 PRIMARY HYPERTENSION: ICD-10-CM

## 2025-04-28 RX ORDER — METOPROLOL TARTRATE 25 MG/1
25 TABLET, FILM COATED ORAL 2 TIMES DAILY
Qty: 180 TABLET | Refills: 1 | Status: SHIPPED | OUTPATIENT
Start: 2025-04-28

## 2025-04-28 RX ORDER — TOLTERODINE TARTRATE 2 MG/1
2 TABLET, EXTENDED RELEASE ORAL 2 TIMES DAILY
Qty: 180 TABLET | Refills: 1 | Status: SHIPPED | OUTPATIENT
Start: 2025-04-28

## 2025-04-28 NOTE — TELEPHONE ENCOUNTER
Caller: Samina Marin    Relationship: Self    Best call back number: [phone]    Requested Prescriptions:   Requested Prescriptions     Pending Prescriptions Disp Refills    tolterodine (DETROL) 2 MG tablet 60 tablet 0     Sig: Take 1 tablet by mouth 2 (Two) Times a Day.    metoprolol tartrate (LOPRESSOR) 25 MG tablet 60 tablet 0     Sig: Take 1 tablet by mouth 2 (Two) Times a Day.        Pharmacy where request should be sent: [address] [phone] [facility] [phone]      Last office visit with prescribing clinician: 4/17/2025   Last telemedicine visit with prescribing clinician: Visit date not found   Next office visit with prescribing clinician: 5/13/2025     Additional details provided by patient:     Does the patient have less than a 3 day supply:  [] Yes  [x] No        Tavon Tavarez Rep   04/28/25 12:55 EDT

## 2025-05-02 NOTE — PROGRESS NOTES
CC: f/u memory loss    HPI:  Samina Marin is a  74 y.o.  right-handed female with hypertension, SVT status post ablation 2014, history of migraine with aura, prior stroke found on imaging, and depression who is being seen in follow-up today for postconcussive syndrome with associated memory loss.  She is accompanied by her son and daughter-in-law who is a nurse.  She was last seen by me in July 2024.    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  She was started on aspirin 81 mg daily and Lipitor 40 mg daily.     Following her fall in July 2023 she had worsening balance problems, more persistent headache, and complaints of memory loss.  She scored 20 out of 30 on the MoCA in September 2023; she scored 25 out of 30 on the MoCA in February 2024. Today she scored 20 out of 30 on the MoCA. In July 2023 TSH was 1.86, In September 2023 B12 was low normal at 385, folate was 11.5, and RPR was nonreactive.  She was started on oral B12 replacement and B12 level was rechecked November 30, 2023 and was 675.  I had previously recommended cognitive testing with [facility] but this was declined due to financial issues.  Since I last saw her she is retired.    Today she reports ongoing memory issues.  Family notes memory issues 2.  She has trouble coming up with names and has forgotten to pay her water bill.  She has difficulty following conversations.  She also notes worsening back pain.  This has been present for over 2 years but is worse recently and she has an in initial pain management appointment next week.  Reports lying on the couch a lot due to her back pain.  There is also concern for depression.  She has been less interactive, less likely go to family functions.  She has been on Cymbalta for many years.   She scored 29 out of 30 on the MMSE today missing 1 point for delayed recall.    The above history was reviewed and updated.    Past Medical History:   Diagnosis Date    Allergic rhinitis     Anxiety and depression     Arthritis     Chronic bronchitis     Concussion 2023    Eczema     face & scalp    GERD (gastroesophageal reflux disease)     Hypertension     Perianal abscess     Sinus bradycardia     Stroke 2023    dx by [facility]  ? date    Urinary incontinence     wears pads         Past Surgical History:   Procedure Laterality Date    BACK SURGERY      lumbar    CARDIAC CATHETERIZATION N/A 12/09/2021    Procedure: Left Heart Cath;  Surgeon: Marily Maya MD;  Location:  [facility] C[facility] INVASIVE LOCATION;  Service: Cardiology;  Laterality: N/A;    CARDIAC CATHETERIZATION N/A 12/09/2021    Procedure: Coronary angiography;  Surgeon: Marily Maya MD;  Location:  [facility] INVASIVE LOCATION;  Service: Cardiology;  Laterality: N/A;    CARDIAC CATHETERIZATION N/A 12/09/2021    Procedure: Left ventriculography;  Surgeon: Marily Maya MD;  Location:  [facility] CATH INVASIVE LOCATION;  Service: Cardiology;  Laterality: N/A;    CARDIAC ELECTROPHYSIOLOGY STUDY AND ABLATION      CATARACT EXTRACTION EXTRACAPSULAR W/ INTRAOCULAR LENS IMPLANTATION Bilateral     COLONOSCOPY      ENDOSCOPY      EPIDURAL Right 11/07/2022    Procedure: LUMBAR/SACRAL TRANSFORAMINAL EPIDURAL RIGHT L4, L5;  Surgeon: Jonelle Abraham MD;  Location: [facility];  Service: Pain Management;  Laterality: Right;    HYSTERECTOMY      MEDIAL BRANCH BLOCK Bilateral 01/04/2023    Procedure: LUMBAR MEDIAL BRANCH BLOCK BILATERAL L3-L5 #1;  Surgeon: Jonelle Abraham MD;  [facility];  Service: Pain Management;  Laterality: Bilateral;    ROTATOR CUFF REPAIR Right     SHOULDER ARTHROSCOPY W/ ROTATOR CUFF REPAIR Left 12/28/2023    Procedure: LEFT SHOULDER ARTHROSCOPY WITH ROTATOR CUFF REPAIR BICEP TENODESIS EXTENSIVE DEBRIDEMENT ACROMIOPLASTY DISTIAL  CLAVICAL EXCISION;  Surgeon: Yosvany Stock MD;  [facility];  Service: Orthopedics;  Laterality: Left;    TENDON REPAIR Right     SHOULDER           Current Outpatient Medications:     amLODIPine (NORVASC) 10 MG tablet, Take 1 tablet by mouth Every Morning., Disp: , Rfl:     aspirin 81 MG chewable tablet, Chew 1 tablet Daily., Disp: , Rfl:     atorvastatin (LIPITOR) 40 MG tablet, TAKE 1 TABLET BY MOUTH AT NIGHT, Disp: 90 tablet, Rfl: 0    cetirizine (zyrTEC) 10 MG tablet, Take 1 tablet by mouth Daily. (Patient taking differently: Take 1 tablet by mouth Daily As Needed.), Disp: 30 tablet, Rfl: 0    DULoxetine (CYMBALTA) 60 MG capsule, TAKE 1 CAPSULE BY MOUTH DAILY, Disp: 90 capsule, Rfl: 1    esomeprazole (nexIUM) 40 MG capsule, Take 1 capsule by mouth Every Morning Before Breakfast., Disp: , Rfl:     ibuprofen (ADVIL,MOTRIN) 800 MG tablet, , Disp: , Rfl:     lidocaine (LIDODERM) 5 %, Place 1 patch on the skin as directed by provider Daily. Remove & Discard patch within 12 hours or as directed by MD, Disp: 14 each, Rfl: 1    metoprolol tartrate (LOPRESSOR) 25 MG tablet, Take 1 tablet by mouth 2 (Two) Times a Day., Disp: 180 tablet, Rfl: 1    tolterodine (DETROL) 2 MG tablet, Take 1 tablet by mouth 2 (Two) Times a Day., Disp: 180 tablet, Rfl: 1    diclofenac (VOLTAREN) 50 MG EC tablet, Take 1 tablet by mouth Every Morning. (Patient not taking: Reported on 5/6/2025), Disp: , Rfl:     vitamin B-12 (CYANOCOBALAMIN) 1000 MCG tablet, Take 1 tablet by mouth Daily. (Patient not taking: Reported on 5/6/2025), Disp: , Rfl:       Family History   Problem Relation Age of Onset    Heart disease Sister     Ovarian cancer Sister     Liver cancer Sister     Hypertension Sister     Pancreatic cancer Sister     Heart disease Brother     Malig Hyperthermia Neg Hx          Social History     Socioeconomic History    Marital status:    Tobacco Use    Smoking status: Never     Passive exposure: Never    Smokeless tobacco:  "Never    Tobacco comments:     CAFFEINE USE   Vaping Use    Vaping status: Never Used   Substance and Sexual Activity    Alcohol use: Yes     Comment: 5 drinks yearly    Drug use: Never    Sexual activity: Defer         Allergies   Allergen Reactions    Meperidine Rash           Physical Exam:  Vitals:    05/06/25 1429   BP: 122/80   Pulse: 82   SpO2: 97%   Weight: 92.4 kg (203 lb 9.6 oz)   Height: 162.6 cm (64\")     Orthostatic BP:    Body mass index is 34.95 kg/m².    General appearance: Well developed, well nourished, well groomed, alert and cooperative. Flat affect.   HEENT: Normocephalic.   Cardiac: Regular rate and rhythm. No murmurs.   Chest Exam: Clear to auscultation bilaterally, no wheezes, no rhonchi.  Extremities: Normal, no edema.       Higher integrative function: Oriented to time, place, person, intact recent and remote memory, attention span, concentration and language. Spontaneous speech, fund of vocabulary are normal. Scored 29/30 on MMSE.   CN II: Normal visual fields.   CN III IV VI: Extraocular movements are full without nystagmus. Pupils are equal, round, and reactive to light.   CN V: Normal facial sensation.  CN VII: Facial movements are symmetric, no weakness.   CN VIII: Auditory acuity is decreased to finger rub on the R, intact on the L.   CN IX & X: Symmetric palatal movement.   CN XI: Sternocleidomastoid and trapezius are normal. No weakness.   CN XII: The tongue is midline.  Motor: Normal muscle strength, bulk, and tone in upper and lower extremities. No fasciculations, rigidity, spasticity or abnormal movements.   Sensation: Intact to LT in all extremities.   Station and gait: Antalgic gait.  Coordination: Finger to nose test showed no dysmetria.     Results:      Lab Results   Component Value Date    GLUCOSE 85 04/17/2025    BUN 13 04/17/2025    CREATININE 0.78 04/17/2025    EGFRIFNONA 95 12/07/2021    BCR 16.7 04/17/2025    CO2 25.5 04/17/2025    CALCIUM 9.3 04/17/2025    ALBUMIN 3.2 " (L) 04/17/2025    AST 18 04/17/2025    ALT 8 04/17/2025       Lab Results   Component Value Date    WBC 6.52 04/17/2025    HGB 12.5 04/17/2025    HCT 38.6 04/17/2025    MCV 87.5 04/17/2025     04/17/2025         .  Lab Results   Component Value Date    RPR Non-Reactive 09/27/2023         Lab Results   Component Value Date    TSH 1.760 04/17/2025         Lab Results   Component Value Date    [id number] 1,009 (H) 04/17/2025         Lab Results   Component Value Date    FOLATE 7.01 04/17/2025         Lab Results   Component Value Date    HGBA1C 5.40 07/19/2023         Lab Results   Component Value Date    GLUCOSE 85 04/17/2025    BUN 13 04/17/2025    CREATININE 0.78 04/17/2025    EGFRIFNONA 95 12/07/2021    BCR 16.7 04/17/2025    K 4.4 04/17/2025    CO2 25.5 04/17/2025    CALCIUM 9.3 04/17/2025    ALBUMIN 3.2 (L) 04/17/2025    AST 18 04/17/2025    ALT 8 04/17/2025         Lab Results   Component Value Date    WBC 6.52 04/17/2025    HGB 12.5 04/17/2025    HCT 38.6 04/17/2025    MCV 87.5 04/17/2025     04/17/2025             Assessment/Plan:     Diagnoses and all orders for this visit:    1. Memory loss (Primary)  -     Ambulatory Referral to Neuropsychology    2. Moderate recurrent major depression  -     Ambulatory Referral to Psychiatry  -     Ambulatory Referral to Psychology        Patient with complaints of worsening memory loss.  Concern for pseudodementia related to depression.  Chronic back pain could also be contributing.  Referral to [facility] for [facility]  Referral to psychiatry for medication adjustment for depression  Referral to psychology for counseling  Sees pain management next week for back pain     Follow-up with me in 5 months to review neuropsych testing    Total time:>35 min            Dictated utilizing Dragon dictation.

## 2025-05-06 ENCOUNTER — OFFICE VISIT (OUTPATIENT)
Dept: NEUROLOGY | Facility: CLINIC | Age: 75
End: 2025-05-06
Payer: MEDICARE

## 2025-05-06 VITALS
SYSTOLIC BLOOD PRESSURE: 122 MMHG | HEIGHT: 64 IN | BODY MASS INDEX: 34.76 KG/M2 | HEART RATE: 82 BPM | DIASTOLIC BLOOD PRESSURE: 80 MMHG | OXYGEN SATURATION: 97 % | WEIGHT: 203.6 LBS

## 2025-05-06 DIAGNOSIS — F33.1 MODERATE RECURRENT MAJOR DEPRESSION: ICD-10-CM

## 2025-05-06 DIAGNOSIS — R41.3 MEMORY LOSS: Primary | ICD-10-CM

## 2025-05-06 PROCEDURE — 1159F MED LIST DOCD IN RCRD: CPT | Performed by: NURSE PRACTITIONER

## 2025-05-06 PROCEDURE — 1160F RVW MEDS BY RX/DR IN RCRD: CPT | Performed by: NURSE PRACTITIONER

## 2025-05-06 PROCEDURE — 3074F SYST BP LT 130 MM HG: CPT | Performed by: NURSE PRACTITIONER

## 2025-05-06 PROCEDURE — 99214 OFFICE O/P EST MOD 30 MIN: CPT | Performed by: NURSE PRACTITIONER

## 2025-05-06 PROCEDURE — 3079F DIAST BP 80-89 MM HG: CPT | Performed by: NURSE PRACTITIONER

## 2025-05-06 RX ORDER — LANOLIN ALCOHOL/MO/W.PET/CERES
1 CREAM (GRAM) TOPICAL DAILY
COMMUNITY
End: 2025-05-12

## 2025-05-06 NOTE — PATIENT INSTRUCTIONS
Referral to neuropsychology for cognitive testing- Please call to schedule visits- [facility] [phone]    Referral to psychology for counseling  Referral to psychiatry for medication management for depression  Fall Prevention in the Home, Adult  Falls can cause injuries and affect people of all ages. There are many simple things that you can do to make your home safe and to help prevent falls.  If you need it, ask for help making these changes.  What actions can I take to prevent falls?  General information  Use good lighting in all rooms. Make sure to:  Replace any light bulbs that burn out.  Turn on lights if it is dark and use night-lights.  Keep items that you use often in easy-to-reach places. Lower the shelves around your home if needed.  Move furniture so that there are clear paths around it.  Do not keep throw rugs or other things on the floor that can make you trip.  If any of your floors are uneven, fix them.  Add color or contrast paint or tape to clearly eugenio and help you see:  Grab bars or handrails.  First and last steps of staircases.  Where the edge of each step is.  If you use a ladder or stepladder:  Make sure that it is fully opened. Do not climb a closed ladder.  Make sure the sides of the ladder are locked in place.  Have someone hold the ladder while you use it.  Know where your pets are as you move through your home.  What can I do in the bathroom?         Keep the floor dry. Clean up any water that is on the floor right away.  Remove soap buildup in the bathtub or shower. Buildup makes bathtubs and showers slippery.  Use non-skid mats or decals on the floor of the bathtub or shower.  Attach bath mats securely with double-sided, non-slip rug tape.  If you need to sit down while you are in the shower, use a non-slip stool.  Install grab bars by the toilet and in the bathtub and shower. Do not use towel bars as grab bars.  What can I do in the bedroom?  Make sure that you have a  light by your bed that is easy to reach.  Do not use any sheets or blankets on your bed that hang to the floor.  Have a firm bench or chair with side arms that you can use for support when you get dressed.  What can I do in the kitchen?  Clean up any spills right away.  If you need to reach something above you, use a sturdy step stool that has a grab bar.  Keep electrical cables out of the way.  Do not use floor polish or wax that makes floors slippery.  What can I do with my stairs?  Do not leave anything on the stairs.  Make sure that you have a light switch at the top and the bottom of the stairs. Have them installed if you do not have them.  Make sure that there are handrails on both sides of the stairs. Fix handrails that are broken or loose. Make sure that handrails are as long as the staircases.  Install non-slip stair treads on all stairs in your home if they do not have carpet.  Avoid having throw rugs at the top or bottom of stairs, or secure the rugs with carpet tape to prevent them from moving.  Choose a carpet design that does not hide the edge of steps on the stairs. Make sure that carpet is firmly attached to the stairs. Fix any carpet that is loose or worn.  What can I do on the outside of my home?  Use bright outdoor lighting.  Repair the edges of walkways and driveways and fix any cracks. Clear paths of anything that can make you trip, such as tools or rocks.  Add color or contrast paint or tape to clearly eugenio and help you see high doorway thresholds.  Trim any bushes or trees on the main path into your home.  Check that handrails are securely fastened and in good repair. Both sides of all steps should have handrails.  Install guardrails along the edges of any raised decks or porches.  Have leaves, snow, and ice cleared regularly. Use sand, salt, or ice melt on walkways during winter months if you live where there is ice and snow.  In the garage, clean up any spills right away, including grease or  oil spills.  What other actions can I take?  Review your medicines with your health care provider. Some medicines can make you confused or feel dizzy. This can increase your chance of falling.  Wear closed-toe shoes that fit well and support your feet. Wear shoes that have rubber soles and low heels.  Use a cane, walker, scooter, or crutches that help you move around if needed.  Talk with your provider about other ways that you can decrease your risk of falls. This may include seeing a physical therapist to learn to do exercises to improve movement and strength.  Where to find more information  Centers for Disease Control and Prevention, MARTINE: cdc.gov  National [place] on Aging: rain.nih.gov  National [place] on Aging: rain.nih.gov  Contact a health care provider if:  You are afraid of falling at home.  You feel weak, drowsy, or dizzy at home.  You fall at home.  Get help right away if you:  Lose consciousness or have trouble moving after a fall.  Have a fall that causes a head injury.  These symptoms may be an emergency. Get help right away. Call 911.  Do not wait to see if the symptoms will go away.  Do not drive yourself to the hospital.  This information is not intended to replace advice given to you by your health care provider. Make sure you discuss any questions you have with your health care provider.  Document Revised: 08/21/2023 Document Reviewed: 08/21/2023  [employer] Patient Education © 2024 [employer]

## 2025-05-06 NOTE — LETTER
May 6, 2025     HORTENSIA Schmitt  [address] [facility]  [address]  [place] [address]    Patient: Samina Marin   YOB: 1950   Date of Visit: 5/6/2025     Dear HORTENSIA Schmitt:       Thank you for referring Samina Marin to me for evaluation. Below are the relevant portions of my assessment and plan of care.    If you have questions, please do not hesitate to call me. I look forward to following Samina along with you.         Sincerely,        HORTENSIA Paul        CC: No Recipients    Elle Pedro APRN  05/06/25 1524  Sign when Signing Visit  CC: f/u memory loss    HPI:  Samina Marin is a  74 y.o.  right-handed female with hypertension, SVT status post ablation 2014, history of migraine with aura, prior stroke found on imaging, and depression who is being seen in follow-up today for postconcussive syndrome with associated memory loss.  She is accompanied by her son and daughter-in-law who is a nurse.  She was last seen by me in July 2024.    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  She was started on aspirin 81 mg daily and Lipitor 40 mg daily.     Following her fall in July 2023 she had worsening balance problems, more persistent headache, and complaints of memory loss.  She scored 20 out of 30 on the MoCA in September 2023; she scored 25 out of 30 on the MoCA in February 2024. Today she scored 20 out of 30 on the MoCA. In July 2023 TSH was 1.86, In September 2023 B12 was low normal at 385, folate was 11.5, and RPR was nonreactive.  She was started on oral B12 replacement and B12 level was rechecked November 30, 2023 and was 675.  I had previously recommended cognitive testing with [facility] but this was declined due to financial issues.  Since I last saw her  she is retired.    Today she reports ongoing memory issues.  Family notes memory issues 2.  She has trouble coming up with names and has forgotten to pay her water bill.  She has difficulty following conversations.  She also notes worsening back pain.  This has been present for over 2 years but is worse recently and she has an in initial pain management appointment next week.  Reports lying on the couch a lot due to her back pain.  There is also concern for depression.  She has been less interactive, less likely go to family functions.  She has been on Cymbalta for many years.  She scored 29 out of 30 on the MMSE today missing 1 point for delayed recall.    The above history was reviewed and updated.    Past Medical History:   Diagnosis Date   • Allergic rhinitis    • Anxiety and depression    • Arthritis    • Chronic bronchitis    • Concussion 2023   • Eczema     face & scalp   • GERD (gastroesophageal reflux disease)    • Hypertension    • Perianal abscess    • Sinus bradycardia    • Stroke 2023    dx by [facility]  ? date   • Urinary incontinence     wears pads         Past Surgical History:   Procedure Laterality Date   • BACK SURGERY      lumbar   • CARDIAC CATHETERIZATION N/A 12/09/2021    Procedure: Left Heart Cath;  Surgeon: Marily Maya MD;  Location:  [facility];  Service: Cardiology;  Laterality: N/A;   • CARDIAC CATHETERIZATION N/A 12/09/2021    Procedure: Coronary angiography;  Surgeon: Marily Maya MD;  Location:  [facility];  Service: Cardiology;  Laterality: N/A;   • CARDIAC CATHETERIZATION N/A 12/09/2021    Procedure: Left ventriculography;  Surgeon: Marily Maya MD;  Location:  [facility];  Service: Cardiology;  Laterality: N/A;   • CARDIAC ELECTROPHYSIOLOGY STUDY AND ABLATION     • CATARACT EXTRACTION EXTRACAPSULAR W/ INTRAOCULAR LENS IMPLANTATION Bilateral    • COLONOSCOPY     • ENDOSCOPY     • EPIDURAL Right 11/07/2022    Procedure:  LUMBAR/SACRAL TRANSFORAMINAL EPIDURAL RIGHT L4, L5;  Surgeon: Jonelle Abraham MD;  Location: [facility];  Service: Pain Management;  Laterality: Right;   • HYSTERECTOMY     • MEDIAL BRANCH BLOCK Bilateral 01/04/2023    Procedure: LUMBAR MEDIAL BRANCH BLOCK BILATERAL L3-L5 #1;  Surgeon: Jonelle Abraham MD;  Location: [facility];  Service: Pain Management;  Laterality: Bilateral;   • ROTATOR CUFF REPAIR Right    • SHOULDER ARTHROSCOPY W/ ROTATOR CUFF REPAIR Left 12/28/2023    Procedure: LEFT SHOULDER ARTHROSCOPY WITH ROTATOR CUFF REPAIR BICEP TENODESIS EXTENSIVE DEBRIDEMENT ACROMIOPLASTY DISTIAL CLAVICAL EXCISION;  Surgeon: Yosvany Stock MD;  Location: [facility] [place];  Service: Orthopedics;  Laterality: Left;   • TENDON REPAIR Right     SHOULDER           Current Outpatient Medications:   •  amLODIPine (NORVASC) 10 MG tablet, Take 1 tablet by mouth Every Morning., Disp: , Rfl:   •  aspirin 81 MG chewable tablet, Chew 1 tablet Daily., Disp: , Rfl:   •  atorvastatin (LIPITOR) 40 MG tablet, TAKE 1 TABLET BY MOUTH AT NIGHT, Disp: 90 tablet, Rfl: 0  •  cetirizine (zyrTEC) 10 MG tablet, Take 1 tablet by mouth Daily. (Patient taking differently: Take 1 tablet by mouth Daily As Needed.), Disp: 30 tablet, Rfl: 0  •  DULoxetine (CYMBALTA) 60 MG capsule, TAKE 1 CAPSULE BY MOUTH DAILY, Disp: 90 capsule, Rfl: 1  •  esomeprazole (nexIUM) 40 MG capsule, Take 1 capsule by mouth Every Morning Before Breakfast., Disp: , Rfl:   •  ibuprofen (ADVIL,MOTRIN) 800 MG tablet, , Disp: , Rfl:   •  lidocaine (LIDODERM) 5 %, Place 1 patch on the skin as directed by provider Daily. Remove & Discard patch within 12 hours or as directed by MD, Disp: 14 each, Rfl: 1  •  metoprolol tartrate (LOPRESSOR) 25 MG tablet, Take 1 tablet by mouth 2 (Two) Times a Day., Disp: 180 tablet, Rfl: 1  •  tolterodine (DETROL) 2 MG tablet, Take 1 tablet by mouth 2 (Two) Times a Day., Disp: 180 tablet, Rfl: 1  •  diclofenac (VOLTAREN) 50 MG EC tablet, Take 1 tablet  "by mouth Every Morning. (Patient not taking: Reported on 5/6/2025), Disp: , Rfl:   •  vitamin B-12 (CYANOCOBALAMIN) 1000 MCG tablet, Take 1 tablet by mouth Daily. (Patient not taking: Reported on 5/6/2025), Disp: , Rfl:       Family History   Problem Relation Age of Onset   • Heart disease Sister    • Ovarian cancer Sister    • Liver cancer Sister    • Hypertension Sister    • Pancreatic cancer Sister    • Heart disease Brother    • Malig Hyperthermia Neg Hx          Social History     Socioeconomic History   • Marital status:    Tobacco Use   • Smoking status: Never     Passive exposure: Never   • Smokeless tobacco: Never   • Tobacco comments:     CAFFEINE USE   Vaping Use   • Vaping status: Never Used   Substance and Sexual Activity   • Alcohol use: Yes     Comment: 5 drinks yearly   • Drug use: Never   • Sexual activity: Defer         Allergies   Allergen Reactions   • Meperidine Rash           Physical Exam:  Vitals:    05/06/25 1429   BP: 122/80   Pulse: 82   SpO2: 97%   Weight: 92.4 kg (203 lb 9.6 oz)   Height: 162.6 cm (64\")     Orthostatic BP:    Body mass index is 34.95 kg/m².    General appearance: Well developed, well nourished, well groomed, alert and cooperative. Flat affect.   HEENT: Normocephalic.   Cardiac: Regular rate and rhythm. No murmurs.   Chest Exam: Clear to auscultation bilaterally, no wheezes, no rhonchi.  Extremities: Normal, no edema.       Higher integrative function: Oriented to time, place, person, intact recent and remote memory, attention span, concentration and language. Spontaneous speech, fund of vocabulary are normal. Scored 29/30 on MMSE.   CN II: Normal visual fields.   CN III IV VI: Extraocular movements are full without nystagmus. Pupils are equal, round, and reactive to light.   CN V: Normal facial sensation.  CN VII: Facial movements are symmetric, no weakness.   CN VIII: Auditory acuity is decreased to finger rub on the R, intact on the L.   CN IX & X: Symmetric " palatal movement.   CN XI: Sternocleidomastoid and trapezius are normal. No weakness.   CN XII: The tongue is midline.  Motor: Normal muscle strength, bulk, and tone in upper and lower extremities. No fasciculations, rigidity, spasticity or abnormal movements.   Sensation: Intact to LT in all extremities.   Station and gait: Antalgic gait.  Coordination: Finger to nose test showed no dysmetria.     Results:      Lab Results   Component Value Date    GLUCOSE 85 04/17/2025    BUN 13 04/17/2025    CREATININE 0.78 04/17/2025    EGFRIFNONA 95 12/07/2021    BCR 16.7 04/17/2025    CO2 25.5 04/17/2025    CALCIUM 9.3 04/17/2025    ALBUMIN 3.2 (L) 04/17/2025    AST 18 04/17/2025    ALT 8 04/17/2025       Lab Results   Component Value Date    WBC 6.52 04/17/2025    HGB 12.5 04/17/2025    HCT 38.6 04/17/2025    MCV 87.5 04/17/2025     04/17/2025         .  Lab Results   Component Value Date    RPR Non-Reactive 09/27/2023         Lab Results   Component Value Date    TSH 1.760 04/17/2025         Lab Results   Component Value Date    NJRJEHSG87 1,009 (H) 04/17/2025         Lab Results   Component Value Date    FOLATE 7.01 04/17/2025         Lab Results   Component Value Date    HGBA1C 5.40 07/19/2023         Lab Results   Component Value Date    GLUCOSE 85 04/17/2025    BUN 13 04/17/2025    CREATININE 0.78 04/17/2025    EGFRIFNONA 95 12/07/2021    BCR 16.7 04/17/2025    K 4.4 04/17/2025    CO2 25.5 04/17/2025    CALCIUM 9.3 04/17/2025    ALBUMIN 3.2 (L) 04/17/2025    AST 18 04/17/2025    ALT 8 04/17/2025         Lab Results   Component Value Date    WBC 6.52 04/17/2025    HGB 12.5 04/17/2025    HCT 38.6 04/17/2025    MCV 87.5 04/17/2025     04/17/2025             Assessment/Plan:     Diagnoses and all orders for this visit:    1. Memory loss (Primary)  -     Ambulatory Referral to Neuropsychology    2. Moderate recurrent major depression  -     Ambulatory Referral to Psychiatry  -     Ambulatory Referral to  Psychology        Patient with complaints of worsening memory loss.  Concern for pseudodementia related to depression.  Chronic back pain could also be contributing.  [facility]  Referral to psychiatry for medication adjustment for depression  Referral to psychology for counseling  Sees pain management next week for back pain     Follow-up with me in 5 months to review neuropsych testing    Total time:>35 min            Dictated utilizing Dragon dictation.

## 2025-05-07 ENCOUNTER — HOSPITAL ENCOUNTER (OUTPATIENT)
Dept: BONE DENSITY | Facility: HOSPITAL | Age: 75
Discharge: HOME | End: 2025-05-07
Payer: MEDICARE

## 2025-05-07 ENCOUNTER — HOSPITAL ENCOUNTER (OUTPATIENT)
Dept: MAMMOGRAPHY | Facility: HOSPITAL | Age: 75
Discharge: HOME | End: 2025-05-07
Payer: MEDICARE

## 2025-05-07 DIAGNOSIS — Z12.31 ENCOUNTER FOR SCREENING MAMMOGRAM FOR MALIGNANT NEOPLASM OF BREAST: ICD-10-CM

## 2025-05-07 PROCEDURE — 77080 DXA BONE DENSITY AXIAL: CPT

## 2025-05-07 PROCEDURE — 77063 BREAST TOMOSYNTHESIS BI: CPT

## 2025-05-07 PROCEDURE — 77067 SCR MAMMO BI INCL CAD: CPT

## 2025-05-13 ENCOUNTER — TELEPHONE (OUTPATIENT)
Dept: FAMILY MEDICINE CLINIC | Facility: CLINIC | Age: 75
End: 2025-05-13

## 2025-05-13 ENCOUNTER — OFFICE VISIT (OUTPATIENT)
Dept: FAMILY MEDICINE CLINIC | Facility: CLINIC | Age: 75
End: 2025-05-13
Payer: MEDICARE

## 2025-05-13 VITALS
WEIGHT: 207 LBS | SYSTOLIC BLOOD PRESSURE: 98 MMHG | BODY MASS INDEX: 35.53 KG/M2 | HEART RATE: 78 BPM | DIASTOLIC BLOOD PRESSURE: 45 MMHG | OXYGEN SATURATION: 98 %

## 2025-05-13 DIAGNOSIS — Z00.00 MEDICARE ANNUAL WELLNESS VISIT, SUBSEQUENT: Primary | ICD-10-CM

## 2025-05-13 RX ORDER — METHYLPREDNISOLONE 4 MG/1
TABLET ORAL
Qty: 21 EACH | Refills: 0 | Status: SHIPPED | OUTPATIENT
Start: 2025-05-13

## 2025-05-13 NOTE — PROGRESS NOTES
Subjective   The ABCs of the Annual Wellness Visit  Medicare Wellness Visit      Samina Marin is a 74 y.o. patient who presents for a Medicare Wellness Visit.    The following portions of the patient's history were reviewed and   updated as appropriate: allergies, current medications, past family history, past medical history, past social history, past surgical history, and problem list.    Compared to one year ago, the patient's physical   health is the same.  Compared to one year ago, the patient's mental   health is the same.    Recent Hospitalizations:  She was not admitted to the hospital during the last year.     Current Medical Providers:  Patient Care Team:  Jonelle Joseph APRN as PCP - General (Nurse Practitioner)  Elle Pedro APRN as Nurse Practitioner (Neurology)  Rosendo Enrique MD as Consulting Physician (Cardiology)    Outpatient Medications Prior to Visit   Medication Sig Dispense Refill    amLODIPine (NORVASC) 10 MG tablet Take 1 tablet by mouth Every Morning.      aspirin 81 MG chewable tablet Chew 1 tablet Daily.      atorvastatin (LIPITOR) 40 MG tablet TAKE 1 TABLET BY MOUTH AT NIGHT 90 tablet 0    cetirizine (zyrTEC) 10 MG tablet Take 1 tablet by mouth Daily. (Patient taking differently: Take 1 tablet by mouth Daily As Needed.) 30 tablet 0    DULoxetine (CYMBALTA) 60 MG capsule TAKE 1 CAPSULE BY MOUTH DAILY 90 capsule 1    esomeprazole (nexIUM) 40 MG capsule Take 1 capsule by mouth Every Morning Before Breakfast.      ibuprofen (ADVIL,MOTRIN) 800 MG tablet       lidocaine (LIDODERM) 5 % Place 1 patch on the skin as directed by provider Daily. Remove & Discard patch within 12 hours or as directed by MD 14 each 1    metoprolol tartrate (LOPRESSOR) 25 MG tablet Take 1 tablet by mouth 2 (Two) Times a Day. 180 tablet 1    tolterodine (DETROL) 2 MG tablet Take 1 tablet by mouth 2 (Two) Times a Day. 180 tablet 1    diclofenac (VOLTAREN) 50 MG EC tablet Take 1 tablet by mouth Every  "Morning. (Patient not taking: Reported on 5/6/2025)      vitamin B-12 (CYANOCOBALAMIN) 1000 MCG tablet Take 1 tablet by mouth Daily. (Patient not taking: Reported on 5/6/2025)       No facility-administered medications prior to visit.     No opioid medication identified on active medication list. I have reviewed chart for other potential  high risk medication/s and harmful drug interactions in the elderly.      Aspirin is on active medication list. Aspirin use is indicated based on review of current medical condition/s. Pros and cons of this therapy have been discussed today. Benefits of this medication outweigh potential harm.  Patient has been encouraged to continue taking this medication.  .      Patient Active Problem List   Diagnosis    Allergic rhinitis    Aortic valve regurgitation    Arthritis    Asthma    Carpal tunnel syndrome, bilateral    DDD (degenerative disc disease), lumbar    Esophageal reflux    Essential hypertension    Fibromyalgia    Major depressive disorder    Mixed stress and urge urinary incontinence    Mood disorder    Obesity    Paroxysmal supraventricular tachycardia    Postmenopausal    Shortness of breath    Skin cancer    Vitamin D deficiency    Angina pectoris    Lumbar radiculopathy    Trochanteric bursitis of right hip    Muscle spasm    Thoracic spine pain    Lumbar facet arthropathy    Closed head injury    Migraine with aura and without status migrainosus    Pain in joint of left shoulder     Advance Care Planning Advance Directive is not on file.  ACP discussion was held with the patient during this visit. Patient does not have an advance directive, declines further assistance.            Objective   Vitals:    05/13/25 1114   BP: 98/45   Pulse: 78   SpO2: 98%   Weight: 93.9 kg (207 lb)   PainSc: 0-No pain       Estimated body mass index is 35.53 kg/m² as calculated from the following:    Height as of 5/6/25: 162.6 cm (64\").    Weight as of this encounter: 93.9 kg (207 lb).     "            Does the patient have evidence of cognitive impairment? No  Lab Results   Component Value Date    TRIG 64 2025    HDL 54 2025    LDL 64 2025    VLDL 13 2025                                                                                               Health  Risk Assessment    Smoking Status:  Social History     Tobacco Use   Smoking Status Never    Passive exposure: Never   Smokeless Tobacco Never   Tobacco Comments    CAFFEINE USE     Alcohol Consumption:  Social History     Substance and Sexual Activity   Alcohol Use Yes    Comment: 5 drinks yearly       Fall Risk Screen  MARTINE Fall Risk Assessment was completed, and patient is at LOW risk for falls.Assessment completed on:2025    Depression Screening   Little interest or pleasure in doing things? Not at all   Feeling down, depressed, or hopeless? Not at all   PHQ-2 Total Score 0      Health Habits and Functional and Cognitive Screenin/13/2025    11:00 AM   Functional & Cognitive Status   Do you have difficulty preparing food and eating? No   Do you have difficulty bathing yourself, getting dressed or grooming yourself? No   Do you have difficulty using the toilet? No   Do you have difficulty moving around from place to place? No   Do you have trouble with steps or getting out of a bed or a chair? No   Current Diet Frequent Junk Food   Dental Exam Not up to date   Eye Exam Up to date   Exercise (times per week) 0 times per week   Current Exercises Include No Regular Exercise   Do you need help using the phone?  No   Are you deaf or do you have serious difficulty hearing?  No   Do you need help to go to places out of walking distance? No   Do you need help shopping? No   Do you need help preparing meals?  No   Do you need help with housework?  No   Do you need help with laundry? No   Do you need help taking your medications? No   Do you need help managing money? No   Do you ever drive or ride in a car without  wearing a seat belt? No   Have you felt unusual stress, anger or loneliness in the last month? Yes   Who do you live with? Alone   If you need help, do you have trouble finding someone available to you? No   Have you been bothered in the last four weeks by sexual problems? No   Do you have difficulty concentrating, remembering or making decisions? No           Age-appropriate Screening Schedule:  Refer to the list below for future screening recommendations based on patient's age, sex and/or medical conditions. Orders for these recommended tests are listed in the plan section. The patient has been provided with a written plan.    Health Maintenance List  Health Maintenance   Topic Date Due    COLORECTAL CANCER SCREENING  07/18/2022    TDAP/TD VACCINES (1 - Tdap) 06/11/2025 (Originally 12/1/1969)    COVID-19 Vaccine (4 - 2024-25 season) 04/17/2026 (Originally 9/1/2024)    ZOSTER VACCINE (1 of 2) 04/17/2026 (Originally 12/1/2000)    INFLUENZA VACCINE  07/01/2025    LIPID PANEL  04/17/2026    ANNUAL WELLNESS VISIT  05/13/2026    MAMMOGRAM  05/07/2027    DXA SCAN  05/07/2027    HEPATITIS C SCREENING  Completed    Pneumococcal Vaccine 50+  Completed                                                                                                                                                CMS Preventative Services Quick Reference  Risk Factors Identified During Encounter  None Identified    The above risks/problems have been discussed with the patient.  Pertinent information has been shared with the patient in the After Visit Summary.  An After Visit Summary and PPPS were made available to the patient.    Follow Up:   Next Medicare Wellness visit to be scheduled in 1 year.     Assessment & Plan  Medicare annual wellness visit, subsequent              Follow Up:   Return in about 1 year (around 5/13/2026) for Medicare Wellness.

## 2025-05-13 NOTE — TELEPHONE ENCOUNTER
Pt states she is having some sinus issues. Pt has been taking OTC medication with no relief. Pt is requesting a medrol pack to [facility] in [place].

## 2025-05-21 ENCOUNTER — OFFICE VISIT (OUTPATIENT)
Dept: PAIN MEDICINE | Facility: CLINIC | Age: 75
End: 2025-05-21
Payer: MEDICARE

## 2025-05-21 VITALS
HEIGHT: 64 IN | SYSTOLIC BLOOD PRESSURE: 122 MMHG | HEART RATE: 77 BPM | OXYGEN SATURATION: 96 % | TEMPERATURE: 97.3 F | BODY MASS INDEX: 35.34 KG/M2 | DIASTOLIC BLOOD PRESSURE: 75 MMHG | WEIGHT: 207 LBS

## 2025-05-21 DIAGNOSIS — M51.34 THORACIC DEGENERATIVE DISC DISEASE: ICD-10-CM

## 2025-05-21 DIAGNOSIS — M51.362 DEGENERATION OF INTERVERTEBRAL DISC OF LUMBAR REGION WITH DISCOGENIC BACK PAIN AND LOWER EXTREMITY PAIN: ICD-10-CM

## 2025-05-21 DIAGNOSIS — M54.16 LUMBAR RADICULOPATHY: Primary | ICD-10-CM

## 2025-05-21 PROCEDURE — 99214 OFFICE O/P EST MOD 30 MIN: CPT | Performed by: PHYSICIAN ASSISTANT

## 2025-05-21 PROCEDURE — 1160F RVW MEDS BY RX/DR IN RCRD: CPT | Performed by: PHYSICIAN ASSISTANT

## 2025-05-21 PROCEDURE — 1125F AMNT PAIN NOTED PAIN PRSNT: CPT | Performed by: PHYSICIAN ASSISTANT

## 2025-05-21 PROCEDURE — 3074F SYST BP LT 130 MM HG: CPT | Performed by: PHYSICIAN ASSISTANT

## 2025-05-21 PROCEDURE — 3078F DIAST BP <80 MM HG: CPT | Performed by: PHYSICIAN ASSISTANT

## 2025-05-21 PROCEDURE — 1159F MED LIST DOCD IN RCRD: CPT | Performed by: PHYSICIAN ASSISTANT

## 2025-05-21 RX ORDER — PREGABALIN 50 MG/1
50 CAPSULE ORAL 3 TIMES DAILY
Qty: 90 CAPSULE | Refills: 0 | Status: SHIPPED | OUTPATIENT
Start: 2025-05-21

## 2025-05-21 NOTE — PROGRESS NOTES
CHIEF COMPLAINT  F/U back pain- patient states that her pain has worsened since her last visit. Overall is having worsening of pain--pain with any type of activity  Sitting for prologned perids of time increases pain      Subjective   Samina Marin is a 74 y.o. female  who presents for follow-up.  She has a history of low back pain.  The patient underwent #1 diagnostic bilateral L4-S1 medial branch block on 1/4/2023 with Dr. Abraham for low back pain.  She reports suboptimal pain relief stating that the MBB actually increased her back pain.  Samina was then lost to follow-up as she never returned for her postprocedure visit stating that she became frustrated and also began having medical issues.  She reports that she suffered a fall at work approximately 2 years ago and after having a CT scan of the head it showed that she had evidence of an old CVA that had occurred.  She has been having some issues with increasing forgetfulness and pending neuropsychology testing for this issue.  The patient continues to have significant worsening of pain in a transverse distribution across the lumbar spine radiating into the right lower extremity terminating at the foot.  The patient finds that as long as she lays down with her right leg straight out that she does not have any pain which is now requiring her to sleep on the couch.  Sitting for any length of time significantly aggravates her pain.  She has noted some increased pain radiating across the thoracic region across the bra strap line.    Previous surgical history of lumbar surgery that occurred over 30 years ago.    Patient failed previous trial of gabapentin 100 mg due to significant daytime somnolence.    She returns to the office today for pain management options as she is not interested in any further injective therapy.    Pain today 5/10 VAS in severity.      Back Pain  Chronicity:  Chronic  Onset:  More than 1 year ago  Frequency:  Constantly  Progression since onset:   Worsening  Pain location:  Lumbar spine and thoracic spine  Pain quality:  Aching, burning and shooting  Radiates to:  Right buttock, right thigh and right anterolateral lower leg  Pain-numeric:  5/10  Pain severity:  Moderate  Pain is:  The same all the time  Aggravated by:  Standing (walking/activity)  Associated symptoms: headaches, leg pain and weakness    Associated symptoms: no abdominal pain, no chest pain, no dysuria, no fever and no numbness    Treatments tried:  Ice and heat  Improvement on treatment:  Mild       PEG Assessment   What number best describes your pain on average in the past week?9  What number best describes how, during the past week, pain has interfered with your enjoyment of life?10  What number best describes how, during the past week, pain has interfered with your general activity?  10    Review of Pertinent Medical Data ---  MRI EXAMINATION OF THE LUMBAR SPINE WITH AND WITHOUT CONTRAST     HISTORY: Back pain, right radiculopathy. Previous lumbar surgery.     COMPARISON: None.     FINDINGS: The alignment of the lumbar spine is within normal limits.  Mild levoscoliosis of the lumbar spine is appreciated with the apex at  the level of L2-L3. The conus is at L1 and the caudal aspect of the  spinal cord appears unremarkable. The hemangioma involving the posterior  lateral aspect of L1 is appreciated to the right measuring approximately  2.3 cm in maximum transverse dimension.     T12-L1: A mild broad-based disc osteophyte complex is present with no  evidence of herniation.     L1-L2: A mild broad-based disc osteophyte complex is present resulting  in mild flattening of the ventral surface of the thecal sac.     L2-L3: A broad-based disc osteophyte complex is present resulting in  mild flattening of the ventral surface of the thecal sac.  Mild facet  degenerative disease is present bilaterally. Mild foraminal stenosis is  present bilaterally secondary to extension of a small disc  osteophyte  complex into the neural foramen.     L3-L4: A mild broad-based disc osteophyte complex is present as well as  mild facet degenerative disease bilaterally. The disc osteophyte complex  contributes to mild foraminal stenosis bilaterally, slightly more  prominent on the right where disc material approaches the right L3 nerve  as it exits the neural foramen.     L4-L5: A broad-based disc osteophyte complex is present which results in  mild canal stenosis and contributes to mild and moderate lateral recess  narrowing on the right and left respectively. A small annular tear is  present posterior laterally bilaterally. Mild foraminal stenosis is  present bilaterally, slightly more prominent on the left secondary to  loss of disc height and extension of a disc osteophyte complex into the  neural foramen.     L5-S1: Transitional anatomy is appreciated with partial sacralization of  L5. Mild facet degenerative disease is present bilaterally. There is no  evidence of disc bulge or herniation.     After contrast administration there was mild enhancement involving the  posterior aspect of the discs at L3-L4 and L4-L5.     IMPRESSION:  There is no evidence of a focal disc herniation. Multilevel  degenerative disease involving the lumbar spine is noted as described in  detail above including multilevel facet degenerative disease and  broad-based disc osteophyte complexes. This is most prominent at L3-L4  and L4-L5. At L3-L4 a disc osteophyte complex approaches the right L3  nerve as it exits the neural foramen. It does not definitively involve  the nerve. At L4-L5 there is mild canal stenosis and lateral recess  narrowing bilaterally, slightly more prominent on the left. See above.     This report was finalized on 9/27/2022 7:34 AM by Dr. Lupillo Monge M.D.    The following portions of the patient's history were reviewed and updated as appropriate: allergies, current medications, past family history, past medical  "history, past social history, past surgical history, and problem list.    Review of Systems   Constitutional:  Positive for activity change (decreased) and fatigue. Negative for chills and fever.   HENT:  Positive for congestion.    Eyes:  Negative for visual disturbance.   Respiratory:  Positive for shortness of breath (sometimes). Negative for chest tightness.    Cardiovascular:  Negative for chest pain.   Gastrointestinal:  Positive for constipation. Negative for abdominal pain and diarrhea.   Genitourinary:  Negative for difficulty urinating, dyspareunia and dysuria.   Musculoskeletal:  Positive for back pain.   Neurological:  Positive for weakness and headaches. Negative for dizziness, light-headedness and numbness.   Psychiatric/Behavioral:  Positive for agitation and sleep disturbance. Negative for self-injury and suicidal ideas. The patient is nervous/anxious.      I have reviewed and confirmed the accuracy of the ROS as documented by the MA/LPN/RN JERONIMO Johnson  Vitals:    05/21/25 1112   BP: 122/75   Pulse: 77   Temp: 97.3 °F (36.3 °C)   SpO2: 96%   Weight: 93.9 kg (207 lb)   Height: 162.6 cm (64\")   PainSc: 5    PainLoc: Back         Objective   Physical Exam  Vitals and nursing note reviewed. Exam conducted with a chaperone present (daughter in law).   Constitutional:       Appearance: Normal appearance.   HENT:      Head: Normocephalic.   Pulmonary:      Effort: Pulmonary effort is normal.   Musculoskeletal:      Thoracic back: Tenderness (tenderness to palpation over the bilateral thoracic facet joint spaces) present.      Lumbar back: Tenderness (moderate pain to palpaton over midline area) present. Decreased range of motion. Positive right straight leg raise test.        Back:    Skin:     General: Skin is warm and dry.   Neurological:      General: No focal deficit present.      Mental Status: She is alert and oriented to person, place, and time.      Cranial Nerves: Cranial nerves 2-12 are " intact.      Sensory: Sensation is intact.      Motor: Motor function is intact.      Gait: Gait is intact.   Psychiatric:         Mood and Affect: Mood normal.         Behavior: Behavior normal.         Thought Content: Thought content normal.         Judgment: Judgment normal.             Assessment & Plan   Diagnoses and all orders for this visit:    1. Lumbar radiculopathy (Primary)  -     MRI Lumbar Spine With & Without Contrast; Future  -     Ambulatory Referral to Physical Therapy for Evaluation & Treatment  -     pregabalin (LYRICA) 50 MG capsule; Take 1 capsule by mouth 3 (Three) Times a Day.  Dispense: 90 capsule; Refill: 0    2. Degeneration of intervertebral disc of lumbar region with discogenic back pain and lower extremity pain  -     Ambulatory Referral to Physical Therapy for Evaluation & Treatment  -     Ibuprofen 3 %, Gabapentin 10 %, Baclofen 2 %, lidocaine 4 %, Ketamine HCl 4 %; Apply 1-2 g topically to the appropriate area as directed 3 (Three) to 4 (Four) times daily.  Dispense: 90 g; Refill: 0  -     pregabalin (LYRICA) 50 MG capsule; Take 1 capsule by mouth 3 (Three) Times a Day.  Dispense: 90 capsule; Refill: 0    3. Thoracic degenerative disc disease  -     MRI Thoracic Spine Without Contrast; Future  -     Ibuprofen 3 %, Gabapentin 10 %, Baclofen 2 %, lidocaine 4 %, Ketamine HCl 4 %; Apply 1-2 g topically to the appropriate area as directed 3 (Three) to 4 (Four) times daily.  Dispense: 90 g; Refill: 0        Samina Marin reports a pain score of 5.  Given her pain assessment as noted, treatment options were discussed and the following options were decided upon as a follow-up plan to address the patient's pain: continuation of current treatment plan for pain, prescription for non-opiod analgesics, and use of non-medical modalities (ice, heat, stretching and/or behavior modifications).    PHQ-2 Depression Screening  Little interest or pleasure in doing things? More than half the days    Feeling down, depressed, or hopeless? Nearly every day (depressed)   PHQ-2 Total Score 5     PHQ-9 Depression Screening  Little interest or pleasure in doing things? More than half the days   Feeling down, depressed, or hopeless? Nearly every day (depressed)   PHQ-2 Total Score 5   Trouble falling or staying asleep, or sleeping too much? More than half the days   Feeling tired or having little energy? Nearly every day   Poor appetite or overeating? More than half the days (poor appetite)   Feeling bad about yourself - or that you are a failure or have let yourself or your family down? Not at all   Trouble concentrating on things, such as reading the newspaper or watching television? Nearly every day   Moving or speaking so slowly that other people could have noticed? Or the opposite - being so fidgety or restless that you have been moving around a lot more than usual? Nearly every day     Thoughts that you would be better off dead, or of hurting yourself in some way? Not at all   PHQ-9 Total Score 18   If you checked off any problems, how difficult have these problems made it for you to do your work, take care of things at home, or get along with other people? Very difficult         --- Patient screened positive for depression based on a PHQ-9 score of 18 on 5/21/2025. Follow-up recommendations include: Prescribed antidepressant medication treatment.      --- I have discussed with the patient and her daughter-in-law that due to the worsening of right lower extremity pain I recommend updating MRI of the lumbar spine with and without contrast for further evaluation of disc herniation and/or nerve root impingement.  Will also have the patient undergo MRI of the thoracic spine for progressive worsening of thoracic spine pain.  --- Patient failed previous trial of gabapentin therefore we will institute a trial of pregabalin 50 mg with a slow titration schedule to twice daily dosing.  ----The patient is not interested  in any further interventional pain procedures therefore we will discuss pending the results of the MRI either surgical evaluation and/or consideration of neuromodulation.  Provided the patient with education materials for her perusal on today.         KRISTEN REPORT  As part of the patient's treatment plan, I am prescribing controlled substances. The patient has been made aware of appropriate use of such medications, including potential risk of somnolence, limited ability to drive and/or work safely, and the potential for dependence or overdose. It has also been made clear that these medications are for use by this patient only, without concomitant use of alcohol or other substances unless prescribed.     Patient has completed prescribing agreement detailing terms of continued prescribing of controlled substances, including monitoring KRISTEN reports, urine drug screening, and pill counts if necessary. The patient is aware that inappropriate use will results in cessation of prescribing such medications.    As the clinician, I personally reviewed the KRISTEN from 5/21/2025 while the patient was in the office today.    History and physical exam exhibit continued safe and appropriate use of controlled substances.     Dictated utilizing Dragon dictation.

## 2025-05-22 PROBLEM — M51.34 THORACIC DEGENERATIVE DISC DISEASE: Status: ACTIVE | Noted: 2025-05-22

## 2025-06-04 RX ORDER — ATORVASTATIN CALCIUM 40 MG/1
40 TABLET, FILM COATED ORAL
Qty: 90 TABLET | Refills: 3 | Status: SHIPPED | OUTPATIENT
Start: 2025-06-04

## 2025-06-21 ENCOUNTER — HOSPITAL ENCOUNTER (OUTPATIENT)
Facility: HOSPITAL | Age: 75
Setting detail: OBSERVATION
Discharge: HOME | End: 2025-06-23
Attending: EMERGENCY MEDICINE | Admitting: EMERGENCY MEDICINE
Payer: MEDICARE

## 2025-06-21 ENCOUNTER — APPOINTMENT (OUTPATIENT)
Dept: CT IMAGING | Facility: HOSPITAL | Age: 75
End: 2025-06-21
Payer: MEDICARE

## 2025-06-21 DIAGNOSIS — M54.16 LUMBAR RADICULOPATHY: Primary | ICD-10-CM

## 2025-06-21 DIAGNOSIS — H43.399 VITREOUS FLOATERS, UNSPECIFIED LATERALITY: ICD-10-CM

## 2025-06-21 PROBLEM — M54.50 LOW BACK PAIN: Status: ACTIVE | Noted: 2025-06-21

## 2025-06-21 LAB
ALBUMIN SERPL-MCNC: 3.7 G/DL (ref 3.5–5.2)
ALBUMIN/GLOB SERPL: 1.3 G/DL
ALP SERPL-CCNC: 123 U/L (ref 39–117)
ALT SERPL W P-5'-P-CCNC: 6 U/L (ref 1–33)
ANION GAP SERPL CALCULATED.3IONS-SCNC: 10 MMOL/L (ref 5–15)
AST SERPL-CCNC: 16 U/L (ref 1–32)
BASOPHILS # BLD AUTO: 0.03 10*3/MM3 (ref 0–0.2)
BASOPHILS NFR BLD AUTO: 0.4 % (ref 0–1.5)
BILIRUB SERPL-MCNC: 0.7 MG/DL (ref 0–1.2)
BUN SERPL-MCNC: 14 MG/DL (ref 8–23)
BUN/CREAT SERPL: 14.7 (ref 7–25)
CALCIUM SPEC-SCNC: 8.9 MG/DL (ref 8.6–10.5)
CHLORIDE SERPL-SCNC: 102 MMOL/L (ref 98–107)
CO2 SERPL-SCNC: 26 MMOL/L (ref 22–29)
CREAT SERPL-MCNC: 0.95 MG/DL (ref 0.57–1)
DEPRECATED RDW RBC AUTO: 41.3 FL (ref 37–54)
EGFRCR SERPLBLD CKD-EPI 2021: 63 ML/MIN/1.73
EOSINOPHIL # BLD AUTO: 0.14 10*3/MM3 (ref 0–0.4)
EOSINOPHIL NFR BLD AUTO: 1.9 % (ref 0.3–6.2)
ERYTHROCYTE [DISTWIDTH] IN BLOOD BY AUTOMATED COUNT: 13.4 % (ref 12.3–15.4)
GLOBULIN UR ELPH-MCNC: 2.8 GM/DL
GLUCOSE SERPL-MCNC: 150 MG/DL (ref 65–99)
HCT VFR BLD AUTO: 37.1 % (ref 34–46.6)
HGB BLD-MCNC: 11.4 G/DL (ref 12–15.9)
IMM GRANULOCYTES # BLD AUTO: 0.01 10*3/MM3 (ref 0–0.05)
IMM GRANULOCYTES NFR BLD AUTO: 0.1 % (ref 0–0.5)
INR PPP: 1.07 (ref 0.9–1.1)
LYMPHOCYTES # BLD AUTO: 1.9 10*3/MM3 (ref 0.7–3.1)
LYMPHOCYTES NFR BLD AUTO: 26.4 % (ref 19.6–45.3)
MAGNESIUM SERPL-MCNC: 2 MG/DL (ref 1.6–2.4)
MCH RBC QN AUTO: 26.5 PG (ref 26.6–33)
MCHC RBC AUTO-ENTMCNC: 30.7 G/DL (ref 31.5–35.7)
MCV RBC AUTO: 86.1 FL (ref 79–97)
MONOCYTES # BLD AUTO: 0.45 10*3/MM3 (ref 0.1–0.9)
MONOCYTES NFR BLD AUTO: 6.3 % (ref 5–12)
NEUTROPHILS NFR BLD AUTO: 4.66 10*3/MM3 (ref 1.7–7)
NEUTROPHILS NFR BLD AUTO: 64.9 % (ref 42.7–76)
NRBC BLD AUTO-RTO: 0 /100 WBC (ref 0–0.2)
PLATELET # BLD AUTO: 226 10*3/MM3 (ref 140–450)
PMV BLD AUTO: 9.8 FL (ref 6–12)
POTASSIUM SERPL-SCNC: 3.9 MMOL/L (ref 3.5–5.2)
PROT SERPL-MCNC: 6.5 G/DL (ref 6–8.5)
PROTHROMBIN TIME: 13.9 SECONDS (ref 11.7–14.2)
RBC # BLD AUTO: 4.31 10*6/MM3 (ref 3.77–5.28)
SODIUM SERPL-SCNC: 138 MMOL/L (ref 136–145)
WBC NRBC COR # BLD AUTO: 7.19 10*3/MM3 (ref 3.4–10.8)

## 2025-06-21 PROCEDURE — 85025 COMPLETE CBC W/AUTO DIFF WBC: CPT | Performed by: EMERGENCY MEDICINE

## 2025-06-21 PROCEDURE — G0378 HOSPITAL OBSERVATION PER HR: HCPCS

## 2025-06-21 PROCEDURE — 96374 THER/PROPH/DIAG INJ IV PUSH: CPT

## 2025-06-21 PROCEDURE — 83735 ASSAY OF MAGNESIUM: CPT | Performed by: EMERGENCY MEDICINE

## 2025-06-21 PROCEDURE — 99285 EMERGENCY DEPT VISIT HI MDM: CPT

## 2025-06-21 PROCEDURE — 80053 COMPREHEN METABOLIC PANEL: CPT | Performed by: EMERGENCY MEDICINE

## 2025-06-21 PROCEDURE — 85610 PROTHROMBIN TIME: CPT | Performed by: EMERGENCY MEDICINE

## 2025-06-21 PROCEDURE — 70450 CT HEAD/BRAIN W/O DYE: CPT

## 2025-06-21 PROCEDURE — 25010000002 KETOROLAC TROMETHAMINE PER 15 MG: Performed by: EMERGENCY MEDICINE

## 2025-06-21 RX ORDER — SODIUM CHLORIDE 9 MG/ML
40 INJECTION, SOLUTION INTRAVENOUS AS NEEDED
Status: DISCONTINUED | OUTPATIENT
Start: 2025-06-21 | End: 2025-06-23 | Stop reason: HOSPADM

## 2025-06-21 RX ORDER — METOPROLOL TARTRATE 25 MG/1
25 TABLET, FILM COATED ORAL 2 TIMES DAILY
Status: DISCONTINUED | OUTPATIENT
Start: 2025-06-21 | End: 2025-06-23 | Stop reason: HOSPADM

## 2025-06-21 RX ORDER — SODIUM CHLORIDE 0.9 % (FLUSH) 0.9 %
10 SYRINGE (ML) INJECTION EVERY 12 HOURS SCHEDULED
Status: DISCONTINUED | OUTPATIENT
Start: 2025-06-21 | End: 2025-06-23 | Stop reason: HOSPADM

## 2025-06-21 RX ORDER — PANTOPRAZOLE SODIUM 40 MG/1
40 TABLET, DELAYED RELEASE ORAL
Status: DISCONTINUED | OUTPATIENT
Start: 2025-06-22 | End: 2025-06-23 | Stop reason: HOSPADM

## 2025-06-21 RX ORDER — SODIUM CHLORIDE 0.9 % (FLUSH) 0.9 %
10 SYRINGE (ML) INJECTION AS NEEDED
Status: DISCONTINUED | OUTPATIENT
Start: 2025-06-21 | End: 2025-06-23 | Stop reason: HOSPADM

## 2025-06-21 RX ORDER — LIDOCAINE 4 G/G
1 PATCH TOPICAL ONCE
Status: COMPLETED | OUTPATIENT
Start: 2025-06-21 | End: 2025-06-22

## 2025-06-21 RX ORDER — ACETAMINOPHEN 500 MG
1000 TABLET ORAL ONCE
Status: COMPLETED | OUTPATIENT
Start: 2025-06-21 | End: 2025-06-21

## 2025-06-21 RX ORDER — OXYBUTYNIN CHLORIDE 10 MG/1
10 TABLET, EXTENDED RELEASE ORAL DAILY
Status: DISCONTINUED | OUTPATIENT
Start: 2025-06-22 | End: 2025-06-23 | Stop reason: HOSPADM

## 2025-06-21 RX ORDER — DULOXETIN HYDROCHLORIDE 60 MG/1
60 CAPSULE, DELAYED RELEASE ORAL DAILY
Status: DISCONTINUED | OUTPATIENT
Start: 2025-06-22 | End: 2025-06-23 | Stop reason: HOSPADM

## 2025-06-21 RX ORDER — ATORVASTATIN CALCIUM 20 MG/1
40 TABLET, FILM COATED ORAL NIGHTLY
Status: DISCONTINUED | OUTPATIENT
Start: 2025-06-21 | End: 2025-06-23 | Stop reason: HOSPADM

## 2025-06-21 RX ORDER — ONDANSETRON 2 MG/ML
4 INJECTION INTRAMUSCULAR; INTRAVENOUS EVERY 6 HOURS PRN
Status: DISCONTINUED | OUTPATIENT
Start: 2025-06-21 | End: 2025-06-23 | Stop reason: HOSPADM

## 2025-06-21 RX ORDER — LIDOCAINE 4 G/G
2 PATCH TOPICAL EVERY 24 HOURS
Status: DISCONTINUED | OUTPATIENT
Start: 2025-06-22 | End: 2025-06-23 | Stop reason: HOSPADM

## 2025-06-21 RX ORDER — ONDANSETRON 4 MG/1
4 TABLET, ORALLY DISINTEGRATING ORAL EVERY 6 HOURS PRN
Status: DISCONTINUED | OUTPATIENT
Start: 2025-06-21 | End: 2025-06-23 | Stop reason: HOSPADM

## 2025-06-21 RX ORDER — ASPIRIN 81 MG/1
81 TABLET, CHEWABLE ORAL DAILY
Status: DISCONTINUED | OUTPATIENT
Start: 2025-06-22 | End: 2025-06-23 | Stop reason: HOSPADM

## 2025-06-21 RX ORDER — PREGABALIN 50 MG/1
50 CAPSULE ORAL 3 TIMES DAILY
Status: DISCONTINUED | OUTPATIENT
Start: 2025-06-21 | End: 2025-06-23 | Stop reason: HOSPADM

## 2025-06-21 RX ORDER — AMLODIPINE BESYLATE 10 MG/1
10 TABLET ORAL EVERY MORNING
Status: DISCONTINUED | OUTPATIENT
Start: 2025-06-22 | End: 2025-06-23 | Stop reason: HOSPADM

## 2025-06-21 RX ORDER — KETOROLAC TROMETHAMINE 15 MG/ML
15 INJECTION, SOLUTION INTRAMUSCULAR; INTRAVENOUS ONCE
Status: COMPLETED | OUTPATIENT
Start: 2025-06-21 | End: 2025-06-21

## 2025-06-21 RX ADMIN — PREGABALIN 50 MG: 50 CAPSULE ORAL at 23:42

## 2025-06-21 RX ADMIN — KETOROLAC TROMETHAMINE 15 MG: 15 INJECTION INTRAMUSCULAR at 19:18

## 2025-06-21 RX ADMIN — Medication 10 ML: at 22:29

## 2025-06-21 RX ADMIN — ATORVASTATIN CALCIUM 40 MG: 20 TABLET, FILM COATED ORAL at 23:42

## 2025-06-21 RX ADMIN — ACETAMINOPHEN 1000 MG: 500 TABLET, FILM COATED ORAL at 19:18

## 2025-06-21 RX ADMIN — LIDOCAINE 1 PATCH: 4 PATCH TOPICAL at 19:18

## 2025-06-21 NOTE — ED NOTES
Patient to ER via car from home for back x a few months   Patient has MRI scheduled    Today aprox 45 minutes patient reports she began seeing black spots    No other s/s of stroke at time of triage    Patient states at this moment she is not seeing black spots

## 2025-06-21 NOTE — ED PROVIDER NOTES
" EMERGENCY DEPARTMENT ENCOUNTER    Room Number:  119/1  Date of encounter:  6/21/2025  PCP: Jonelle Joseph APRN  Historian: Patient, son and daughter-in-law  Relevant information and history provided by sources other than the patient will be included below and in the ED Course.  Review of pertinent past medical records may also be included in record below and ED Course.    HPI:  Chief Complaint: \"My back is hurting\"  A complete HPI/ROS/PMH/PSH/SH/FH are unobtainable due to: Not applicable  Context: Samina Marin is a 74 y.o. female who presents to the ED c/o this patient comes to the emergency department because of worsening of her chronic back pain.  She has been suffering from back pain for over 3 years she has seen her primary care physician and is seeing pain management for it.  Her pain will start in the lower thoracic area and go all the way to the lumbar area it is usually in the lower portion of her back.  She has had back surgery in the very distant past maybe over 35 years ago.  She does have a right foot drop and weakness that she states is chronic.  She recently started Lyrica as prescribed by her pain management doctor.  This is not helped.  Her pain does radiate down the left side of her leg and stops at her knee.  It does not follow a specific dermatomal pattern.  She has no saddle sensation changes, urinary or fecal incontinence or retention and no new weakness to lower extremities.  The pain is worse when she moves such as getting up standing and walking.  She has had no fevers or chills.  No abdominal pain, chest pain or shortness of breath.  She is not supposed to get an MRI till mid July.  And comes here because of worsening pain.  The family mentioned today when they were at [facility] outside that the patient saw this brown squiggly line in spots and she was reaching for it.  Described as a brown spot.  She felt as if it was moving and she was trying to grab it.  Did not have any vision " impairment as a visual loss.  This persisted and resolved when she arrived to the emergency department.  Patient does remember she states that it felt like it was a little brown spot that she was reaching.  She is unaware if it was in just 1 eye or both eyes.  She does have a history of floaters but her floaters were different than this.  She had no eye pain.  She had no speech change.  No weakness to arms or legs that is new.  There is report that she did see a neurologist in the past and was diagnosed with mini strokes because she had a CT scan that showed that she had an old stroke.  She has never had any neurologic symptoms of a stroke in the past.        Previous Episodes: Long history of back pain and is gradually worsening.  Current Symptoms: See above    MEDICAL HISTORY REVIEWED  Today they had to have an MRI until July patient I can see in looking at old records has a history of lumbar radiculopathy and is seeing pain management I reviewed the note from 5/21/2025.  Patient is on Lyrica.  It appears if an MRI has been ordered in the future that she is having creasing and is not scheduled until July.  Has degenerative intervertebral compound disc disease in the lumbar region.  She is also on gabapentin, lidocaine patches, baclofen and Motrin.  Patient started Lyrica but she was      PAST MEDICAL HISTORY  Active Ambulatory Problems     Diagnosis Date Noted    Allergic rhinitis 08/26/2021    Aortic valve regurgitation 04/28/2021    Arthritis 08/26/2021    Asthma 08/26/2021    Carpal tunnel syndrome, bilateral 08/26/2021    DDD (degenerative disc disease), lumbar 08/26/2021    Esophageal reflux 08/26/2021    Essential hypertension 04/28/2021    Fibromyalgia 03/05/2018    Major depressive disorder 03/05/2018    Mixed stress and urge urinary incontinence 08/26/2021    Mood disorder 08/26/2021    Obesity 08/26/2021    Paroxysmal supraventricular tachycardia 04/28/2021    Postmenopausal 08/26/2021    Shortness of  breath 10/29/2012    Skin cancer 08/26/2021    Vitamin D deficiency 08/26/2021    Angina pectoris 12/02/2021    Lumbar radiculopathy 10/12/2022    Trochanteric bursitis of right hip 10/12/2022    Muscle spasm 10/12/2022    Thoracic spine pain 12/05/2022    Lumbar facet arthropathy 12/05/2022    Closed head injury 07/19/2023    Migraine with aura and without status migrainosus 07/21/2023    Pain in joint of left shoulder 10/18/2023    Thoracic degenerative disc disease 05/22/2025     Resolved Ambulatory Problems     Diagnosis Date Noted    Mixed hyperlipidemia 04/28/2021    Acute focal neurological deficit 07/18/2023    Stroke-like symptoms 07/19/2023     Past Medical History:   Diagnosis Date    Anxiety and depression     Chronic bronchitis     Concussion 2023    CVA (cerebral vascular accident)     Eczema     GERD (gastroesophageal reflux disease)     Hypertension     Perianal abscess     Sinus bradycardia     Stroke 2023    Urinary incontinence          PAST SURGICAL HISTORY  Past Surgical History:   Procedure Laterality Date    BACK SURGERY      lumbar    CARDIAC CATHETERIZATION N/A 12/09/2021    Procedure: Left Heart Cath;  Surgeon: Marily Maya MD;  Location: [facility] INVAS[facility] LOCATION;  Service: Cardiology;  Laterality: N/A;    CARDIAC CATHETERIZATION N/A 12/09/2021    Procedure: Coronary angiography;  Surgeon: Marily Maya MD;  [facility];  Service: Cardiology;  Laterality: N/A;    CARDIAC CATHETERIZATION N/A 12/09/2021    Procedure: Left ventriculography;  Surgeon: Marily Maya MD;  [facility];  Service: Cardiology;  Laterality: N/A;    CARDIAC ELECTROPHYSIOLOGY STUDY AND ABLATION      CATARACT EXTRACTION EXTRACAPSULAR W/ INTRAOCULAR LENS IMPLANTATION Bilateral     COLONOSCOPY      ENDOSCOPY      EPIDURAL Right 11/07/2022    Procedure: LUMBAR/SACRAL TRANSFORAMINAL EPIDURAL RIGHT L4, L5;  Surgeon: Jonelle Abraham MD;  Location: [facility]  MAIN OR;  Service: Pain Management;  Laterality: Right;    HYSTERECTOMY      MEDIAL BRANCH BLOCK Bilateral 01/04/2023    Procedure: LUMBAR MEDIAL BRANCH BLOCK BILATERAL L3-L5 #1;  Surgeon: Jonelle Abraham MD;  [place] [facility] [place];  Service: Pain Management;  Laterality: Bilateral;    ROTATOR CUFF REPAIR Right     SHOULDER ARTHROSCOPY W/ ROTATOR CUFF REPAIR Left 12/28/2023    Procedure: LEFT SHOULDER ARTHROSCOPY WITH ROTATOR CUFF REPAIR BICEP TENODESIS EXTENSIVE DEBRIDEMENT ACROMIOPLASTY DISTIAL CLAVICAL EXCISION;  Surgeon: Yosvany Stock MD;  [place]  [facility];  Service: Orthopedics;  Laterality: Left;    TENDON REPAIR Right     SHOULDER         FAMILY HISTORY  Family History   Problem Relation Age of Onset    Heart disease Sister     Ovarian cancer Sister     Liver cancer Sister     Hypertension Sister     Pancreatic cancer Sister     Heart disease Brother     Malig Hyperthermia Neg Hx          SOCIAL HISTORY  Social History     Socioeconomic History    Marital status:    Tobacco Use    Smoking status: Never     Passive exposure: Never    Smokeless tobacco: Never    Tobacco comments:     CAFFEINE USE   Vaping Use    Vaping status: Never Used   Substance and Sexual Activity    Alcohol use: Yes     Comment: 5 drinks yearly    Drug use: Never    Sexual activity: Defer         ALLERGIES  Meperidine        REVIEW OF SYSTEMS  Review of Systems     All systems reviewed and negative except for those discussed in HPI.       PHYSICAL EXAM    I have reviewed the triage vital signs and nursing notes.    ED Triage Vitals   Temp Heart Rate Resp BP SpO2   06/21/25 1837 06/21/25 1813 06/21/25 1813 06/21/25 1816 06/21/25 1813   98.8 °F (37.1 °C) 72 16 99/44 91 %      Temp src Heart Rate Source Patient Position BP Location FiO2 (%)   06/21/25 1837 -- -- -- --   Oral           GENERAL: Elderly female.  No acute distress.Vital signs on my initial evaluation vitals unremarkable  HENT: nares patent  Head/neck/ face are  symmetric without gross deformity, signs of trauma, or swelling  EYES: no scleral icterus, no conjunctival pallor.  Equal round reactive to light extraocular muscles intact.  No vision impairment no visual field deficit.  NECK: Supple, no meningismus  CV: regular rhythm, regular rate with intact distal pulses.  RESPIRATORY: normal effort and no respiratory distress.  Clear to auscultation bilaterally  ABDOMEN: soft and nontender.  Obese.  No pain on diffuse abdominal exam  Back exam her location of pain is in the lower portion of her back in the lower lumbar sacral area.  Has some radiation down the left leg to the knee.  Normal saddle sensation.  Her pain is reproducible when she goes to sit up when she goes to try to stand and ambulate.  MUSCULOSKELETAL: 5/5 strength to hip flexion, knee extension/flexion, dorsiflexion, plantarflexion, and EHL other than mild weakness in the right foot flexion and extension which is chronic.  No pain with bilateral external and internal rotation of hips.  Intact distal pulses with no cyanosis, pallor, or temperature change on palpation. Normal inspection and palpation with soft compartments.  SILT at bilateral superficial peroneal, deep peroneal, sural, and saphenous nerves  Positive straight leg on the right.  NEURO: alert and appropriate, moves all extremities, follows commands.  NIH stroke scale is 0.  No focal motor or sensory changes other than the chronic weakness of her right foot and ankle  SKIN: warm, dry    Vital signs and nursing notes reviewed.        LAB RESULTS  Recent Results (from the past 24 hours)   Comprehensive Metabolic Panel    Collection Time: 06/21/25  7:15 PM    Specimen: Blood   Result Value Ref Range    Glucose 150 (H) 65 - 99 mg/dL    BUN 14.0 8.0 - 23.0 mg/dL    Creatinine 0.95 0.57 - 1.00 mg/dL    Sodium 138 136 - 145 mmol/L    Potassium 3.9 3.5 - 5.2 mmol/L    Chloride 102 98 - 107 mmol/L    CO2 26.0 22.0 - 29.0 mmol/L    Calcium 8.9 8.6 - 10.5 mg/dL     Total Protein 6.5 6.0 - 8.5 g/dL    Albumin 3.7 3.5 - 5.2 g/dL    ALT (SGPT) 6 1 - 33 U/L    AST (SGOT) 16 1 - 32 U/L    Alkaline Phosphatase 123 (H) 39 - 117 U/L    Total Bilirubin 0.7 0.0 - 1.2 mg/dL    Globulin 2.8 gm/dL    A/G Ratio 1.3 g/dL    BUN/Creatinine Ratio 14.7 7.0 - 25.0    Anion Gap 10.0 5.0 - 15.0 mmol/L    eGFR 63.0 >60.0 mL/min/1.73   Protime-INR    Collection Time: 06/21/25  7:15 PM    Specimen: Blood   Result Value Ref Range    Protime 13.9 11.7 - 14.2 Seconds    INR 1.07 0.90 - 1.10   Magnesium    Collection Time: 06/21/25  7:15 PM    Specimen: Blood   Result Value Ref Range    Magnesium 2.0 1.6 - 2.4 mg/dL   CBC Auto Differential    Collection Time: 06/21/25  7:15 PM    Specimen: Blood   Result Value Ref Range    WBC 7.19 3.40 - 10.80 10*3/mm3    RBC 4.31 3.77 - 5.28 10*6/mm3    Hemoglobin 11.4 (L) 12.0 - 15.9 g/dL    Hematocrit 37.1 34.0 - 46.6 %    MCV 86.1 79.0 - 97.0 fL    MCH 26.5 (L) 26.6 - 33.0 pg    MCHC 30.7 (L) 31.5 - 35.7 g/dL    RDW 13.4 12.3 - 15.4 %    RDW-SD 41.3 37.0 - 54.0 fl    MPV 9.8 6.0 - 12.0 fL    Platelets 226 140 - 450 10*3/mm3    Neutrophil % 64.9 42.7 - 76.0 %    Lymphocyte % 26.4 19.6 - 45.3 %    Monocyte % 6.3 5.0 - 12.0 %    Eosinophil % 1.9 0.3 - 6.2 %    Basophil % 0.4 0.0 - 1.5 %    Immature Grans % 0.1 0.0 - 0.5 %    Neutrophils, Absolute 4.66 1.70 - 7.00 10*3/mm3    Lymphocytes, Absolute 1.90 0.70 - 3.10 10*3/mm3    Monocytes, Absolute 0.45 0.10 - 0.90 10*3/mm3    Eosinophils, Absolute 0.14 0.00 - 0.40 10*3/mm3    Basophils, Absolute 0.03 0.00 - 0.20 10*3/mm3    Immature Grans, Absolute 0.01 0.00 - 0.05 10*3/mm3    nRBC 0.0 0.0 - 0.2 /100 WBC       Ordered the above labs and independently reviewed the results.        RADIOLOGY  CT Head Without Contrast  Result Date: 6/21/2025  CT OF THE HEAD WITHOUT CONTRAST  HISTORY: Visual disturbances  COMPARISON: None available.  TECHNIQUE: Axial CT imaging was obtained through the brain. No IV contrast was  administered.  FINDINGS: No acute intracranial hemorrhage is seen. There is diffuse atrophy. There is periventricular and deep white matter microangiopathic change. There is no midline shift or mass effect. There is minimal mucosal thickening noted within the ethmoid sinuses. Trace fluid is noted within the left mastoid air cells. Orbits appear unremarkable.      No acute intracranial findings.  Radiation dose reduction techniques were utilized, including automated exposure control and exposure modulation based on body size.   This report was finalized on 6/21/2025 8:26 PM by Dr. Tayler Brooks M.D on Workstation: [id number]        I ordered the above noted radiological studies. Reviewed by me and discussed with radiologist.  See dictation for official radiology interpretation.      PROCEDURES    Procedures      MEDICATIONS GIVEN IN ER    Medications   sodium chloride 0.9 % flush 10 mL (has no administration in time range)   Lidocaine 4 % 1 patch (1 patch Transdermal Medication Applied 6/21/25 1918)   sodium chloride 0.9 % flush 10 mL (10 mL Intravenous Given 6/21/25 2229)   sodium chloride 0.9 % flush 10 mL (has no administration in time range)   sodium chloride 0.9 % infusion 40 mL (has no administration in time range)   ondansetron ODT (ZOFRAN-ODT) disintegrating tablet 4 mg (has no administration in time range)     Or   ondansetron (ZOFRAN) injection 4 mg (has no administration in time range)   aspirin chewable tablet 81 mg (has no administration in time range)   atorvastatin (LIPITOR) tablet 40 mg (has no administration in time range)   DULoxetine (CYMBALTA) DR capsule 60 mg (has no administration in time range)   pantoprazole (PROTONIX) EC tablet 40 mg (has no administration in time range)   Lidocaine 4 % 2 patch (has no administration in time range)   metoprolol tartrate (LOPRESSOR) tablet 25 mg (has no administration in time range)   pregabalin (LYRICA) capsule 50 mg (has no administration in time  range)   oxybutynin XL (DITROPAN-XL) 24 hr tablet 10 mg (has no administration in time range)   amLODIPine (NORVASC) tablet 10 mg (has no administration in time range)   ketorolac (TORADOL) injection 15 mg (15 mg Intravenous Given 6/21/25 1918)   acetaminophen (TYLENOL) tablet 1,000 mg (1,000 mg Oral Given 6/21/25 1918)         All labs have been independently reviewed by me.  All radiology studies have been reviewed by me and I discussed with radiologist dictating the report when indicated below.  All EKG's independently viewed and interpreted by me.  Discussion below represents my analysis of pertinent findings related to patient's condition, differential diagnosis, treatment plan and final disposition.        PROGRESS, DATA ANALYSIS, CONSULTS, AND MEDICAL DECISION MAKING    This is a patient that presents with worsening of her lumbar pain.  She does not want narcotics as they cause her to sleep and she is very sensitive to them.  I will try Lidoderm patch, Tylenol, Toradol at this time.  I think very unlikely she had any stroke with the brown spot that she saw.  It seems very consistent with a potential floater.  I will do a CT scan of her head.  Will then check lab work.  Will see how she responds to pain medicine.  All questions answered at this time.      ED Course as of 06/21/25 2304   Sat Jun 21, 2025 2107 I reviewed the CT scan report from radiologist: CT scan of the head showed diffuse atrophy there is some microangiopathic changes no midline shift no acute abnormality seen.  Please see complete dictated report from radiologist. [MM]   2119 I did discuss the case with Jovana who is the observation unit provider.  Informed her of the patient's pending symptoms and results of the test.  She agrees to admit the patient to the observation unit. [MM]   2121 Talked with the patient as well as the family at length at bedside.  All questions answered.  They agree with the plan for admission and to obtain an MRI.  [MM]      ED Course User Index  [MM] Luis Enrique Valencia MD       AS OF 23:04 EDT VITALS:    BP - 107/53  HR - 68  TEMP - 98.4 °F (36.9 °C) (Oral)  02 SATS - 95%    SOCIAL DETERMINANTS OF HEALTH THAT IMPACT OR LIMIT CARE (For example..Homelessness,safe discharge, inability to obtain care, follow up, or prescriptions):      DIAGNOSIS  Final diagnoses:   Lumbar radiculopathy   Vitreous floaters, unspecified laterality         DISPOSITION  Insert admit to observation unit.          DICTATED UTILIZING DRAGON DICTATION    Note Disclaimer: At [facility], we believe that sharing information builds trust and better relationships. You are receiving this note because you recently visited [facility]. It is possible you will see health information before a provider has talked with you about it. This kind of information can be easy to misunderstand. To help you fully understand what it means for your health, we urge you to discuss this note with your provider.       Luis Enrique Valencia MD  06/21/25 8416

## 2025-06-22 ENCOUNTER — APPOINTMENT (OUTPATIENT)
Dept: MRI IMAGING | Facility: HOSPITAL | Age: 75
End: 2025-06-22
Payer: MEDICARE

## 2025-06-22 PROCEDURE — 96375 TX/PRO/DX INJ NEW DRUG ADDON: CPT

## 2025-06-22 PROCEDURE — 72146 MRI CHEST SPINE W/O DYE: CPT

## 2025-06-22 PROCEDURE — G0378 HOSPITAL OBSERVATION PER HR: HCPCS

## 2025-06-22 PROCEDURE — 96376 TX/PRO/DX INJ SAME DRUG ADON: CPT

## 2025-06-22 PROCEDURE — 72148 MRI LUMBAR SPINE W/O DYE: CPT

## 2025-06-22 PROCEDURE — 25010000002 HYDROCORTISONE SOD SUC (PF) 250 MG RECONSTITUTED SOLUTION: Performed by: EMERGENCY MEDICINE

## 2025-06-22 PROCEDURE — 99215 OFFICE O/P EST HI 40 MIN: CPT | Performed by: NURSE PRACTITIONER

## 2025-06-22 PROCEDURE — 76014 MR SFTY IMPLT&/FB ASMT STF 1: CPT

## 2025-06-22 RX ORDER — METHOCARBAMOL 750 MG/1
750 TABLET, FILM COATED ORAL EVERY 8 HOURS SCHEDULED
Status: DISCONTINUED | OUTPATIENT
Start: 2025-06-22 | End: 2025-06-23 | Stop reason: HOSPADM

## 2025-06-22 RX ORDER — HYDROCODONE BITARTRATE AND ACETAMINOPHEN 5; 325 MG/1; MG/1
1 TABLET ORAL EVERY 6 HOURS PRN
Refills: 0 | Status: DISCONTINUED | OUTPATIENT
Start: 2025-06-22 | End: 2025-06-23 | Stop reason: HOSPADM

## 2025-06-22 RX ADMIN — PREGABALIN 50 MG: 50 CAPSULE ORAL at 20:06

## 2025-06-22 RX ADMIN — OXYBUTYNIN CHLORIDE 10 MG: 10 TABLET, EXTENDED RELEASE ORAL at 08:58

## 2025-06-22 RX ADMIN — PANTOPRAZOLE SODIUM 40 MG: 40 TABLET, DELAYED RELEASE ORAL at 08:52

## 2025-06-22 RX ADMIN — ATORVASTATIN CALCIUM 40 MG: 20 TABLET, FILM COATED ORAL at 20:07

## 2025-06-22 RX ADMIN — Medication 10 ML: at 20:13

## 2025-06-22 RX ADMIN — HYDROCORTISONE SODIUM SUCCINATE 250 MG: 250 INJECTION, POWDER, FOR SOLUTION INTRAMUSCULAR; INTRAVENOUS at 09:48

## 2025-06-22 RX ADMIN — HYDROCORTISONE SODIUM SUCCINATE 250 MG: 250 INJECTION, POWDER, FOR SOLUTION INTRAMUSCULAR; INTRAVENOUS at 21:11

## 2025-06-22 RX ADMIN — DULOXETINE 60 MG: 60 CAPSULE, DELAYED RELEASE ORAL at 08:53

## 2025-06-22 RX ADMIN — METHOCARBAMOL TABLETS 750 MG: 750 TABLET, COATED ORAL at 14:01

## 2025-06-22 RX ADMIN — METHOCARBAMOL TABLETS 750 MG: 750 TABLET, COATED ORAL at 21:11

## 2025-06-22 RX ADMIN — HYDROCODONE BITARTRATE AND ACETAMINOPHEN 1 TABLET: 5; 325 TABLET ORAL at 08:53

## 2025-06-22 RX ADMIN — METOPROLOL TARTRATE 25 MG: 25 TABLET, FILM COATED ORAL at 08:52

## 2025-06-22 RX ADMIN — PREGABALIN 50 MG: 50 CAPSULE ORAL at 16:11

## 2025-06-22 RX ADMIN — HYDROCORTISONE SODIUM SUCCINATE 250 MG: 250 INJECTION, POWDER, FOR SOLUTION INTRAMUSCULAR; INTRAVENOUS at 16:10

## 2025-06-22 RX ADMIN — LIDOCAINE 2 PATCH: 4 PATCH TOPICAL at 20:06

## 2025-06-22 RX ADMIN — PREGABALIN 50 MG: 50 CAPSULE ORAL at 08:53

## 2025-06-22 RX ADMIN — Medication 10 ML: at 08:55

## 2025-06-22 RX ADMIN — ASPIRIN 81 MG CHEWABLE TABLET 81 MG: 81 TABLET CHEWABLE at 08:53

## 2025-06-22 NOTE — H&P
" [facility]   HISTORY AND PHYSICAL    Patient Name: Samina Marin  : 1950  MRN: [id number]  Primary Care Physician:  Jonelle Joseph APRN  Date of admission: 2025    Subjective   Subjective     Chief Complaint:   Chief Complaint   Patient presents with    Back Pain    Blurred Vision         HPI:    Samina Marin is a 74 y.o. female with a past medical history significant for anxiety depression, CVA, GERD, hypertension, and chronic back pain who presented to the emergency department with complaint of low back pain and was admitted to the ED observation unit for further evaluation and workup.    Patient states she was with family at [facility] where she was sitting on a bench when she began having worsening back pain.  States her family got her into the truck and she started \"seeing black things flying around\" and her family thought she may be having a stroke and brought her to the emergency department for evaluation.  Patient currently denies any floaters and is observed watching television.  Patient does state that she has been having chronic back pain for approximately 3 years and the only way she gets any relief is when she lays on her couch and does not move.  Patient states on some days the pain begins at her bra line and goes down and on other days it is just her low back.  Patient also reports that she is unable to raise her right foot to go up a step and this has been ongoing for approximately 3 years.   Patient reports she is scheduled for outpatient MRI in July, but states \"I just cannot live like this anymore and we need to figure out what is wrong.\"     ED workup: CBC unremarkable except Hgb 11.4. CMP unremarkable except Glucose 150 and Alk Phos 123.  CT scan of the head reported no acute intracranial findings.  Tylenol and IV Toradol given for pain relief.    On admission to the ED observation unit, patient is alert and oriented x 4.  Currently complains of back pain but states pain " has improved since receiving medication in the emergency department.    Review of Systems   All systems were reviewed and negative except for: As documented in HPI.    Personal History     Past Medical History:   Diagnosis Date    Allergic rhinitis     Anxiety and depression     Arthritis     Chronic bronchitis     Concussion 2023    CVA (cerebral vascular accident)     Eczema     face & scalp    GERD (gastroesophageal reflux disease)     Hypertension     Perianal abscess     Sinus bradycardia     Stroke 2023    dx by [facility]  ? date    Urinary incontinence     wears pads       Past Surgical History:   Procedure Laterality Date    BACK SURGERY      lumbar    CARDIAC CATHETERIZATION N/A 12/09/2021    Procedure: Left Heart Cath;  Surgeon: Marily Maya MD;  Location:  [facility];  Service: Cardiology;  Laterality: N/A;    CARDIAC CATHETERIZATION N/A 12/09/2021    Procedure: Coronary angiography;  Surgeon: Marily Maya MD;  Location:  [facility];  Service: Cardiology;  Laterality: N/A;    CARDIAC CATHETERIZATION N/A 12/09/2021    Procedure: Left ventriculography;  Surgeon: Marily Maya MD;  Location:  [facility];  Service: Cardiology;  Laterality: N/A;    CARDIAC ELECTROPHYSIOLOGY STUDY AND ABLATION      CATARACT EXTRACTION EXTRACAPSULAR W/ INTRAOCULAR LENS IMPLANTATION Bilateral     COLONOSCOPY      ENDOSCOPY      EPIDURAL Right 11/07/2022    Procedure: LUMBAR/SACRAL TRANSFORAMINAL EPIDURAL RIGHT L4, L5;  Surgeon: Jonelle Abraham MD;  [place] [facility];  Service: Pain Management;  Laterality: Right;    HYSTERECTOMY      MEDIAL BRANCH BLOCK Bilateral 01/04/2023    Procedure: LUMBAR MEDIAL BRANCH BLOCK BILATERAL L3-L5 #1;  Surgeon: Jonelle Abraham MD;  Location: [facility];  Service: Pain Management;  Laterality: Bilateral;    ROTATOR CUFF REPAIR Right     SHOULDER ARTHROSCOPY W/ ROTATOR CUFF REPAIR Left 12/28/2023    Procedure: LEFT SHOULDER  ARTHROSCOPY WITH ROTATOR CUFF REPAIR BICEP TENODESIS EXTENSIVE DEBRIDEMENT ACROMIOPLASTY DISTIAL CLAVICAL EXCISION;  Surgeon: Yosvany Stock MD;  Location: [facility];  Service: Orthopedics;  Laterality: Left;    TENDON REPAIR Right     SHOULDER       Family History: family history includes Heart disease in her brother and sister; Hypertension in her sister; Liver cancer in her sister; Ovarian cancer in her sister; Pancreatic cancer in her sister. Otherwise pertinent FHx was reviewed and not pertinent to current issue.    Social History:  reports that she has never smoked. She has never been exposed to tobacco smoke. She has never used smokeless tobacco. She reports current alcohol use. She reports that she does not use drugs.    Home Medications:  DULoxetine, (Ibuprofen 3 %, Gabapentin 10 %, Baclofen 2 %, lidocaine 4 %, Ketamine HCl 4 %), amLODIPine, aspirin, atorvastatin, cetirizine, esomeprazole, ibuprofen, lidocaine, metoprolol tartrate, pregabalin, and tolterodine    Allergies:  Allergies   Allergen Reactions    Meperidine Rash       Objective   Objective     Vitals:   Temp:  [98.4 °F (36.9 °C)-98.8 °F (37.1 °C)] 98.4 °F (36.9 °C)  Heart Rate:  [68-73] 68  Resp:  [16] 16  BP: ()/(44-56) 107/53  Physical Exam    Constitutional: Awake, alert   Eyes: PERRLA, sclerae anicteric, no conjunctival injection   HENT: NCAT, mucous membranes moist   Neck: Supple, no thyromegaly, no lymphadenopathy, trachea midline   Respiratory: Clear to auscultation bilaterally, nonlabored respirations    Cardiovascular: RRR, no murmurs, rubs, or gallops,   Gastrointestinal: Nondistended   Musculoskeletal: No bilateral ankle edema, no clubbing or cyanosis to extremities   Psychiatric: Appropriate affect, cooperative  Neurologic: Oriented x 3, strength symmetric in all extremities, Cranial Nerves grossly intact to confrontation, speech clear   Skin: No rashes     Result Review    Result Review:  I have personally reviewed the results  from the time of this admission to 6/21/2025 22:48 EDT and agree with these findings:  [x]  Laboratory list / accordion  []  Microbiology  [x]  Radiology  []  EKG/Telemetry   []  Cardiology/Vascular   []  Pathology  [x]  Old records  []  Other:  Most notable findings include: None noted      Assessment & Plan   The ASCVD Risk score (Maxine WILLIAM, et al., 2019) failed to calculate for the following reasons:    Risk score cannot be calculated because patient has a medical history suggesting prior/existing ASCVD    Assessment / Plan     Brief Patient Summary:  Samina Marin is a 74 y.o. female who was admitted to the ED observation unit for further evaluation and workup with a complaint of worsening back pain.    Active Hospital Problems:  Active Hospital Problems    Diagnosis     **Low back pain      Plan:     Acute on chronic low back pain  Labs unremarkable  Multimodal analgesic medication for pain relief  Continue lyrica  MRI of thoracic and lumbar spine is pending  ARLENE consult in am    Hypertension  Hyperlipidemia  Continue amlodipine, metoprolol tartrate, atorvastatin, aspirin      VTE Prophylaxis:  Mechanical VTE prophylaxis orders are present.      CODE STATUS:    Code Status (Patient has no pulse and is not breathing): CPR (Attempt to Resuscitate)  Medical Interventions (Patient has pulse or is breathing): Full Support  Level Of Support Discussed With: Patient    Admission Status:  I believe this patient meets observation status.    Electronically signed by HORTENSIA Pan, 06/21/25, 10:48 PM EDT.      75 minutes has been spent by [facility] providers in the care of this patient while under observation status on this date 06/21/25      I have worn appropriate PPE during this patient encounter, sanitized my hands both with entering and exiting patient's room.    I have discussed plan of care with patient including advance care plan and/or surrogate decision maker.  Patient  advises that their son/daughter will be their primary surrogate decision maker

## 2025-06-22 NOTE — PROGRESS NOTES
FAISAL BRITO Attestation Note    I supervised care provided by the midlevel provider.    The MARLA and I have discussed this patient's history, physical exam, and treatment plan. I have reviewed the documentation and personally had a face to face interaction with the patient  I affirm the documentation and agree with the treatment and plan. I provided a substantive portion of the care of this patient.  I personally performed the physical exam, in its entirety.  My personal findings are documented in below:    History:  Patient with history of hypertension and GERD and chronic back pain is admitted to observation unit for further workup of worsening mid back pain and lower back pain.  Patient continues to complain of pain especially with movements for position changes.  This morning she says her vision is completely normal.  Denies any floaters or disturbances in her field of vision.  Denies headache.  Denies numbness or weakness of the extremities.    Physical Exam:  General: No acute distress.  HENT: NCAT, moist mucous membranes  Eyes: no scleral icterus.,  Pupils equally round.  EOMI.  Neck: Painless range of motion  CV: Pink warm and well-perfused throughout  Respiratory: No distress or increased work of breathing  Abdomen: soft, no focal tenderness or rigidity  Musculoskeletal: no deformity.  Neuro: Alert, speech fluent and easily intelligible  Skin: warm, dry.    Assessment and Plan:  Acute on chronic low back pain: MRI thoracic and lumbar spine is completed, demonstrating multiple levels of moderate annular disc bulge and facet spurring in the lumbar spine.  We will continue with multimodal analgesia.  Neurosurgery is consulted.  Further recommendations to follow.    Hypertension/hyperlipidemia: Continue home medications and monitor vital signs.

## 2025-06-22 NOTE — PLAN OF CARE
Goal Outcome Evaluation:            Patient and patients family reported that they mainly came to the hospital due to what seems to be a floater in the patient's vision. Patient has chronic back pain. No complaints during the shift, no pain medications given. Lumbar and Thoracic spine MRI results pending. Neurosurgery consult in the morning.

## 2025-06-22 NOTE — ED NOTES
.Nursing report ED to floor  Samina Marin  74 y.o.  female    HPI :  HPI  Stated Reason for Visit: back pain seeing jacob    Chief Complaint  Chief Complaint   Patient presents with    Back Pain    Blurred Vision       Admitting doctor:   Vipin Bess MD    Admitting diagnosis:   The primary encounter diagnosis was Lumbar radiculopathy. A diagnosis of Vitreous floaters, unspecified laterality was also pertinent to this visit.    Code status:   Current Code Status       Date Active Code Status Order ID Comments User Context       6/21/2025 2132 CPR (Attempt to Resuscitate) [id number]  Jovana Devine, [facility]        Question Answer    Code Status (Patient has no pulse and is not breathing) CPR (Attempt to Resuscitate)    Medical Interventions (Patient has pulse or is breathing) Full Support    Level Of Support Discussed With Patient                    Allergies:   Meperidine    Isolation:   No active isolations    Intake and Output  No intake or output data in the 24 hours ending 06/21/25 2146    Weight:   There were no vitals filed for this visit.    Most recent vitals:   Vitals:    06/21/25 1813 06/21/25 1816 06/21/25 1837 06/21/25 2000   BP:  99/44  103/56   BP Location:    Right arm   Patient Position:    Lying   Pulse: 72   73   Resp: 16   16   Temp:   98.8 °F (37.1 °C)    TempSrc:   Oral    SpO2: 91%   95%       Active LDAs/IV Access:   Lines, Drains & Airways       Active LDAs       Name Placement date Placement time Site Days    Peripheral IV 06/21/25 1849 20 G Right Antecubital 06/21/25 1849  Antecubital  less than 1                    Labs (abnormal labs have a star):   Labs Reviewed   COMPREHENSIVE METABOLIC PANEL - Abnormal; Notable for the following components:       Result Value    Glucose 150 (*)     Alkaline Phosphatase 123 (*)     All other components within normal limits    Narrative:     GFR Categories in Chronic Kidney Disease (CKD)              GFR Category          GFR (mL/min/1.73)     Interpretation  G1                    90 or greater        Normal or high (1)  G2                    60-89                Mild decrease (1)  G3a                   45-59                Mild to moderate decrease  G3b                   30-44                Moderate to severe decrease  G4                    15-29                Severe decrease  G5                    14 or less           Kidney failure    (1)In the absence of evidence of kidney disease, neither GFR category G1 or G2 fulfill the criteria for CKD.    eGFR calculation 2021 CKD-EPI creatinine equation, which does not include race as a factor   CBC WITH AUTO DIFFERENTIAL - Abnormal; Notable for the following components:    Hemoglobin 11.4 (*)     MCH 26.5 (*)     MCHC 30.7 (*)     All other components within normal limits   PROTIME-INR - Normal   MAGNESIUM - Normal   CBC AND DIFFERENTIAL    Narrative:     The following orders were created for panel order CBC & Differential.  Procedure                               Abnormality         Status                     ---------                               -----------         ------                     CBC Auto Differential[[id number]]        Abnormal            Final result                 Please view results for these tests on the individual orders.       EKG:   No orders to display       Meds given in ED:   Medications   sodium chloride 0.9 % flush 10 mL (has no administration in time range)   Lidocaine 4 % 1 patch (1 patch Transdermal Medication Applied 6/21/25 1918)   sodium chloride 0.9 % flush 10 mL (has no administration in time range)   sodium chloride 0.9 % flush 10 mL (has no administration in time range)   sodium chloride 0.9 % infusion 40 mL (has no administration in time range)   ondansetron ODT (ZOFRAN-ODT) disintegrating tablet 4 mg (has no administration in time range)     Or   ondansetron (ZOFRAN) injection 4 mg (has no administration in time range)   ketorolac (TORADOL) injection 15 mg (15 mg  Intravenous Given 6/21/25 1918)   acetaminophen (TYLENOL) tablet 1,000 mg (1,000 mg Oral Given 6/21/25 1918)       Imaging results:  CT Head Without Contrast  Result Date: 6/21/2025  No acute intracranial findings.  Radiation dose reduction techniques were utilized, including automated exposure control and exposure modulation based on body size.   This report was finalized on 6/21/2025 8:26 PM by Dr. Tayler Brooks M.D on Workstation: [id number]        Social issues:   Social History     Socioeconomic History    Marital status:    Tobacco Use    Smoking status: Never     Passive exposure: Never    Smokeless tobacco: Never    Tobacco comments:     CAFFEINE USE   Vaping Use    Vaping status: Never Used   Substance and Sexual Activity    Alcohol use: Yes     Comment: 5 drinks yearly    Drug use: Never    Sexual activity: Defer       Peripheral Neurovascular  Peripheral Neurovascular (Adult)  Peripheral Neurovascular WDL: WDL    Neuro Cognitive  Neuro Cognitive (Adult)  Cognitive/Neuro/Behavioral WDL: .WDL except (pt was out in the heat at a family function and had a black spot that was in her field of vision, no blurred vision or loss of field.)  Additional Documentation: NIH Stroke Scale (group)  NIH Stroke Scale  Interval: baseline  1a. Level of Consciousness: 0-->Alert, keenly responsive  1b. LOC Questions: 0-->Answers both questions correctly  1c. LOC Commands: 0-->Performs both tasks correctly  2. Best Gaze: 0-->Normal  3. Visual: 0-->No visual loss  4. Facial Palsy: 0-->Normal symmetrical movements  5a. Motor Arm, Left: 0-->No drift, limb holds 90 (or 45) degrees for full 10 secs  5b. Motor Arm, Right: 0-->No drift, limb holds 90 (or 45) degrees for full 10 secs  6a. Motor Leg, Left: 0-->No drift, leg holds 30 degree position for full 5 secs  6b. Motor Leg, Right: 0-->No drift, leg holds 30 degree position for full 5 secs  7. Limb Ataxia: 0-->Absent  8. Sensory: 0-->Normal, no sensory loss  9. Best  Language: 0-->No aphasia, normal  10. Dysarthria: 0-->Normal  11. Extinction and Inattention (formerly Neglect): 0-->No abnormality  Total (NIH Stroke Scale): 0    Learning  Learning Assessment  Learning Readiness and Ability: no barriers identified    Respiratory  Respiratory WDL  Respiratory WDL: WDL    Abdominal Pain       Pain Assessments  Pain (Adult)  (0-10) Pain Rating: Rest: 8  (0-10) Pain Rating: Activity: 8  Pain Location: back  Pain Side/Orientation: lower  Pain Management Interventions: pain medication given  Response to Pain Interventions: interventions effective per patient    NIH Stroke Scale  NIH Stroke Scale  Interval: baseline  1a. Level of Consciousness: 0-->Alert, keenly responsive  1b. LOC Questions: 0-->Answers both questions correctly  1c. LOC Commands: 0-->Performs both tasks correctly  2. Best Gaze: 0-->Normal  3. Visual: 0-->No visual loss  4. Facial Palsy: 0-->Normal symmetrical movements  5a. Motor Arm, Left: 0-->No drift, limb holds 90 (or 45) degrees for full 10 secs  5b. Motor Arm, Right: 0-->No drift, limb holds 90 (or 45) degrees for full 10 secs  6a. Motor Leg, Left: 0-->No drift, leg holds 30 degree position for full 5 secs  6b. Motor Leg, Right: 0-->No drift, leg holds 30 degree position for full 5 secs  7. Limb Ataxia: 0-->Absent  8. Sensory: 0-->Normal, no sensory loss  9. Best Language: 0-->No aphasia, normal  10. Dysarthria: 0-->Normal  11. Extinction and Inattention (formerly Neglect): 0-->No abnormality  Total (NIH Stroke Scale): 0    Neva Guerrero RN  06/21/25 21:46 EDT

## 2025-06-22 NOTE — SIGNIFICANT NOTE
06/22/25 0906   OTHER   Discipline physical therapist   Rehab Time/Intention   Session Not Performed other (see comments);patient/family declined evaluation  (Pt reports no mobility issues. She denies acute PT eval. Would like to f/u with OP PT.)   Therapy Assessment/Plan (PT)   Criteria for Skilled Interventions Met (PT) patient/family refuse skilled intervention at this time

## 2025-06-22 NOTE — PROGRESS NOTES
"ED OBSERVATION PROGRESS/DISCHARGE SUMMARY    Date of Admission: 6/21/2025   LOS: 0 days   PCP: Jonelle Joseph APRN    Final Diagnosis Acute on chronic back pain       Subjective     Hospital Outcome: Samina Marin is a 74 y.o. female with a past medical history significant for anxiety depression, CVA, GERD, hypertension, and chronic back pain who presented to the emergency department with complaint of low back pain and was admitted to the ED observation unit for further evaluation and workup.     Patient states she was with family at [facility] where she was sitting on a bench when she began having worsening back pain.  States her family got her into the truck and she started \"seeing black things flying around\" and her family thought she may be having a stroke and brought her to the emergency department for evaluation.  Patient currently denies any floaters and is observed watching television.  Patient does state that she has been having chronic back pain for approximately 3 years and the only way she gets any relief is when she lays on her couch and does not move.  Patient states on some days the pain begins at her bra line and goes down and on other days it is just her low back.  Patient also reports that she is unable to raise her right foot to go up a step and this has been ongoing for approximately 3 years.   Patient reports she is scheduled for outpatient MRI in July, but states \"I just cannot live like this anymore and we need to figure out what is wrong.\"      ED workup: CBC unremarkable except Hgb 11.4. CMP unremarkable except Glucose 150 and Alk Phos 123.  CT scan of the head reported no acute intracranial findings.  Tylenol and IV Toradol given for pain relief.     On admission to the ED observation unit, patient is alert and oriented x 4.  Currently complains of back pain but states pain has improved since receiving medication in the emergency department.    6/22/2025: MRI thoracic spine is negative " for any evidence of acute compression fracture and cord appears normal in size and position and no central or foraminal encroachment throughout the thoracic spine.  MRI lumbar spine shows a benign vertebral hemangioma involving the posterior lateral aspect of the L1 vertebral body that is unchanged from 2022 L2 and L3 shows a combination of moderate annular disc bulge and severe facet hypertrophy and spurring particularly on the right that produces a combination of mild central canal stenosis and right lateral recess stenosis that appears slightly worse since 2022, L3-L4 has moderate annular disc bulge and severe facet spurring producing mild central canal stenosis and slight foraminal encroachment bilaterally that is unchanged from 2022, L4-L5 has a combination of moderate annular disc bulge and severe facet spurring producing mild central canal stenosis and bilateral foraminal narrowing with left greater than right that appears unchanged since 2022, L5-S1 no disc abnormality.  Neurosurgery saw and evaluated the patient and recommended that she have IV steroids and scheduled muscle relaxers and they will reexamine the patient in the morning.    ROS:  General: no fevers, chills  Respiratory: no cough, dyspnea  Cardiovascular: no chest pain, palpitations  Abdomen: No abdominal pain, nausea, vomiting, or diarrhea  Neurologic: No focal weakness    Objective   Physical Exam:  I have reviewed the vital signs.  Temp:  [97.8 °F (36.6 °C)-98.8 °F (37.1 °C)] 97.8 °F (36.6 °C)  Heart Rate:  [68-73] 68  Resp:  [16-17] 17  BP: ()/(44-75) 108/75  General Appearance:    Alert, cooperative, no distress  Head:    Normocephalic, atraumatic, Normal hearing  Eyes:    Sclerae anicteric, EOMI  Neck:   Supple  Lungs: Clear to auscultation bilaterally, respirations unlabored on RA  Heart: Regular rate and rhythm, S1 and S2 normal, no murmur  Abdomen:  Soft, nontender, bowel sounds active, nondistended, obese abdomen  Extremities: No  clubbing, cyanosis, or edema to lower extremities  Pulses:  2+ and symmetric in distal lower extremities  Skin: No rashes   Neurologic: Oriented x3, Normal strength to extremities    Results Review:    I have reviewed the labs, radiology results and diagnostic studies.    Results from last 7 days   Lab Units 06/21/25 1915   WBC 10*3/mm3 7.19   HEMOGLOBIN g/dL 11.4*   HEMATOCRIT % 37.1   PLATELETS 10*3/mm3 226     Results from last 7 days   Lab Units 06/21/25 1915   SODIUM mmol/L 138   POTASSIUM mmol/L 3.9   CHLORIDE mmol/L 102   CO2 mmol/L 26.0   BUN mg/dL 14.0   CREATININE mg/dL 0.95   CALCIUM mg/dL 8.9   BILIRUBIN mg/dL 0.7   ALK PHOS U/L 123*   ALT (SGPT) U/L 6   AST (SGOT) U/L 16   GLUCOSE mg/dL 150*     Imaging Results (Last 24 Hours)       Procedure Component Value Units Date/Time    MRI Thoracic Spine Without Contrast - Preliminary [[id number]] Collected: 06/22/25 0701     Updated: 06/22/25 0701    This result has not been signed. Information might be incomplete.      Narrative:      MR SCAN OF THE THORACIC SPINE WITHOUT CONTRAST AND MR SCAN OF THE LUMBAR  SPINE WITHOUT CONTRAST     HISTORY: 74-year-old female with worsening mid and low back pain that is  acute and chronic. Extends into the left leg.     The MRI scan of the thoracic and lumbar spine was performed with  sagittal and axial images without contrast. It is compared to previous  MRI scan of the lumbar spine dated 09/25/2022. The following findings  are present:  1. In the thoracic spine, there is no evidence of acute compression  fracture. The thoracic spinal cord appears normal in size and position.  There is no central or foraminal encroachment throughout the thoracic  spine.     2. Again noted is a benign vertebral hemangioma involving the  posterolateral aspect of the L1 vertebral body and extending into the  posterior elements. This is unchanged from 09/25/2022. The conus appears  normal. At L1-L2, there is minimal annular disc bulge and mild  facet  hypertrophy without central or foraminal encroachment.     3. At L2-L3, there is a combination of moderate annular disc bulge and  severe facet hypertrophy and spurring, particularly on the right. This  produces a combination of mild central canal stenosis and right lateral  recess stenosis that appears slightly worse since 09/25/2022.     4. At L3-4 there is moderate annular disc bulge and severe facet  spurring producing mild central canal stenosis and slight foraminal  encroachment bilaterally. This is unchanged from 09/25/2022.     5. At L4-L5, there is a combination of moderate annular disc bulge and  severe facet spurring producing mild central canal stenosis and  bilateral foraminal narrowing, left greater than right. This is  unchanged from 09/25/2022.     6. At L5-S1, there is no disc abnormality. There is mild facet spurring  without central or foraminal encroachment.       MRI Lumbar Spine Without Contrast - Preliminary [[id number]] Collected: 06/22/25 0701     Updated: 06/22/25 0701    This result has not been signed. Information might be incomplete.      Narrative:      MR SCAN OF THE THORACIC SPINE WITHOUT CONTRAST AND MR SCAN OF THE LUMBAR  SPINE WITHOUT CONTRAST     HISTORY: 74-year-old female with worsening mid and low back pain that is  acute and chronic. Extends into the left leg.     The MRI scan of the thoracic and lumbar spine was performed with  sagittal and axial images without contrast. It is compared to previous  MRI scan of the lumbar spine dated 09/25/2022. The following findings  are present:  1. In the thoracic spine, there is no evidence of acute compression  fracture. The thoracic spinal cord appears normal in size and position.  There is no central or foraminal encroachment throughout the thoracic  spine.     2. Again noted is a benign vertebral hemangioma involving the  posterolateral aspect of the L1 vertebral body and extending into the  posterior elements. This is unchanged  from 09/25/2022. The conus appears  normal. At L1-L2, there is minimal annular disc bulge and mild facet  hypertrophy without central or foraminal encroachment.     3. At L2-L3, there is a combination of moderate annular disc bulge and  severe facet hypertrophy and spurring, particularly on the right. This  produces a combination of mild central canal stenosis and right lateral  recess stenosis that appears slightly worse since 09/25/2022.     4. At L3-4 there is moderate annular disc bulge and severe facet  spurring producing mild central canal stenosis and slight foraminal  encroachment bilaterally. This is unchanged from 09/25/2022.     5. At L4-L5, there is a combination of moderate annular disc bulge and  severe facet spurring producing mild central canal stenosis and  bilateral foraminal narrowing, left greater than right. This is  unchanged from 09/25/2022.     6. At L5-S1, there is no disc abnormality. There is mild facet spurring  without central or foraminal encroachment.       CT Head Without Contrast [[id number]] Collected: 06/21/25 2020     Updated: 06/21/25 2029    Narrative:      CT OF THE HEAD WITHOUT CONTRAST     HISTORY: Visual disturbances     COMPARISON: None available.     TECHNIQUE: Axial CT imaging was obtained through the brain. No IV  contrast was administered.     FINDINGS:  No acute intracranial hemorrhage is seen. There is diffuse atrophy.  There is periventricular and deep white matter microangiopathic change.  There is no midline shift or mass effect. There is minimal mucosal  thickening noted within the ethmoid sinuses. Trace fluid is noted within  the left mastoid air cells. Orbits appear unremarkable.       Impression:      No acute intracranial findings.     Radiation dose reduction techniques were utilized, including automated  exposure control and exposure modulation based on body size.        This report was finalized on 6/21/2025 8:26 PM by Dr. Tayler Brooks M.D on  Workstation: [id number]               I have reviewed the medications.     Discharge Medications        ASK your doctor about these medications        Instructions Start Date   amLODIPine 10 MG tablet  Commonly known as: NORVASC   Take 1 tablet by mouth Every Morning.      aspirin 81 MG chewable tablet   81 mg, Daily      atorvastatin 40 MG tablet  Commonly known as: LIPITOR   40 mg, Oral, Every Night at Bedtime      cetirizine 10 MG tablet  Commonly known as: zyrTEC   10 mg, Oral, Daily      DULoxetine 60 MG capsule  Commonly known as: CYMBALTA   60 mg, Oral, Daily      esomeprazole 40 MG capsule  Commonly known as: nexIUM   40 mg, Every Morning Before Breakfast      Ibuprofen 3 %, Gabapentin 10 %, Baclofen 2 %, lidocaine 4 %, Ketamine HCl 4 %   1-2 g, Topical, 3 to 4 Times Daily      ibuprofen 800 MG tablet  Commonly known as: ADVIL,MOTRIN       lidocaine 5 %  Commonly known as: LIDODERM   1 patch, Transdermal, Every 24 Hours, Remove & Discard patch within 12 hours or as directed by MD      metoprolol tartrate 25 MG tablet  Commonly known as: LOPRESSOR   25 mg, Oral, 2 Times Daily      pregabalin 50 MG capsule  Commonly known as: LYRICA   50 mg, Oral, 3 Times Daily      tolterodine 2 MG tablet  Commonly known as: DETROL   2 mg, Oral, 2 Times Daily              ---------------------------------------------------------------------------------------------  Assessment & Plan   Assessment/Problem List    Low back pain      Plan:    Acute on chronic low back pain  Labs unremarkable  Multimodal analgesic medication for pain relief  Continue lyrica  MRI of thoracic and lumbar spine shows chronic disc bulging throughout  ARLENE saw and evaluated the patient and recommends IV steroids and scheduled muscle relaxers and they will reevaluate the patient in the morning.     Hypertension  Hyperlipidemia  Continue amlodipine, metoprolol tartrate, atorvastatin, aspirin    Disposition: Dependent on hospital course    Follow-up after  Discharge: Dependent on hospital course    This note will serve as a progress note    Marla Bess PA-C 06/22/25 07:26 EDT    I have worn appropriate PPE during this patient encounter, sanitized my hands both with entering and exiting patient's room.      55 minutes has been spent by [facility] providers in the care of this patient while under observation status on this date 06/22/25

## 2025-06-22 NOTE — CONSULTS
[facility] NEUROSURGERY CONSULT NOTE    Patient name: Samina Marin  Referring Provider: HORTENSIA Shelton  Reason for Consultation: Low back pain    Patient Care Team:  Jonelle Joseph APRN as PCP - General (Nurse Practitioner)  Elle Pedro APRN as Nurse Practitioner (Neurology)  Rosendo Enrique MD as Consulting Physician (Cardiology)    Chief complaint: Low back pain    Subjective .     History of present illness:    Patient is a 74 y.o.  female with medical history of prior CVA, HTN, GERD.  She reports worsening of her chronic low back pain that she has been dealing with over the past 3 years.  She reports that she has having muscle spasms over the past several weeks in her low back.  The pain will occasionally radiate down bilateral anterior thighs.  She does feel like her right foot is weak at times.  She has not fallen in approximately 6 months but was falling very frequently for period of time.  She denies loss of bowel or bladder function, saddle paresthesia, numbness/tingling in lower extremities.  She is established with Dario Nuñez with pain management, who prescribes her Lyrica and compound cream for pain.  She has had previous injections.  She reports she had lumbar surgery approximately 35 years ago.  She is not anticoagulated and has no history of cancer.      History  PAST MEDICAL HISTORY  Past Medical History:   Diagnosis Date    Allergic rhinitis     Anxiety and depression     Arthritis     Chronic bronchitis     Concussion 2023    CVA (cerebral vascular accident)     Eczema     face & scalp    GERD (gastroesophageal reflux disease)     Hypertension     Perianal abscess     Sinus bradycardia     Stroke 2023    dx by [facility]  ? date    Urinary incontinence     wears pads       PAST SURGICAL HISTORY  Past Surgical History:   Procedure Laterality Date    BACK SURGERY      lumbar    CARDIAC CATHETERIZATION N/A 12/09/2021    Procedure: Left Heart Cath;  Surgeon: Marily Maya MD;  Location:   [facility] CATH INVASIVE LOCATION;  Service: Cardiology;  Laterality: N/A;    CARDIAC CATHETERIZATION N/A 12/09/2021    Procedure: Coronary angiography;  Surgeon: Marily Maya MD;  [facility] CATH INVASIVE LOCATION;  Service: Cardiology;  Laterality: N/A;    CARDIAC CATHETERIZATION N/A 12/09/2021    Procedure: Left ventriculography;  Surgeon: Marily Maya MD;  Location: [facility] CATH INVASIVE LOCATION;  Service: Cardiology;  Laterality: N/A;    CARDIAC ELECTROPHYSIOLOGY STUDY AND ABLATION      CATARACT EXTRACTION EXTRACAPSULAR W/ INTRAOCULAR LENS IMPLANTATION Bilateral     COLONOSCOPY      ENDOSCOPY      EPIDURAL Right 11/07/2022    Procedure: LUMBAR/SACRAL TRANSFORAMINAL EPIDURAL RIGHT L4, L5;  Surgeon: Jonelle Abraham MD;  Location: [facility] MAIN OR;  Service: Pain Management;  Laterality: Right;    HYSTERECTOMY      MEDIAL BRANCH BLOCK Bilateral 01/04/2023    Procedure: LUMBAR MEDIAL BRANCH BLOCK BILATERAL L3-L5 #1;  Surgeon: Jonelle Abraham MD;  Location: [facility] MAIN OR;  Service: Pain Management;  Laterality: Bilateral;    ROTATOR CUFF REPAIR Right     SHOULDER ARTHROSCOPY W/ ROTATOR CUFF REPAIR Left 12/28/2023    Procedure: LEFT SHOULDER ARTHROSCOPY WITH ROTATOR CUFF REPAIR BICEP TENODESIS EXTENSIVE DEBRIDEMENT ACROMIOPLASTY DISTIAL CLAVICAL EXCISION;  Surgeon: Yosvany Stock MD;  Location: [facility];  Service: Orthopedics;  Laterality: Left;    TENDON REPAIR Right     SHOULDER       FAMILY HISTORY  Family History   Problem Relation Age of Onset    Heart disease Sister     Ovarian cancer Sister     Liver cancer Sister     Hypertension Sister     Pancreatic cancer Sister     Heart disease Brother     Malig Hyperthermia Neg Hx        SOCIAL HISTORY  Social History     Tobacco Use    Smoking status: Never     Passive exposure: Never    Smokeless tobacco: Never    Tobacco comments:     CAFFEINE USE   Vaping Use    Vaping status: Never Used   Substance Use Topics    Alcohol use: Yes     Comment: 5 drinks  yearly    Drug use: Never       Allergies:  Meperidine    MEDICATIONS:  Medications Prior to Admission   Medication Sig Dispense Refill Last Dose/Taking    amLODIPine (NORVASC) 10 MG tablet Take 1 tablet by mouth Every Morning.   6/21/2025 Morning    aspirin 81 MG chewable tablet Chew 1 tablet Daily.   6/21/2025 Morning    atorvastatin (LIPITOR) 40 MG tablet TAKE 1 TABLET BY MOUTH AT NIGHT 90 tablet 3 6/20/2025 Bedtime    cetirizine (zyrTEC) 10 MG tablet Take 1 tablet by mouth Daily. (Patient taking differently: Take 1 tablet by mouth Daily As Needed.) 30 tablet 0 6/21/2025 Morning    DULoxetine (CYMBALTA) 60 MG capsule TAKE 1 CAPSULE BY MOUTH DAILY 90 capsule 1 6/21/2025 Morning    esomeprazole (nexIUM) 40 MG capsule Take 1 capsule by mouth Every Morning Before Breakfast.   6/21/2025 Morning    ibuprofen (ADVIL,MOTRIN) 800 MG tablet    Past Week    Ibuprofen 3 %, Gabapentin 10 %, Baclofen 2 %, lidocaine 4 %, Ketamine HCl 4 % Apply 1-2 g topically to the appropriate area as directed 3 (Three) to 4 (Four) times daily. 90 g 0 Past Month    lidocaine (LIDODERM) 5 % Place 1 patch on the skin as directed by provider Daily. Remove & Discard patch within 12 hours or as directed by MD 14 each 1 Past Week    metoprolol tartrate (LOPRESSOR) 25 MG tablet Take 1 tablet by mouth 2 (Two) Times a Day. 180 tablet 1 6/21/2025 Morning    tolterodine (DETROL) 2 MG tablet Take 1 tablet by mouth 2 (Two) Times a Day. 180 tablet 1 6/21/2025 Morning    pregabalin (LYRICA) 50 MG capsule Take 1 capsule by mouth 3 (Three) Times a Day. 90 capsule 0 6/19/2025     Current Facility-Administered Medications:     amLODIPine (NORVASC) tablet 10 mg, 10 mg, Oral, QAM, Jovana Devine, APRJAMAR    aspirin chewable tablet 81 mg, 81 mg, Oral, Daily, Jovana Devine APRN, 81 mg at 06/22/25 0853    atorvastatin (LIPITOR) tablet 40 mg, 40 mg, Oral, Nightly, Jovana Devine APRN, 40 mg at 06/21/25 8152    DULoxetine (CYMBALTA) DR capsule 60 mg, 60 mg, Oral,  Daily, Jovana Devine APRN, 60 mg at 06/22/25 0853    HYDROcodone-acetaminophen (NORCO) 5-325 MG per tablet 1 tablet, 1 tablet, Oral, Q6H PRN, Thomas Wall MD, 1 tablet at 06/22/25 0853    Hydrocortisone Sod Suc (PF) (Solu-CORTEF) injection 250 mg, 250 mg, Intravenous, Q6H, Thomas Wall MD, 250 mg at 06/22/25 0948    Lidocaine 4 % 2 patch, 2 patch, Transdermal, Q24H, Jovana Devine APRN    methocarbamol (ROBAXIN) tablet 750 mg, 750 mg, Oral, Q8H, Thomas Wall MD    metoprolol tartrate (LOPRESSOR) tablet 25 mg, 25 mg, Oral, BID, Jovana Devine APRN, 25 mg at 06/22/25 0852    ondansetron ODT (ZOFRAN-ODT) disintegrating tablet 4 mg, 4 mg, Oral, Q6H PRN **OR** ondansetron (ZOFRAN) injection 4 mg, 4 mg, Intravenous, Q6H PRN, Jovana Devine APRN    oxybutynin XL (DITROPAN-XL) 24 hr tablet 10 mg, 10 mg, Oral, Daily, Jovana Devine APRN, 10 mg at 06/22/25 0858    pantoprazole (PROTONIX) EC tablet 40 mg, 40 mg, Oral, Q AM, Jovana Devine APRN, 40 mg at 06/22/25 0852    pregabalin (LYRICA) capsule 50 mg, 50 mg, Oral, TID, Jovana Devine APRN, 50 mg at 06/22/25 0853    [COMPLETED] Insert Peripheral IV, , , Once **AND** sodium chloride 0.9 % flush 10 mL, 10 mL, Intravenous, PRN, Luis Enrique Valencia MD    sodium chloride 0.9 % flush 10 mL, 10 mL, Intravenous, Q12H, Jovana Devine APRN, 10 mL at 06/22/25 0855    sodium chloride 0.9 % flush 10 mL, 10 mL, Intravenous, PRN, Devine, Jovana M, APRN    sodium chloride 0.9 % infusion 40 mL, 40 mL, Intravenous, PRN, Jovana Devine, APRN      COMORBID CONDITIONS:  Hypertension      Review of Systems  Review of Systems   Constitutional:  Negative for chills and fever.   Gastrointestinal:         Denies bowel incontinence   Genitourinary:  Negative for difficulty urinating. Hematuria: Right foot.       Denies urinary incontinence   Musculoskeletal:  Positive for back pain. Negative for gait problem.   Neurological:  Positive for weakness. Negative for numbness.         Denies saddle paresthesia   Psychiatric/Behavioral:  The patient is not nervous/anxious.      Objective     Physical Exam  Physical Exam  Vitals reviewed.   Constitutional:       Appearance: Normal appearance.   Pulmonary:      Effort: Pulmonary effort is normal.   Musculoskeletal:      Cervical back: Normal.      Thoracic back: Normal.      Lumbar back: Bony tenderness present. No tenderness. Negative right straight leg raise test and negative left straight leg raise test.   Skin:     General: Skin is warm and dry.   Neurological:      Mental Status: She is alert.      Motor: Motor strength is normal.     Deep Tendon Reflexes:      Reflex Scores:       Tricep reflexes are 2+ on the right side and 2+ on the left side.       Bicep reflexes are 2+ on the right side and 2+ on the left side.       Brachioradialis reflexes are 2+ on the right side and 2+ on the left side.       Patellar reflexes are 2+ on the right side and 2+ on the left side.       Achilles reflexes are 2+ on the right side and 2+ on the left side.  Psychiatric:         Mood and Affect: Mood normal.       Neurological Exam  Mental Status  Alert.    Motor  Normal muscle bulk throughout. Normal muscle tone. Strength is 5/5 throughout all four extremities.    Sensory  Sensation is intact to light touch, pinprick, vibration and proprioception in all four extremities.    Reflexes                                            Right                      Left  Brachioradialis                    2+                         2+  Biceps                                 2+                         2+  Triceps                                2+                         2+  Patellar                                2+                         2+  Achilles                                2+                         2+    Right pathological reflexes: Cayla's absent. Ankle clonus absent.  Left pathological reflexes: Cayla's absent. Ankle clonus absent.    Gait    Gait not  tested.        Results Review:    LABS:    Admission on 06/21/2025   Component Date Value Ref Range Status    Glucose 06/21/2025 150 (H)  65 - 99 mg/dL Final    BUN 06/21/2025 14.0  8.0 - 23.0 mg/dL Final    Creatinine 06/21/2025 0.95  0.57 - 1.00 mg/dL Final    Sodium 06/21/2025 138  136 - 145 mmol/L Final    Potassium 06/21/2025 3.9  3.5 - 5.2 mmol/L Final    Chloride 06/21/2025 102  98 - 107 mmol/L Final    CO2 06/21/2025 26.0  22.0 - 29.0 mmol/L Final    Calcium 06/21/2025 8.9  8.6 - 10.5 mg/dL Final    Total Protein 06/21/2025 6.5  6.0 - 8.5 g/dL Final    Albumin 06/21/2025 3.7  3.5 - 5.2 g/dL Final    ALT (SGPT) 06/21/2025 6  1 - 33 U/L Final    AST (SGOT) 06/21/2025 16  1 - 32 U/L Final    Alkaline Phosphatase 06/21/2025 123 (H)  39 - 117 U/L Final    Total Bilirubin 06/21/2025 0.7  0.0 - 1.2 mg/dL Final    Globulin 06/21/2025 2.8  gm/dL Final    A/G Ratio 06/21/2025 1.3  g/dL Final    BUN/Creatinine Ratio 06/21/2025 14.7  7.0 - 25.0 Final    Anion Gap 06/21/2025 10.0  5.0 - 15.0 mmol/L Final    eGFR 06/21/2025 63.0  >60.0 mL/min/1.73 Final    Protime 06/21/2025 13.9  11.7 - 14.2 Seconds Final    INR 06/21/2025 1.07  0.90 - 1.10 Final    Magnesium 06/21/2025 2.0  1.6 - 2.4 mg/dL Final    WBC 06/21/2025 7.19  3.40 - 10.80 10*3/mm3 Final    RBC 06/21/2025 4.31  3.77 - 5.28 10*6/mm3 Final    Hemoglobin 06/21/2025 11.4 (L)  12.0 - 15.9 g/dL Final    Hematocrit 06/21/2025 37.1  34.0 - 46.6 % Final    MCV 06/21/2025 86.1  79.0 - 97.0 fL Final    MCH 06/21/2025 26.5 (L)  26.6 - 33.0 pg Final    MCHC 06/21/2025 30.7 (L)  31.5 - 35.7 g/dL Final    RDW 06/21/2025 13.4  12.3 - 15.4 % Final    RDW-SD 06/21/2025 41.3  37.0 - 54.0 fl Final    MPV 06/21/2025 9.8  6.0 - 12.0 fL Final    Platelets 06/21/2025 226  140 - 450 10*3/mm3 Final    Neutrophil % 06/21/2025 64.9  42.7 - 76.0 % Final    Lymphocyte % 06/21/2025 26.4  19.6 - 45.3 % Final    Monocyte % 06/21/2025 6.3  5.0 - 12.0 % Final    Eosinophil % 06/21/2025 1.9  0.3  - 6.2 % Final    Basophil % 06/21/2025 0.4  0.0 - 1.5 % Final    Immature Grans % 06/21/2025 0.1  0.0 - 0.5 % Final    Neutrophils, Absolute 06/21/2025 4.66  1.70 - 7.00 10*3/mm3 Final    Lymphocytes, Absolute 06/21/2025 1.90  0.70 - 3.10 10*3/mm3 Final    Monocytes, Absolute 06/21/2025 0.45  0.10 - 0.90 10*3/mm3 Final    Eosinophils, Absolute 06/21/2025 0.14  0.00 - 0.40 10*3/mm3 Final    Basophils, Absolute 06/21/2025 0.03  0.00 - 0.20 10*3/mm3 Final    Immature Grans, Absolute 06/21/2025 0.01  0.00 - 0.05 10*3/mm3 Final    nRBC 06/21/2025 0.0  0.0 - 0.2 /100 WBC Final       DIAGNOSTICS:  MRI Thoracic & Lumbar spine:    No fracture or spinal cord abnormality in the thoracic spine    Benign vertebral hemangioma involving posterolateral aspect of L1 vertebral body is unchanged when compared to imaging 9/25/2022.  L2-L5:  Mild central canal stenosis unchanged from 9/25/2022      Results Review:   I reviewed the patient's new clinical results.  I personally viewed  the patient's chart/imaging, it was also discussed with and reviewed by Dr. Johnson    Vital Signs   Temp:  [97.8 °F (36.6 °C)-98.8 °F (37.1 °C)] 97.8 °F (36.6 °C)  Heart Rate:  [68-73] 68  Resp:  [16-17] 17  BP: ()/(44-75) 108/75      Assessment & Plan       Low back pain      Problem List Items Addressed This Visit       Lumbar radiculopathy - Primary     Other Visit Diagnoses         Vitreous floaters, unspecified laterality                 74-year-old female who presents with worsening chronic low back pain over the past several weeks.  She describes it as muscle spasms in her low back.  She has had no recent injury or trauma.  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  Has had previous lumbar surgery approximately 35 years ago.  MRI of the thoracic and lumbar spine showed no acute findings, no change when compared to prior imaging in 2022. No neurosurgical intervention warranted.  Recommend IV Solu-Cortef, PT and pain medication of observation  "provider preference.  We will check back on patient tomorrow to see how she has done after being on IV steroids for 24 hours.    PLAN:   -IV Solu Cortef 250 mg q 6 hrs  -PT  -Pain control of admitting service choice    I discussed the patient's findings and my recommendations with patient, family, primary care team, and Dr Johnson    During patient visit, I utilized appropriate personal protective equipment including gloves and mask.  Mask used was standard procedure mask. Appropriate PPE was worn during the entire visit.  Hand hygiene was completed before and after.     I spent 42 minutes caring for Samina Marin on this date of service. This time includes time spent by me in the following activities: preparing for the visit, reviewing tests, obtaining and/or reviewing a separately obtained history, performing a medically appropriate examination and/or evaluation, counseling and educating the patient/family/caregiver, ordering medications, tests, or procedures, referring and communicating with other health care professionals, documenting information in the medical record, independently interpreting results and communicating that information with the patient/family/caregiver, and care coordination         Anila Merlos, APRN  06/22/25  08:19 EDT    \"Dictated utilizing Dragon dictation\".      "

## 2025-06-23 ENCOUNTER — READMISSION MANAGEMENT (OUTPATIENT)
Dept: CALL CENTER | Facility: HOSPITAL | Age: 75
End: 2025-06-23
Payer: MEDICARE

## 2025-06-23 VITALS
OXYGEN SATURATION: 94 % | BODY MASS INDEX: 34.49 KG/M2 | HEIGHT: 65 IN | TEMPERATURE: 97.6 F | DIASTOLIC BLOOD PRESSURE: 53 MMHG | RESPIRATION RATE: 18 BRPM | HEART RATE: 74 BPM | SYSTOLIC BLOOD PRESSURE: 96 MMHG | WEIGHT: 207 LBS

## 2025-06-23 LAB
ANION GAP SERPL CALCULATED.3IONS-SCNC: 11 MMOL/L (ref 5–15)
BUN SERPL-MCNC: 16 MG/DL (ref 8–23)
BUN/CREAT SERPL: 22.5 (ref 7–25)
CALCIUM SPEC-SCNC: 8.6 MG/DL (ref 8.6–10.5)
CHLORIDE SERPL-SCNC: 103 MMOL/L (ref 98–107)
CO2 SERPL-SCNC: 22 MMOL/L (ref 22–29)
CREAT SERPL-MCNC: 0.71 MG/DL (ref 0.57–1)
DEPRECATED RDW RBC AUTO: 40.1 FL (ref 37–54)
EGFRCR SERPLBLD CKD-EPI 2021: 89.4 ML/MIN/1.73
ERYTHROCYTE [DISTWIDTH] IN BLOOD BY AUTOMATED COUNT: 13 % (ref 12.3–15.4)
GLUCOSE SERPL-MCNC: 170 MG/DL (ref 65–99)
HCT VFR BLD AUTO: 33.7 % (ref 34–46.6)
HGB BLD-MCNC: 10.8 G/DL (ref 12–15.9)
MCH RBC QN AUTO: 27.5 PG (ref 26.6–33)
MCHC RBC AUTO-ENTMCNC: 32 G/DL (ref 31.5–35.7)
MCV RBC AUTO: 85.8 FL (ref 79–97)
PLATELET # BLD AUTO: 170 10*3/MM3 (ref 140–450)
PMV BLD AUTO: 10.1 FL (ref 6–12)
POTASSIUM SERPL-SCNC: 4 MMOL/L (ref 3.5–5.2)
RBC # BLD AUTO: 3.93 10*6/MM3 (ref 3.77–5.28)
SODIUM SERPL-SCNC: 136 MMOL/L (ref 136–145)
WBC NRBC COR # BLD AUTO: 7.06 10*3/MM3 (ref 3.4–10.8)

## 2025-06-23 PROCEDURE — 96376 TX/PRO/DX INJ SAME DRUG ADON: CPT

## 2025-06-23 PROCEDURE — 80048 BASIC METABOLIC PNL TOTAL CA: CPT | Performed by: STUDENT IN AN ORGANIZED HEALTH CARE EDUCATION/TRAINING PROGRAM

## 2025-06-23 PROCEDURE — 25010000002 HYDROCORTISONE SOD SUC (PF) 250 MG RECONSTITUTED SOLUTION: Performed by: EMERGENCY MEDICINE

## 2025-06-23 PROCEDURE — G0378 HOSPITAL OBSERVATION PER HR: HCPCS

## 2025-06-23 PROCEDURE — 99213 OFFICE O/P EST LOW 20 MIN: CPT | Performed by: NURSE PRACTITIONER

## 2025-06-23 PROCEDURE — 85027 COMPLETE CBC AUTOMATED: CPT | Performed by: STUDENT IN AN ORGANIZED HEALTH CARE EDUCATION/TRAINING PROGRAM

## 2025-06-23 RX ORDER — METHOCARBAMOL 750 MG/1
750 TABLET, FILM COATED ORAL EVERY 8 HOURS SCHEDULED
Qty: 30 TABLET | Refills: 0 | Status: SHIPPED | OUTPATIENT
Start: 2025-06-23

## 2025-06-23 RX ORDER — METHYLPREDNISOLONE 4 MG/1
TABLET ORAL
Qty: 21 TABLET | Refills: 0 | Status: SHIPPED | OUTPATIENT
Start: 2025-06-23

## 2025-06-23 RX ORDER — HYDROCODONE BITARTRATE AND ACETAMINOPHEN 5; 325 MG/1; MG/1
1 TABLET ORAL EVERY 6 HOURS PRN
Qty: 12 TABLET | Refills: 0 | Status: SHIPPED | OUTPATIENT
Start: 2025-06-23 | End: 2025-06-27

## 2025-06-23 RX ADMIN — ASPIRIN 81 MG CHEWABLE TABLET 81 MG: 81 TABLET CHEWABLE at 09:07

## 2025-06-23 RX ADMIN — OXYBUTYNIN CHLORIDE 10 MG: 10 TABLET, EXTENDED RELEASE ORAL at 09:13

## 2025-06-23 RX ADMIN — HYDROCORTISONE SODIUM SUCCINATE 250 MG: 250 INJECTION, POWDER, FOR SOLUTION INTRAMUSCULAR; INTRAVENOUS at 03:46

## 2025-06-23 RX ADMIN — METHOCARBAMOL TABLETS 750 MG: 750 TABLET, COATED ORAL at 05:51

## 2025-06-23 RX ADMIN — HYDROCODONE BITARTRATE AND ACETAMINOPHEN 1 TABLET: 5; 325 TABLET ORAL at 03:46

## 2025-06-23 RX ADMIN — HYDROCORTISONE SODIUM SUCCINATE 250 MG: 250 INJECTION, POWDER, FOR SOLUTION INTRAMUSCULAR; INTRAVENOUS at 09:06

## 2025-06-23 RX ADMIN — DULOXETINE 60 MG: 60 CAPSULE, DELAYED RELEASE ORAL at 09:07

## 2025-06-23 RX ADMIN — Medication 10 ML: at 09:08

## 2025-06-23 RX ADMIN — PREGABALIN 50 MG: 50 CAPSULE ORAL at 09:07

## 2025-06-23 RX ADMIN — PANTOPRAZOLE SODIUM 40 MG: 40 TABLET, DELAYED RELEASE ORAL at 05:51

## 2025-06-23 NOTE — CASE MANAGEMENT/SOCIAL WORK
Discharge Planning Assessment  [facility]     Patient Name: Samina Marin  MRN: [id number]  Today's Date: 6/23/2025    Admit Date: 6/21/2025        Discharge Needs Assessment    No documentation.                  Discharge Plan       Row Name 06/23/25 1012       Plan    Plan Comments Entered room, introduced self and explained role to patient and son at bedside; patient advises she will be going home w/son to recover and plans to eventually move in with him; Discussed d/c requests of rollator- advised patient there is usually a cost difference between the rollator and walker as to insurance coverage- Pt agreed to pay the difference- discussed DME provider options- patient requests to go with [facility]- Call placed to [facility] to advise and check on cost difference- she noted typically $60 is the difference- updated patient and she is agreeable to paying $60 difference- provided rollator and adjusted it accordingly. No further needs at this time;                    Expected Discharge Date and Time       Expected Discharge Date Expected Discharge Time    Jun 23, 2025            Demographic Summary    No documentation.                  Functional Status    No documentation.                  Psychosocial    No documentation.                  Abuse/Neglect    No documentation.                  Legal    No documentation.                  Substance Abuse    No documentation.                  Patient Forms    No documentation.                     Sunita Lomas RN

## 2025-06-23 NOTE — PLAN OF CARE
Goal Outcome Evaluation:      IV and heart monitor removed. Patient discharged home with family. Paperwork given, questions answered, patient educated.  All personal belongings taken home with patient.

## 2025-06-23 NOTE — PROGRESS NOTES
MD ATTESTATION NOTE      Brief HPI: Patient is feeling better this morning.  Denies weakness in her arms or legs.  She has been able to ambulate.    PHYSICAL EXAM    GENERAL: Awake, alert, and oriented x 3.  Resting comfortably in no acute distress  HENT: nares patent  EYES: no scleral icterus  CV: regular rhythm, normal rate  RESPIRATORY: normal effort, CTAB  ABDOMEN: soft, nontender  MUSCULOSKELETAL: no deformity, extremities are nontender  NEURO: moves all extremities, follows commands, speech is clear and fluent, no facial droop  PSYCH:  calm, cooperative  SKIN: warm, dry    Vital signs and nursing notes reviewed.        Plan: Patient symptoms have improved with IV steroids and multimodal analgesia.  Neurosurgery is following.  Anticipate discharge later today.

## 2025-06-23 NOTE — PLAN OF CARE
Goal Outcome Evaluation:            Patient came in for chronic back pain that has been happening for the last 3 years. She reports weakness from the pain. She has stable vital signs and is alert and oriented x 4. She has been taking solu cortef Q6. MRI shows moderate disc bulges in L 1-5. She has a neurosurgery consult and a PT consult in the morning.

## 2025-06-23 NOTE — PROGRESS NOTES
[facility] THORACIC/LUMBAR NEUROSURGERY PROGRESS NOTE      CC: Low back pain      Subjective     Interval History: Patient reports significant improvement in back pain this morning after being on steroids since yesterday.  She is ready to go home and will follow-up with her pain management.    ROS:  Constitutional: No fever, chills  MS: + low back pain  Neuro: No numbness, tingling, or weakness,  no balance difficulties  : No difficulty voiding, no incontinence    Objective     Vital signs in last 24 hours:  Temp:  [97.9 °F (36.6 °C)-98.4 °F (36.9 °C)] 97.9 °F (36.6 °C)  Heart Rate:  [63-77] 75  Resp:  [17-18] 18  BP: ()/(48-61) 107/61    Intake/Output this shift:  No intake/output data recorded.    LABS:  Results from last 7 days   Lab Units 06/23/25  0157 06/21/25  1915   WBC 10*3/mm3 7.06 7.19   HEMOGLOBIN g/dL 10.8* 11.4*   HEMATOCRIT % 33.7* 37.1   PLATELETS 10*3/mm3 170 226     Results from last 7 days   Lab Units 06/23/25  0157 06/21/25  1915   SODIUM mmol/L 136 138   POTASSIUM mmol/L 4.0 3.9   CHLORIDE mmol/L 103 102   CO2 mmol/L 22.0 26.0   BUN mg/dL 16.0 14.0   CREATININE mg/dL 0.71 0.95   CALCIUM mg/dL 8.6 8.9   BILIRUBIN mg/dL  --  0.7   ALK PHOS U/L  --  123*   ALT (SGPT) U/L  --  6   AST (SGOT) U/L  --  16   GLUCOSE mg/dL 170* 150*         IMAGING STUDIES:  No new imaging to review    I personally viewed the patient's chart, it was also reviewed by and discussed with Dr Elizabeth Mauricio reviewed/changed: Yes  Norvasc 10 mg p.o. every morning  Aspirin 81 mg p.o. daily  Lipitor 40 mg p.o. nightly  Cymbalta 60 mg p.o. daily  Solu-Cortef tenet 50 mg IV every 6 hours  Lidocaine patch every 24 hours  Robaxin-750 milligrams p.o. every 8 hours  Lopressor 25 mg p.o. twice daily  Oxybutynin 10 mg p.o. daily  Protonix 40 mg p.o. every morning  Lyrica 50 mg p.o. 3 times daily  Hydrocodone 5 mg 1 p.o. every 6 hours as needed      Physical Exam:    General:  Awake & alert  Back: No midline lumbar tenderness to  palpation  Motor:  Normal muscle strength, bulk and tone in lower extremities.    Reflexes: 2+ in bilateral lower extremities, no clonus  Sensation:  Normal to light touch bilateral LE's      Assessment & Plan     ASSESSMENT:      Low back pain    74-year-old female who presents with worsening chronic low back pain over the past several weeks.  She describes it as muscle spasms in her low back.  She has had no recent injury or trauma.  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  Has had previous lumbar surgery approximately 35 years ago.  MRI of the thoracic and lumbar spine showed no acute findings, no change when compared to prior imaging in 2022. No neurosurgical intervention warranted.  Patient reports improvement in back pain since being on steroids over the past 24 hours.  She is ready for discharge home.  Recommend that she continue to follow with her pain management.  No further recommendations from neurosurgery at this time.  We will sign off, please feel free to call with any questions or concerns.    PLAN:   -ARLENE to sign off  -DC with MDP, outpt PT  -F/U with pain management    I discussed the patient's findings and my recommendations with patient, primary care team, and Dr Johnson    I spent 26 minutes caring for Samina Marin on this date of service. This time includes time spent by me in the following activities: preparing for the visit, reviewing tests, obtaining and/or reviewing a separately obtained history, performing a medically appropriate examination and/or evaluation, counseling and educating the patient/family/caregiver, ordering medications, tests, or procedures, referring and communicating with other health care professionals, documenting information in the medical record, independently interpreting results and communicating that information with the patient/family/caregiver, and care coordination         During patient visit, I utilized appropriate personal protective equipment including gloves.  " Appropriate PPE was worn during the entire visit.  Hand hygiene was completed before and after.      LOS: 0 days       Anila Merlos, APRN  6/23/2025  07:22 EDT    \"Dictated utilizing Dragon dictation\".      "

## 2025-06-23 NOTE — DISCHARGE SUMMARY
ED OBSERVATION PROGRESS/DISCHARGE SUMMARY    Date of Admission: 6/21/2025   LOS: 0 days   PCP: Jonelle Joseph APRN      Subjective: Patient with substantial improvement of symptoms overnight.    Hospital Outcome:   Samina Marin is a 74 y.o. female with a past medical history significant for anxiety depression, CVA, GERD, hypertension, and chronic back pain who was admitted the observation unit for further evaluation of low back pain.  Patient also reported visual floaters preceding a severe episode of pain when her family was trying to get her up.  In the ER, CT head showed no acute intracranial findings.  Lab work noted hemoglobin 11.4, glucose 150 and alk phos 123 otherwise within normal limits.    6/22/2025: An MRI thoracic and lumbar spine showed no acute findings, no change when compared to prior imaging in 2022.  Neurosurgery was consulted recommending IV steroids and pain medication as well as PT follow-up.    6/23/2025: Pain reported well-controlled overnight by patient with oral medications.  IV steroids continued.  Plan evaluated by HORTENSIA with neurosurgery team today who has cleared her for discharge home outpatient follow-up with pain management.    Patient able to ambulate in exam room, care discussed with physical therapy, patient feels comfortable going home.  Request a rollator.  CCP team notified    ROS:  General: no fevers, chills  Respiratory: no cough, dyspnea  Cardiovascular: no chest pain, palpitations  Abdomen: No abdominal pain, nausea, vomiting, or diarrhea  Neurologic: No focal weakness    Objective   Physical Exam:  I have reviewed the vital signs.  Temp:  [97.8 °F (36.6 °C)-98.4 °F (36.9 °C)] 98.1 °F (36.7 °C)  Heart Rate:  [63-77] 77  Resp:  [17-18] 18  BP: ()/(48-75) 99/51  General Appearance:    Alert, cooperative, no distress  Head:    Normocephalic, atraumatic  Eyes:    Sclerae anicteric  Neck:   Supple, no mass  Lungs: Clear to auscultation bilaterally, respirations  unlabored  Heart: Regular rate and rhythm, S1 and S2 normal, no murmur, rub or gallop  Abdomen:  Soft, nontender, bowel sounds active, nondistended  Extremities: No clubbing, cyanosis, or edema to lower extremities  Pulses:  2+ and symmetric in distal lower extremities  Skin: No rashes   Neurologic: Oriented x3, Normal strength to extremities    Results Review:    I have reviewed the labs, radiology results and diagnostic studies.    Results from last 7 days   Lab Units 06/23/25  0157   WBC 10*3/mm3 7.06   HEMOGLOBIN g/dL 10.8*   HEMATOCRIT % 33.7*   PLATELETS 10*3/mm3 170     Results from last 7 days   Lab Units 06/23/25  0157 06/21/25  1915   SODIUM mmol/L 136 138   POTASSIUM mmol/L 4.0 3.9   CHLORIDE mmol/L 103 102   CO2 mmol/L 22.0 26.0   BUN mg/dL 16.0 14.0   CREATININE mg/dL 0.71 0.95   CALCIUM mg/dL 8.6 8.9   BILIRUBIN mg/dL  --  0.7   ALK PHOS U/L  --  123*   ALT (SGPT) U/L  --  6   AST (SGOT) U/L  --  16   GLUCOSE mg/dL 170* 150*     Imaging Results (Last 24 Hours)       Procedure Component Value Units Date/Time    MRI Thoracic Spine Without Contrast [[id number]] Collected: 06/22/25 0701     Updated: 06/22/25 0730    Narrative:      MR SCAN OF THE THORACIC SPINE WITHOUT CONTRAST AND MR SCAN OF THE LUMBAR  SPINE WITHOUT CONTRAST     HISTORY: 74-year-old female with worsening mid and low back pain that is  acute and chronic. Extends into the left leg.     The MRI scan of the thoracic and lumbar spine was performed with  sagittal and axial images without contrast. It is compared to previous  MRI scan of the lumbar spine dated 09/25/2022. The following findings  are present:  1. In the thoracic spine, there is no evidence of acute compression  fracture. The thoracic spinal cord appears normal in size and position.  There is no central or foraminal encroachment throughout the thoracic  spine.     2. Again noted is a benign vertebral hemangioma involving the  posterolateral aspect of the L1 vertebral body and  extending into the  posterior elements. This is unchanged from 09/25/2022. The conus appears  normal. At L1-L2, there is minimal annular disc bulge and mild facet  hypertrophy without central or foraminal encroachment.     3. At L2-L3, there is a combination of moderate annular disc bulge and  severe facet hypertrophy and spurring, particularly on the right. This  produces a combination of mild central canal stenosis and right lateral  recess stenosis that appears slightly worse since 09/25/2022.     4. At L3-4 there is moderate annular disc bulge and severe facet  spurring producing mild central canal stenosis and slight foraminal  encroachment bilaterally. This is unchanged from 09/25/2022.     5. At L4-L5, there is a combination of moderate annular disc bulge and  severe facet spurring producing mild central canal stenosis and  bilateral foraminal narrowing, left greater than right. This is  unchanged from 09/25/2022.     6. At L5-S1, there is no disc abnormality. There is mild facet spurring  without central or foraminal encroachment.     This report was finalized on 6/22/2025 7:27 AM by Dr. Prateek Garcia M.D  on Workstation: [id number]       MRI Lumbar Spine Without Contrast [[id number]] Collected: 06/22/25 0701     Updated: 06/22/25 0730    Narrative:      MR SCAN OF THE THORACIC SPINE WITHOUT CONTRAST AND MR SCAN OF THE LUMBAR  SPINE WITHOUT CONTRAST     HISTORY: 74-year-old female with worsening mid and low back pain that is  acute and chronic. Extends into the left leg.     The MRI scan of the thoracic and lumbar spine was performed with  sagittal and axial images without contrast. It is compared to previous  MRI scan of the lumbar spine dated 09/25/2022. The following findings  are present:  1. In the thoracic spine, there is no evidence of acute compression  fracture. The thoracic spinal cord appears normal in size and position.  There is no central or foraminal encroachment throughout the  thoracic  spine.     2. Again noted is a benign vertebral hemangioma involving the  posterolateral aspect of the L1 vertebral body and extending into the  posterior elements. This is unchanged from 09/25/2022. The conus appears  normal. At L1-L2, there is minimal annular disc bulge and mild facet  hypertrophy without central or foraminal encroachment.     3. At L2-L3, there is a combination of moderate annular disc bulge and  severe facet hypertrophy and spurring, particularly on the right. This  produces a combination of mild central canal stenosis and right lateral  recess stenosis that appears slightly worse since 09/25/2022.     4. At L3-4 there is moderate annular disc bulge and severe facet  spurring producing mild central canal stenosis and slight foraminal  encroachment bilaterally. This is unchanged from 09/25/2022.     5. At L4-L5, there is a combination of moderate annular disc bulge and  severe facet spurring producing mild central canal stenosis and  bilateral foraminal narrowing, left greater than right. This is  unchanged from 09/25/2022.     6. At L5-S1, there is no disc abnormality. There is mild facet spurring  without central or foraminal encroachment.     This report was finalized on 6/22/2025 7:27 AM by Dr. Prateek Garcia M.D  on Workstation: [id number]               I have reviewed the medications.     Discharge Medications        ASK your doctor about these medications        Instructions Start Date   amLODIPine 10 MG tablet  Commonly known as: NORVASC   Take 1 tablet by mouth Every Morning.      aspirin 81 MG chewable tablet   81 mg, Daily      atorvastatin 40 MG tablet  Commonly known as: LIPITOR   40 mg, Oral, Every Night at Bedtime      cetirizine 10 MG tablet  Commonly known as: zyrTEC   10 mg, Oral, Daily      DULoxetine 60 MG capsule  Commonly known as: CYMBALTA   60 mg, Oral, Daily      esomeprazole 40 MG capsule  Commonly known as: nexIUM   40 mg, Every Morning Before Breakfast       Ibuprofen 3 %, Gabapentin 10 %, Baclofen 2 %, lidocaine 4 %, Ketamine HCl 4 %   1-2 g, Topical, 3 to 4 Times Daily      ibuprofen 800 MG tablet  Commonly known as: ADVIL,MOTRIN       lidocaine 5 %  Commonly known as: LIDODERM   1 patch, Transdermal, Every 24 Hours, Remove & Discard patch within 12 hours or as directed by MD      metoprolol tartrate 25 MG tablet  Commonly known as: LOPRESSOR   25 mg, Oral, 2 Times Daily      pregabalin 50 MG capsule  Commonly known as: LYRICA   50 mg, Oral, 3 Times Daily      tolterodine 2 MG tablet  Commonly known as: DETROL   2 mg, Oral, 2 Times Daily             ---------------------------------------------------------------------------------------------  Assessment & Plan   Assessment/Problem List    Low back pain      Plan:  Acute on chronic low back pain  - MRI of the thoracic and lumbar spine shows no acute findings, chronic changes from previous imaging 2022 stable  -Multimodal pain control  -Neurosurgery consult, clear for discharge home     Hypertension  Hyperlipidemia  -Continue amlodipine and metoprolol  - Continue atorvastatin, aspirin    Disposition: Home    Follow-up after Discharge: PCP, PT, ARLENE    This note will serve as a chart summary      33 minutes have been spent by [facility] providers in the care of this patient while under observation status.    Appropriate PPE worn during patient encounter.  Hand hygeine performed before and after seeing the patient.      Milagros Curtis PA-C 06/23/25 03:59 EDT

## 2025-06-23 NOTE — OUTREACH NOTE
Prep Survey      Flowsheet Row Responses   [facility] facility patient discharged from? [place]   Is LACE score < 7 ? Yes   Eligibility [facility]   Date of Admission 06/21/25   Date of Discharge 06/23/25   Discharge Disposition Home or Self Care   Discharge diagnosis Low back pain   Does the patient have one of the following disease processes/diagnoses(primary or secondary)? Other   Does the patient have Home health ordered? No   Is there a DME ordered? Yes   What DME was ordered? [employer] for DME   Prep survey completed? Yes            CITLALI A - Registered Nurse

## 2025-06-23 NOTE — DISCHARGE PLACEMENT REQUEST
"Hemalatha Marin (74 y.o. Female)       Date of Birth   1950    Social Security Number       Address   [address] [id number]    Home Phone       MRN   [id number]       [facility]    Marital Status                               Admission Date   6/21/2025    Admission Type   Emergency    Admitting Provider   Vipin Bess MD    Attending Provider   Isreal Patel MD    Department, Room/Bed   [facility] OBSERVATION, 119/1       Discharge Date       Discharge Disposition   Home or Self Care    Discharge Destination                                 Attending Provider: Isreal Patel MD    Allergies: Meperidine    Isolation: None   Infection: None   Code Status: CPR    Ht: 165.1 cm (65\")   Wt: 93.9 kg (207 lb)    Admission Cmt: None   Principal Problem: Low back pain [M54.50]                   Active Insurance as of 6/21/2025       Primary Coverage       Payor Plan Insurance Group Employer/Plan Group    [employer] MEDICARE REPLACEMENT [employer] PPO [id number]       Payor Plan Address Payor Plan Phone Number Payor Plan Fax Number Effective Dates    [address] [address] [phone]  1/1/2020 - None Entered    [facility] [id number]         Subscriber Name Subscriber Birth Date Member ID       HEMALATHA MARIN 1950 [id number]                     Emergency Contacts        (Rel.) Home Phone Work Phone Mobile Phone    Luis Alberto Goetz (Son) [phone] -- [phone]    MARILUZ GOETZ (Daughter) -- -- [phone]                " BERTRAND  Date/Time: 7/8/2019 7:47 AM  Ordering Provider: Genesis Mcclure MD    Procedure Details: probe placement, image aquisition & interpretation    Site marked, Timeout performed, 07:48  Risks and benefits discussed with the patient and plans are to proceed    Procedure Note    Performed by: Yessy Saab MD  Authorized by: Yessy Saab MD       Indications: assessment of ascending aorta and suspected pericardial effusion  Modalities: 2D, CF, CWD, PWD  Probe Type: multiplane and epiaortic  Insertion: atraumatic  Patient Status: intubated and sedated    Echocardiographic and Doppler Measurements   Aorta  Size  Diam(cm)  Dissection PlaqueThick(mm)  Plaque Mobile    Ascending normal  No 0-3 No    Arch normal  No 0-3 No    Descending normal  No >3 No          Valves  Annulus  Stenosis  Area/Grad  Regurg  Leaflet   Morph  Leaflet   Motion    Aortic normal none  0 thickened normal    Mitral normal none  1+ thickened normal    Tricuspid normal none  1+ normal normal          Atria  Size  SEC (smoke)  Thrombus  Tumor  Device    Rt Atrium normal No No No No    Lt Atrium dilated No No No No     Interatrial Septum Morphology: normal    Interventricular Septum Morphology: normal    Ventricle  Cavity Size  Cavity Dimension Hypertrophy  Thrombus  Gloal FXN  EF    RV normal  No no normal     LV normal   No normal        Regional Function  (1 = normal, 2 = mildly hypokinetic, 3 = severely hypokinetic, 4 = akinetic, 5 = dyskinetic) LAV - Long [place]   ME LAV = 0  ME LAV = 90  ME LAV = 130   Basal Sept:1 Basal Ant:1 Basal Post:1   Mid Sept:1 Mid Ant:1 Mid Post:1   Apical Sept:1 Apical Ant:1 Basal Ant Sept:1   Basal Lat:1 Basal Inf:1 Mid Ant Sept:1   Mid Lat:1 Mid Inf:1    Apical Lat:1 Apical Inf:1        Pericardium: normal    Post Intervention Follow-up Study  Ventricular Global Function: unchanged  Ventricular Regional Function: unchanged     Valve  Function  Regurgitation  Area    Aortic no change 0     Mitral no change 1+     Tricuspid no change 1+     Prosthetic

## 2025-06-23 NOTE — SIGNIFICANT NOTE
06/23/25 0913   OTHER   Discipline physical therapist  (PT spoke w pt. Pt has  been established pt at [facility]. Pt interested in rollator at [facility] for balance -provider notified. educated on mobility recommendations and fall prevention, pt to DC today. consider resuming OP PT at [facility].)

## 2025-06-24 ENCOUNTER — TRANSITIONAL CARE MANAGEMENT TELEPHONE ENCOUNTER (OUTPATIENT)
Dept: CALL CENTER | Facility: HOSPITAL | Age: 75
End: 2025-06-24
Payer: MEDICARE

## 2025-06-24 NOTE — OUTREACH NOTE
Call Center TCM Note      Flowsheet Row Responses   [facility] patient discharged from? [place]   Does the patient have one of the following disease processes/diagnoses(primary or secondary)? Other   TCM attempt successful? No   Unsuccessful attempts Attempt 2  [Son and daughter in law on release]            Josh NEAL - Registered Nurse    6/24/2025, 12:40 EDT

## 2025-06-24 NOTE — OUTREACH NOTE
Call Center TCM Note      Flowsheet Row Responses   [facility] patient discharged from? [place]   Does the patient have one of the following disease processes/diagnoses(primary or secondary)? Other   TCM attempt successful? No   Unsuccessful attempts Attempt 1   Call Status Left message            Josh Landaverde Registered Nurse    6/24/2025, 11:49 EDT

## 2025-06-25 ENCOUNTER — TRANSITIONAL CARE MANAGEMENT TELEPHONE ENCOUNTER (OUTPATIENT)
Dept: CALL CENTER | Facility: HOSPITAL | Age: 75
End: 2025-06-25
Payer: MEDICARE

## 2025-06-25 NOTE — OUTREACH NOTE
Call Center TCM Note      Flowsheet Row Responses   [facility] patient discharged from? [place]   Does the patient have one of the following disease processes/diagnoses(primary or secondary)? Other   TCM attempt successful? No  [vr for Luis Alberto and Eloina Vazquez]   Unsuccessful attempts Attempt 3            JASPAL Landaverde Registered Nurse    6/25/2025, 11:05 EDT

## 2025-06-25 NOTE — CASE MANAGEMENT/SOCIAL WORK
Case Management Discharge Note      Final Note: Home with a rollator from [employer] Family to transport.         Selected Continued Care - Discharged on 6/23/2025 Admission date: 6/21/2025 - Discharge disposition: Home or Self Care      Destination    No services have been selected for the patient.                Durable Medical Equipment    No services have been selected for the patient.                Dialysis/Infusion    No services have been selected for the patient.                Home Medical Care    No services have been selected for the patient.                Therapy    No services have been selected for the patient.                Community Resources    No services have been selected for the patient.                Community & DME    No services have been selected for the patient.                    Transportation Services  Transportation: Private Transportation  Private: Car    Final Discharge Disposition Code: 01 - home or self-care

## 2025-07-08 DIAGNOSIS — M54.16 LUMBAR RADICULOPATHY: ICD-10-CM

## 2025-07-08 DIAGNOSIS — M51.362 DEGENERATION OF INTERVERTEBRAL DISC OF LUMBAR REGION WITH DISCOGENIC BACK PAIN AND LOWER EXTREMITY PAIN: ICD-10-CM

## 2025-07-08 RX ORDER — PREGABALIN 50 MG/1
50 CAPSULE ORAL 3 TIMES DAILY
Qty: 90 CAPSULE | Refills: 0 | Status: SHIPPED | OUTPATIENT
Start: 2025-07-08

## 2025-07-08 NOTE — TELEPHONE ENCOUNTER
Refill  Last OV: 5/21/2025  Next OV: 7/23/2025    Detail Level: Zone Render In Strict Bullet Format?: No Discontinue Regimen: Cetirizine

## 2025-07-08 NOTE — TELEPHONE ENCOUNTER
Reviewed UDS and KRISTEN. Both updated and appropriate. Refill appropriate.    Covering for Dario DECKER

## 2025-07-19 ENCOUNTER — HOSPITAL ENCOUNTER (OUTPATIENT)
Dept: MRI IMAGING | Facility: HOSPITAL | Age: 75
Discharge: HOME | End: 2025-07-19
Payer: MEDICARE

## 2025-07-19 DIAGNOSIS — M51.34 THORACIC DEGENERATIVE DISC DISEASE: ICD-10-CM

## 2025-07-19 DIAGNOSIS — M54.16 LUMBAR RADICULOPATHY: ICD-10-CM

## 2025-07-19 PROCEDURE — 25510000002 GADOBENATE DIMEGLUMINE 529 MG/ML SOLUTION: Performed by: PHYSICIAN ASSISTANT

## 2025-07-19 PROCEDURE — 76014 MR SFTY IMPLT&/FB ASMT STF 1: CPT

## 2025-07-19 PROCEDURE — 72158 MRI LUMBAR SPINE W/O & W/DYE: CPT

## 2025-07-19 PROCEDURE — A9577 INJ MULTIHANCE: HCPCS | Performed by: PHYSICIAN ASSISTANT

## 2025-07-19 RX ADMIN — GADOBENATE DIMEGLUMINE 19 ML: 529 INJECTION, SOLUTION INTRAVENOUS at 14:46

## 2025-07-23 ENCOUNTER — OFFICE VISIT (OUTPATIENT)
Dept: PAIN MEDICINE | Facility: CLINIC | Age: 75
End: 2025-07-23
Payer: MEDICARE

## 2025-07-23 VITALS
OXYGEN SATURATION: 95 % | RESPIRATION RATE: 18 BRPM | HEIGHT: 65 IN | TEMPERATURE: 98 F | DIASTOLIC BLOOD PRESSURE: 62 MMHG | WEIGHT: 211.4 LBS | BODY MASS INDEX: 35.22 KG/M2 | SYSTOLIC BLOOD PRESSURE: 90 MMHG | HEART RATE: 79 BPM

## 2025-07-23 DIAGNOSIS — M62.838 MUSCLE SPASM: ICD-10-CM

## 2025-07-23 DIAGNOSIS — M54.16 LUMBAR RADICULOPATHY: Primary | ICD-10-CM

## 2025-07-23 DIAGNOSIS — M51.34 THORACIC DEGENERATIVE DISC DISEASE: ICD-10-CM

## 2025-07-23 DIAGNOSIS — M51.362 DEGENERATION OF INTERVERTEBRAL DISC OF LUMBAR REGION WITH DISCOGENIC BACK PAIN AND LOWER EXTREMITY PAIN: ICD-10-CM

## 2025-07-23 PROCEDURE — 1160F RVW MEDS BY RX/DR IN RCRD: CPT | Performed by: PHYSICIAN ASSISTANT

## 2025-07-23 PROCEDURE — 1125F AMNT PAIN NOTED PAIN PRSNT: CPT | Performed by: PHYSICIAN ASSISTANT

## 2025-07-23 PROCEDURE — 3074F SYST BP LT 130 MM HG: CPT | Performed by: PHYSICIAN ASSISTANT

## 2025-07-23 PROCEDURE — 1159F MED LIST DOCD IN RCRD: CPT | Performed by: PHYSICIAN ASSISTANT

## 2025-07-23 PROCEDURE — 99214 OFFICE O/P EST MOD 30 MIN: CPT | Performed by: PHYSICIAN ASSISTANT

## 2025-07-23 PROCEDURE — 3078F DIAST BP <80 MM HG: CPT | Performed by: PHYSICIAN ASSISTANT

## 2025-07-23 RX ORDER — PREGABALIN 50 MG/1
CAPSULE ORAL
Qty: 270 CAPSULE | Refills: 0 | Status: SHIPPED | OUTPATIENT
Start: 2025-07-23

## 2025-07-23 RX ORDER — TRAMADOL HYDROCHLORIDE 50 MG/1
TABLET ORAL
Qty: 60 TABLET | Refills: 0 | Status: SHIPPED | OUTPATIENT
Start: 2025-07-23

## 2025-07-23 RX ORDER — TIZANIDINE HYDROCHLORIDE 2 MG/1
CAPSULE, GELATIN COATED ORAL
Qty: 60 CAPSULE | Refills: 0 | Status: SHIPPED | OUTPATIENT
Start: 2025-07-23
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
## (undated) DEVICE — GLV SURG TRIUMPH PF LTX 7.5 STRL

## (undated) DEVICE — SOL ISO/ALC 70PCT 4OZ

## (undated) DEVICE — GLV SURG TRIUMPH PF LTX 7 STRL

## (undated) DEVICE — PK CATH CARD 40

## (undated) DEVICE — GLIDESHEATH SLENDER STAINLESS STEEL KIT: Brand: GLIDESHEATH SLENDER

## (undated) DEVICE — KT POSTN ARM TRIMANO BEACH CHR W/DRP

## (undated) DEVICE — SHEET, DRAPE, SPLIT, STERILE: Brand: MEDLINE

## (undated) DEVICE — CANN TRPL DAM 7X7MM NO VLV

## (undated) DEVICE — EPIDURAL TRAY: Brand: MEDLINE INDUSTRIES, INC.

## (undated) DEVICE — BLD SHAVER TORPEDO COOLCUT 4.0MM 13CM

## (undated) DEVICE — KT MANIFLD CARDIAC

## (undated) DEVICE — BUR SHAVER FLUSHCUT 8FLUT 5MM 13CM

## (undated) DEVICE — GLV SURG SENSICARE PI LF PF 7.5 GRN STRL

## (undated) DEVICE — CATH VENT MIV RADL PIG ST TIP 5F 110CM

## (undated) DEVICE — APPL CHLORAPREP HI/LITE 26ML ORNG

## (undated) DEVICE — 1016 S-DRAPE IRRIG POUCH 10/BOX: Brand: STERI-DRAPE™

## (undated) DEVICE — NDL SPINE 22G 5IN BLK

## (undated) DEVICE — CATH DIAG IMPULSE FL3.5 5F 100CM

## (undated) DEVICE — 3M™ STERI-STRIP™ ANTIMICROBIAL SKIN CLOSURES 1/2 INCH X 4 INCHES 50/CARTON 4 CARTONS/CASE A1847: Brand: 3M™ STERI-STRIP™

## (undated) DEVICE — CANN TRPL DAM 7X7MM

## (undated) DEVICE — GLV SURG PREMIERPRO ORTHO LTX PF SZ7.5 BRN

## (undated) DEVICE — Device: Brand: PORTEX

## (undated) DEVICE — TUBING, SUCTION, 1/4" X 20', STRAIGHT: Brand: MEDLINE INDUSTRIES, INC.

## (undated) DEVICE — PK ARTHSCP SHLDR TOWER 40

## (undated) DEVICE — SUT MNCRYL 3/0 PS2 18IN Y497G

## (undated) DEVICE — GW EMR FIX EXCHG J STD .035 3MM 260CM

## (undated) DEVICE — ADHS LIQ MASTISOL 2/3ML

## (undated) DEVICE — GLV SURG SIGNATURE ESSENTIAL PF LTX SZ7.5

## (undated) DEVICE — ABL ASP APOLLO RF XL 90D

## (undated) DEVICE — CATH DIAG IMPULSE FR4 5F 100CM

## (undated) DEVICE — DRAPE,U/ SHT,SPLIT,PLAS,STERIL: Brand: MEDLINE

## (undated) DEVICE — ADAPT DB SPIKE 2PCT FOR AR6400 TBG